# Patient Record
Sex: MALE | Race: WHITE | Employment: FULL TIME | URBAN - METROPOLITAN AREA
[De-identification: names, ages, dates, MRNs, and addresses within clinical notes are randomized per-mention and may not be internally consistent; named-entity substitution may affect disease eponyms.]

---

## 2017-07-17 ENCOUNTER — OFFICE VISIT (OUTPATIENT)
Dept: FAMILY MEDICINE | Facility: CLINIC | Age: 42
End: 2017-07-17
Payer: COMMERCIAL

## 2017-07-17 VITALS
BODY MASS INDEX: 26.81 KG/M2 | SYSTOLIC BLOOD PRESSURE: 130 MMHG | WEIGHT: 187.25 LBS | HEART RATE: 74 BPM | OXYGEN SATURATION: 99 % | HEIGHT: 70 IN | TEMPERATURE: 98 F | DIASTOLIC BLOOD PRESSURE: 84 MMHG

## 2017-07-17 DIAGNOSIS — R79.89 ELEVATED LFTS: Primary | ICD-10-CM

## 2017-07-17 DIAGNOSIS — K76.0 HEPATIC STEATOSIS: ICD-10-CM

## 2017-07-17 PROCEDURE — 99214 OFFICE O/P EST MOD 30 MIN: CPT | Performed by: FAMILY MEDICINE

## 2017-07-17 PROCEDURE — 36415 COLL VENOUS BLD VENIPUNCTURE: CPT | Performed by: FAMILY MEDICINE

## 2017-07-17 PROCEDURE — 80076 HEPATIC FUNCTION PANEL: CPT | Performed by: FAMILY MEDICINE

## 2017-07-17 NOTE — NURSING NOTE
"Chief Complaint   Patient presents with     Liver       Initial /84 (Cuff Size: Adult Regular)  Pulse 74  Temp 98  F (36.7  C) (Oral)  Ht 5' 9.5\" (1.765 m)  Wt 187 lb 4 oz (84.9 kg)  SpO2 99%  BMI 27.26 kg/m2 Estimated body mass index is 27.26 kg/(m^2) as calculated from the following:    Height as of this encounter: 5' 9.5\" (1.765 m).    Weight as of this encounter: 187 lb 4 oz (84.9 kg).  Medication Reconciliation: complete     Danyelle Crisostomo, CMA      "

## 2017-07-17 NOTE — PROGRESS NOTES
SUBJECTIVE:                                                    Jake Brothers is a 42 year old male who presents to clinic today for the following health issues:      Pt would like to discuss liver testing and would like labs for liver done and discuss seeing liver specialist       Problem list and histories reviewed & adjusted, as indicated.  Additional history: as documented    Labs reviewed in EPIC    Reviewed and updated as needed this visit by clinical staff  Tobacco  Allergies  Meds  Med Hx  Surg Hx  Fam Hx  Soc Hx      Reviewed and updated as needed this visit by Provider    Used to drink alcohol more. For last 1 yr - cut back. For last 5 months - quit totally.   Had elevated liver enzymes for a while.   Stool is lately more pale and some itching.      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 No     Caffeine Concern Not Asked     2 cups of coffee in am     Exercise Not Asked     Rides his bike everywhere 3 to 4 times a week     Social History Narrative    ** Merged History Encounter **         Balanced Diet - Yes    Osteoporosis Preventative measures-  Pt does not eat diary    Regular Exercise -  Yes Describe bike every where he goes    Dental Exam up - NO    Eye Exam - YES - Date: yearly    Self Testicular Exam -  No    Do you have any concerns about STD's -  No    Abuse: Current or Past (Physical, Sexual or Emotional)- No    Do you feel safe in your environment - Yes    Guns stored in the  "home - No    Sunscreen used - No    Seatbelts used - Yes    Lipids - NO    Glucose -  NO    Colon Cancer Screening - No    Hemoccults - NO    PSA - NO    Digital Rectal Exam - NO    Immunizations reviewed and up to date - Yes.Meagan Soliz MA    Pt had a tetanus shot in the past three years. But he is not sure.             No Known Allergies  Patient Active Problem List   Diagnosis     Obesity     CARDIOVASCULAR SCREENING; LDL GOAL LESS THAN 160     Eczema     Elevated LFTs     Cervicalgia     Hepatic steatosis     Reviewed medications, social history and  past medical and surgical history.    Review of system: for general, respiratory, CVS, GI and psychiatry negative except for noted above.     EXAM:  /84 (Cuff Size: Adult Regular)  Pulse 74  Temp 98  F (36.7  C) (Oral)  Ht 5' 9.5\" (1.765 m)  Wt 187 lb 4 oz (84.9 kg)  SpO2 99%  BMI 27.26 kg/m2  Constitutional: healthy, alert and no distress   Psychiatric: mentation appears normal and affect normal/bright  Abdomen: Abdomen soft, non-tender. BS normal. No masses, organomegaly  : Deferred      ASSESSMENT / PLAN:  (R79.89) Elevated LFTs  (primary encounter diagnosis)  Comment: chronic. Previous hepatitis workup negative. Liver US showed hepatic steatosis. Repeat LFTs. If abnormal - consider seeing GI. Stopped alcohol completely. Continue to avoid alcohol. Red flag symptoms and when to follow up symptoms discussed.   Plan: GASTROENTEROLOGY ADULT REF CONSULT ONLY,         **Hepatic panel FUTURE 2mo             (K76.0) Hepatic steatosis  Comment:  See above. On old US. Reviewed US and labs.   Plan: GASTROENTEROLOGY ADULT REF CONSULT ONLY,         **Hepatic panel FUTURE 2mo                  "

## 2017-07-17 NOTE — MR AVS SNAPSHOT
After Visit Summary   7/17/2017    Jake Brothers    MRN: [id number]           Patient Information     Date Of Birth          1975        Visit Information        Provider Department      7/17/2017 10:40 AM Caden Mitchell MD [facility]        Today's Diagnoses     Elevated LFTs    -  1    Hepatic steatosis           Follow-ups after your visit        Additional Services     GASTROENTEROLOGY ADULT REF CONSULT ONLY       Preferred Location: [facility], [facility] [phone]   OR  [place] [facility] [phone]      Please be aware that coverage of these services is subject to the terms and limitations of your health insurance plan.  Call member services at your health plan with any benefit or coverage questions.  Any procedures must be performed at a [place] facility OR coordinated by your clinic's referral office.    Please bring the following with you to your appointment:    (1) Any X-Rays, CTs or MRIs which have been performed.  Contact the facility where they were done to arrange for  prior to your scheduled appointment.    (2) List of current medications   (3) This referral request   (4) Any documents/labs given to you for this referral                  Who to contact     If you have questions or need follow up information about today's clinic visit or your schedule please contact [facility] directly at [phone].  Normal or non-critical lab and imaging results will be communicated to you by MyChart, letter or phone within 4 business days after the clinic has received the results. If you do not hear from us within 7 days, please contact the clinic through MyChart or phone. If you have a critical or abnormal lab result, we will notify you by phone as soon as possible.  Submit refill requests through [employer] or call your pharmacy and they will forward the refill request to us. Please allow 3 business days for your refill to be completed.          Additional  "Information About Your Visit        MyChart Information     [employer] gives you secure access to your electronic health record. If you see a primary care provider, you can also send messages to your care team and make appointments. If you have questions, please call your primary care clinic.  If you do not have a primary care provider, please call [phone] and they will assist you.        Care EveryWhere ID     This is your Care EveryWhere ID. This could be used by other organizations to access your [place] medical records  [id number]        Your Vitals Were     Pulse Temperature Height Pulse Oximetry BMI (Body Mass Index)       74 98  F (36.7  C) (Oral) 5' 9.5\" (1.765 m) 99% 27.26 kg/m2        Blood Pressure from Last 3 Encounters:   07/17/17 130/84   08/16/16 117/70   06/28/16 126/70    Weight from Last 3 Encounters:   07/17/17 187 lb 4 oz (84.9 kg)   08/16/16 192 lb (87.1 kg)   06/28/16 196 lb 8 oz (89.1 kg)              We Performed the Following     **Hepatic panel FUTURE 2mo     GASTROENTEROLOGY ADULT REF CONSULT ONLY        Primary Care Provider Office Phone # Fax #    Caden Ilya Mitchell [phone]       [place] [address]        Equal Access to Services     MARZENA BROCK : Hadii aad ku hadasho Soparish, waaxda luqadaha, qaybta kaalmada jimmie, christen rodriguez. So [facility] [phone].    ATENCIÓN: Si habla español, tiene a bender disposición servicios gratuitos de asistencia lingüística. Llame al [phone].    We comply with applicable federal civil rights laws and [place] laws. We do not discriminate on the basis of race, color, national origin, age, disability sex, sexual orientation or gender identity.            Thank you!     Thank you for choosing [facility]  for your care. Our goal is always to provide you with excellent care. Hearing back from our patients is one way we can continue to " improve our services. Please take a few minutes to complete the written survey that you may receive in the mail after your visit with us. Thank you!             Your Updated Medication List - Protect others around you: Learn how to safely use, store and throw away your medicines at www.disposemymeds.org.          This list is accurate as of: 7/17/17 10:56 AM.  Always use your most recent med list.                   Brand Name Dispense Instructions for use Diagnosis    albuterol 108 (90 BASE) MCG/ACT Inhaler    PROAIR HFA/PROVENTIL HFA/VENTOLIN HFA    1 Inhaler    Inhale 2 puffs into the lungs every 6 hours as needed for shortness of breath / dyspnea or wheezing    Cough

## 2017-07-18 LAB
ALBUMIN SERPL-MCNC: 3.5 G/DL (ref 3.4–5)
ALP SERPL-CCNC: 244 U/L (ref 40–150)
ALT SERPL W P-5'-P-CCNC: 147 U/L (ref 0–70)
AST SERPL W P-5'-P-CCNC: 125 U/L (ref 0–45)
BILIRUB DIRECT SERPL-MCNC: <0.1 MG/DL (ref 0–0.2)
BILIRUB SERPL-MCNC: 0.4 MG/DL (ref 0.2–1.3)
PROT SERPL-MCNC: 7.1 G/DL (ref 6.8–8.8)

## 2017-07-24 DIAGNOSIS — K76.0 HEPATIC STEATOSIS: ICD-10-CM

## 2017-07-24 DIAGNOSIS — R79.89 ELEVATED LFTS: ICD-10-CM

## 2017-07-24 LAB
ALBUMIN SERPL-MCNC: 3.5 G/DL (ref 3.4–5)
ALP SERPL-CCNC: 243 U/L (ref 40–150)
ALT SERPL W P-5'-P-CCNC: 122 U/L (ref 0–70)
ANION GAP SERPL CALCULATED.3IONS-SCNC: 4 MMOL/L (ref 3–14)
AST SERPL W P-5'-P-CCNC: 73 U/L (ref 0–45)
BASOPHILS # BLD AUTO: 0 10E9/L (ref 0–0.2)
BASOPHILS NFR BLD AUTO: 0.6 %
BILIRUB SERPL-MCNC: 0.3 MG/DL (ref 0.2–1.3)
BUN SERPL-MCNC: 9 MG/DL (ref 7–30)
CALCIUM SERPL-MCNC: 8.8 MG/DL (ref 8.5–10.1)
CHLORIDE SERPL-SCNC: 106 MMOL/L (ref 94–109)
CO2 SERPL-SCNC: 27 MMOL/L (ref 20–32)
CREAT SERPL-MCNC: 0.75 MG/DL (ref 0.66–1.25)
DEPRECATED CALCIDIOL+CALCIFEROL SERPL-MC: 27 UG/L (ref 20–75)
DIFFERENTIAL METHOD BLD: ABNORMAL
EOSINOPHIL # BLD AUTO: 0.1 10E9/L (ref 0–0.7)
EOSINOPHIL NFR BLD AUTO: 1 %
ERYTHROCYTE [DISTWIDTH] IN BLOOD BY AUTOMATED COUNT: 22.1 % (ref 10–15)
FERRITIN SERPL-MCNC: 4 NG/ML (ref 26–388)
GFR SERPL CREATININE-BSD FRML MDRD: ABNORMAL ML/MIN/1.7M2
GLUCOSE SERPL-MCNC: 85 MG/DL (ref 70–99)
HCT VFR BLD AUTO: 35.1 % (ref 40–53)
HGB BLD-MCNC: 10.8 G/DL (ref 13.3–17.7)
IRON SATN MFR SERPL: 5 % (ref 15–46)
IRON SERPL-MCNC: 22 UG/DL (ref 35–180)
LYMPHOCYTES # BLD AUTO: 0.9 10E9/L (ref 0.8–5.3)
LYMPHOCYTES NFR BLD AUTO: 18.3 %
MCH RBC QN AUTO: 20.4 PG (ref 26.5–33)
MCHC RBC AUTO-ENTMCNC: 30.8 G/DL (ref 31.5–36.5)
MCV RBC AUTO: 66 FL (ref 78–100)
MONOCYTES # BLD AUTO: 0.5 10E9/L (ref 0–1.3)
MONOCYTES NFR BLD AUTO: 8.9 %
NEUTROPHILS # BLD AUTO: 3.6 10E9/L (ref 1.6–8.3)
NEUTROPHILS NFR BLD AUTO: 71.2 %
PLATELET # BLD AUTO: 324 10E9/L (ref 150–450)
POTASSIUM SERPL-SCNC: 5.5 MMOL/L (ref 3.4–5.3)
PROT SERPL-MCNC: 7.4 G/DL (ref 6.8–8.8)
PTH-INTACT SERPL-MCNC: 106 PG/ML (ref 12–72)
RBC # BLD AUTO: 5.3 10E12/L (ref 4.4–5.9)
SODIUM SERPL-SCNC: 137 MMOL/L (ref 133–144)
TIBC SERPL-MCNC: 424 UG/DL (ref 240–430)
TRANSFERRIN SERPL-MCNC: 346 MG/DL (ref 210–360)
WBC # BLD AUTO: 5 10E9/L (ref 4–11)

## 2017-07-24 PROCEDURE — 82306 VITAMIN D 25 HYDROXY: CPT | Performed by: PHYSICIAN ASSISTANT

## 2017-07-24 PROCEDURE — 83550 IRON BINDING TEST: CPT | Performed by: PHYSICIAN ASSISTANT

## 2017-07-24 PROCEDURE — 85025 COMPLETE CBC W/AUTO DIFF WBC: CPT | Performed by: PHYSICIAN ASSISTANT

## 2017-07-24 PROCEDURE — 83516 IMMUNOASSAY NONANTIBODY: CPT | Mod: 59 | Performed by: PHYSICIAN ASSISTANT

## 2017-07-24 PROCEDURE — 83516 IMMUNOASSAY NONANTIBODY: CPT | Performed by: PHYSICIAN ASSISTANT

## 2017-07-24 PROCEDURE — 84466 ASSAY OF TRANSFERRIN: CPT | Performed by: PHYSICIAN ASSISTANT

## 2017-07-24 PROCEDURE — 83540 ASSAY OF IRON: CPT | Performed by: PHYSICIAN ASSISTANT

## 2017-07-24 PROCEDURE — 80053 COMPREHEN METABOLIC PANEL: CPT | Performed by: PHYSICIAN ASSISTANT

## 2017-07-24 PROCEDURE — 82728 ASSAY OF FERRITIN: CPT | Performed by: PHYSICIAN ASSISTANT

## 2017-07-24 PROCEDURE — 36415 COLL VENOUS BLD VENIPUNCTURE: CPT | Performed by: PHYSICIAN ASSISTANT

## 2017-07-24 PROCEDURE — 83970 ASSAY OF PARATHORMONE: CPT | Performed by: PHYSICIAN ASSISTANT

## 2017-07-25 LAB
TTG IGA SER-ACNC: ABNORMAL U/ML
TTG IGG SER-ACNC: 248 U/ML

## 2017-08-01 ENCOUNTER — OFFICE VISIT (OUTPATIENT)
Dept: FAMILY MEDICINE | Facility: CLINIC | Age: 42
End: 2017-08-01
Payer: COMMERCIAL

## 2017-08-01 VITALS
DIASTOLIC BLOOD PRESSURE: 78 MMHG | BODY MASS INDEX: 26.84 KG/M2 | OXYGEN SATURATION: 99 % | SYSTOLIC BLOOD PRESSURE: 128 MMHG | WEIGHT: 187.5 LBS | HEIGHT: 70 IN | TEMPERATURE: 99 F | HEART RATE: 78 BPM

## 2017-08-01 DIAGNOSIS — R79.89 ELEVATED LFTS: ICD-10-CM

## 2017-08-01 DIAGNOSIS — E87.5 HYPERKALEMIA: ICD-10-CM

## 2017-08-01 DIAGNOSIS — K76.0 HEPATIC STEATOSIS: ICD-10-CM

## 2017-08-01 DIAGNOSIS — R89.4 ABNORMAL CELIAC ANTIBODY PANEL: Primary | ICD-10-CM

## 2017-08-01 DIAGNOSIS — D50.9 IRON DEFICIENCY ANEMIA, UNSPECIFIED IRON DEFICIENCY ANEMIA TYPE: ICD-10-CM

## 2017-08-01 PROCEDURE — 80053 COMPREHEN METABOLIC PANEL: CPT | Performed by: FAMILY MEDICINE

## 2017-08-01 PROCEDURE — 36415 COLL VENOUS BLD VENIPUNCTURE: CPT | Performed by: FAMILY MEDICINE

## 2017-08-01 PROCEDURE — 99214 OFFICE O/P EST MOD 30 MIN: CPT | Performed by: FAMILY MEDICINE

## 2017-08-01 NOTE — NURSING NOTE
"Chief Complaint   Patient presents with     Results     lab        Initial /78 (Cuff Size: Adult Regular)  Pulse 78  Temp 99  F (37.2  C) (Oral)  Ht 5' 9.5\" (1.765 m)  Wt 187 lb 8 oz (85 kg)  SpO2 99%  BMI 27.29 kg/m2 Estimated body mass index is 27.29 kg/(m^2) as calculated from the following:    Height as of this encounter: 5' 9.5\" (1.765 m).    Weight as of this encounter: 187 lb 8 oz (85 kg).  Medication Reconciliation: complete     Danyelle Crisostomo, SELENA      "

## 2017-08-01 NOTE — MR AVS SNAPSHOT
After Visit Summary   8/1/2017    Jake Brothers    MRN: [id number]           Patient Information     Date Of Birth          1975        Visit Information        Provider Department      8/1/2017 11:00 AM Caden Mitchell MD [facility]        Today's Diagnoses     Abnormal celiac antibody panel    -  1    Iron deficiency anemia, unspecified iron deficiency anemia type        Elevated LFTs        Hepatic steatosis        Hyperkalemia           Follow-ups after your visit        Your next 10 appointments already scheduled     Oct 02, 2017  2:00 PM CDT   Lab with  LAB   [facility] ([facility])    [address]  1st Floor  [place] [address] [phone]            Oct 02, 2017  3:00 PM CDT   (Arrive by 2:45 PM)   New General Liver with Michael Becerra MD   [facility] ([facility])    [address]  3rd Floor  [place] [address] [phone]              Who to contact     If you have questions or need follow up information about today's clinic visit or your schedule please contact [facility] directly at [phone].  Normal or non-critical lab and imaging results will be communicated to you by MyChart, letter or phone within 4 business days after the clinic has received the results. If you do not hear from us within 7 days, please contact the clinic through MyChart or phone. If you have a critical or abnormal lab result, we will notify you by phone as soon as possible.  Submit refill requests through [employer] or call your pharmacy and they will forward the refill request to us. Please allow 3 business days for your refill to be completed.          Additional Information About Your Visit        MyChart Information     [employer] gives you secure access to your electronic health record. If you see a primary care provider, you can also send messages to your care team and make  "appointments. If you have questions, please call your primary care clinic.  If you do not have a primary care provider, please call [phone] and they will assist you.        Care EveryWhere ID     This is your Care EveryWhere ID. This could be used by other organizations to access your [place] medical records  [id number]        Your Vitals Were     Pulse Temperature Height Pulse Oximetry BMI (Body Mass Index)       78 99  F (37.2  C) (Oral) 5' 9.5\" (1.765 m) 99% 27.29 kg/m2        Blood Pressure from Last 3 Encounters:   08/01/17 128/78   07/17/17 130/84   08/16/16 117/70    Weight from Last 3 Encounters:   08/01/17 187 lb 8 oz (85 kg)   07/17/17 187 lb 4 oz (84.9 kg)   08/16/16 192 lb (87.1 kg)              We Performed the Following     Comprehensive metabolic panel        Primary Care Provider Office Phone # Fax #    Caden Ilya Mitchell [phone]       [place] [address]        Equal Access to Services     [facility] : Hadii anjelica ku hadasho Soprateekali, waaxda luqadaha, qaybta kaalmada adeceyada, christen collins . So [facility] [phone].    ATENCIÓN: Si habla español, tiene a bender disposición servicios gratuitos de asistencia lingüística. [facility] [phone].    We comply with applicable federal civil rights laws and [place] laws. We do not discriminate on the basis of race, color, national origin, age, disability sex, sexual orientation or gender identity.            Thank you!     Thank you for choosing [facility]  for your care. Our goal is always to provide you with excellent care. Hearing back from our patients is one way we can continue to improve our services. Please take a few minutes to complete the written survey that you may receive in the mail after your visit with us. Thank you!             Your Updated Medication List - Protect others around you: Learn how to safely use, store and throw " away your medicines at www.disposemymeds.org.          This list is accurate as of: 8/1/17 11:59 PM.  Always use your most recent med list.                   Brand Name Dispense Instructions for use Diagnosis    albuterol 108 (90 BASE) MCG/ACT Inhaler    PROAIR HFA/PROVENTIL HFA/VENTOLIN HFA    1 Inhaler    Inhale 2 puffs into the lungs every 6 hours as needed for shortness of breath / dyspnea or wheezing    Cough

## 2017-08-01 NOTE — PROGRESS NOTES
SUBJECTIVE:                                                    Jake Brothers is a 42 year old male who presents to clinic today for the following health issues:     Patient would like to go over labs.     Concerned about abnormal labs.     Seeing gastroenterologist - this month. Aug 20th.     Last week - stooped  - stool improved, skin improved -  Would like to stay off gluten if possible.     No supplements.     Problem list and histories reviewed & adjusted, as indicated.  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 No     Caffeine Concern Not Asked     2 cups of coffee in am     Exercise Not Asked     Rides his bike everywhere 3 to 4 times a week     Social History Narrative    ** Merged History Encounter **         Balanced Diet - Yes    Osteoporosis Preventative measures-  Pt does not eat diary    Regular Exercise -  Yes Describe bike every where he goes    Dental Exam up - NO    Eye Exam - YES - Date: yearly    Self Testicular Exam -  No    Do you have any concerns about STD's -  No    Abuse: Current or Past (Physical, Sexual or Emotional)- No    Do you feel safe in your environment - Yes    Guns stored in the home - No    Sunscreen used - No    Seatbelts used - Yes    Lipids - NO    Glucose -  NO    Colon Cancer  "Screening - No    Hemoccults - NO    PSA - NO    Digital Rectal Exam - NO    Immunizations reviewed and up to date - Yes.Meagan Soliz MA    Pt had a tetanus shot in the past three years. But he is not sure.             No Known Allergies  Patient Active Problem List   Diagnosis     Obesity     CARDIOVASCULAR SCREENING; LDL GOAL LESS THAN 160     Eczema     Elevated LFTs     Cervicalgia     Hepatic steatosis     Reviewed medications, social history and  past medical and surgical history.    Review of system: for general, respiratory, CVS, GI and psychiatry negative except for noted above.     EXAM:  /78 (Cuff Size: Adult Regular)  Pulse 78  Temp 99  F (37.2  C) (Oral)  Ht 5' 9.5\" (1.765 m)  Wt 187 lb 8 oz (85 kg)  SpO2 99%  BMI 27.29 kg/m2  Constitutional: healthy, alert and no distress   Psychiatric: mentation appears normal and affect normal/bright  Cardiovascular: RRR. No murmurs,  Respiratory: negative, Lungs clear. No crackles or wheezing. No tachypnea.    skin - multiple hypopigmented skin lesions on various parts of body.     ASSESSMENT / PLAN:   (R89.4) Abnormal celiac antibody panel  (primary encounter diagnosis)  Comment: abnormal panel. His iron deficiency anemia, elevated LFTs, hepatic symptoms all are suggestive of celiac disease. Discussed biopsy is generally needed for confirmation. Seeing GI in 3 weeks. He already stopped gluten products and I am not sure if it is affects his biopsy result. We agreed to hold off on gluten for now as he is seeing significant improvement in his skin itching (?dermatitis herpatiform)   Plan:      (D50.9) Iron deficiency anemia, unspecified iron deficiency anemia type  Comment: start with OTC iron supplement twice per day for now. May improve with stopping gluten if it is related to celiac disease.   Plan:      (R79.89) Elevated LFTs  Comment:  Recheck.  Plan: Comprehensive metabolic panel             (K76.0) Hepatic steatosis  Comment:     Plan: recheck " labs today.     (E87.5) Hyperkalemia  Comment:  Asymptomatic.   Plan: Comprehensive metabolic panel

## 2017-08-02 LAB
ALBUMIN SERPL-MCNC: 3.8 G/DL (ref 3.4–5)
ALP SERPL-CCNC: 239 U/L (ref 40–150)
ALT SERPL W P-5'-P-CCNC: 164 U/L (ref 0–70)
ANION GAP SERPL CALCULATED.3IONS-SCNC: 6 MMOL/L (ref 3–14)
AST SERPL W P-5'-P-CCNC: 98 U/L (ref 0–45)
BILIRUB SERPL-MCNC: 0.3 MG/DL (ref 0.2–1.3)
BUN SERPL-MCNC: 10 MG/DL (ref 7–30)
CALCIUM SERPL-MCNC: 8.9 MG/DL (ref 8.5–10.1)
CHLORIDE SERPL-SCNC: 105 MMOL/L (ref 94–109)
CO2 SERPL-SCNC: 29 MMOL/L (ref 20–32)
CREAT SERPL-MCNC: 0.79 MG/DL (ref 0.66–1.25)
GFR SERPL CREATININE-BSD FRML MDRD: ABNORMAL ML/MIN/1.7M2
GLUCOSE SERPL-MCNC: 113 MG/DL (ref 70–99)
POTASSIUM SERPL-SCNC: 4.8 MMOL/L (ref 3.4–5.3)
PROT SERPL-MCNC: 7.5 G/DL (ref 6.8–8.8)
SODIUM SERPL-SCNC: 140 MMOL/L (ref 133–144)

## 2017-08-24 DIAGNOSIS — R79.89 ELEVATED LFTS: Primary | ICD-10-CM

## 2017-09-21 ENCOUNTER — TELEPHONE (OUTPATIENT)
Dept: GASTROENTEROLOGY | Facility: CLINIC | Age: 42
End: 2017-09-21

## 2017-09-21 NOTE — TELEPHONE ENCOUNTER
Patient contacted and reminded of upcoming appointment.  Patient confirmed they will be attending.  Patient instructed to bring updated medications list to appointment.  Instructed pt to arrive an hour and a half to two hours prior to appt time for labs.  Mynor Pizarro, CMA

## 2017-10-02 ENCOUNTER — OFFICE VISIT (OUTPATIENT)
Dept: GASTROENTEROLOGY | Facility: CLINIC | Age: 42
End: 2017-10-02
Attending: INTERNAL MEDICINE

## 2017-10-02 VITALS
HEART RATE: 65 BPM | HEIGHT: 70 IN | SYSTOLIC BLOOD PRESSURE: 146 MMHG | WEIGHT: 199.5 LBS | TEMPERATURE: 98.4 F | DIASTOLIC BLOOD PRESSURE: 75 MMHG | BODY MASS INDEX: 28.56 KG/M2

## 2017-10-02 DIAGNOSIS — R79.89 ELEVATED LFTS: ICD-10-CM

## 2017-10-02 DIAGNOSIS — K76.0 HEPATIC STEATOSIS: ICD-10-CM

## 2017-10-02 PROBLEM — L80 VITILIGO: Status: ACTIVE | Noted: 2017-10-02

## 2017-10-02 PROBLEM — K90.0 CELIAC DISEASE: Status: ACTIVE | Noted: 2017-10-02

## 2017-10-02 PROBLEM — E61.1 IRON DEFICIENCY: Status: ACTIVE | Noted: 2017-10-02

## 2017-10-02 LAB
ALBUMIN SERPL-MCNC: 3.8 G/DL (ref 3.4–5)
ALP SERPL-CCNC: 152 U/L (ref 40–150)
ALT SERPL W P-5'-P-CCNC: 65 U/L (ref 0–70)
ANION GAP SERPL CALCULATED.3IONS-SCNC: 6 MMOL/L (ref 3–14)
AST SERPL W P-5'-P-CCNC: 34 U/L (ref 0–45)
BILIRUB DIRECT SERPL-MCNC: <0.1 MG/DL (ref 0–0.2)
BILIRUB SERPL-MCNC: 0.3 MG/DL (ref 0.2–1.3)
BUN SERPL-MCNC: 7 MG/DL (ref 7–30)
CALCIUM SERPL-MCNC: 9 MG/DL (ref 8.5–10.1)
CHLORIDE SERPL-SCNC: 98 MMOL/L (ref 94–109)
CO2 SERPL-SCNC: 28 MMOL/L (ref 20–32)
CREAT SERPL-MCNC: 0.66 MG/DL (ref 0.66–1.25)
ERYTHROCYTE [DISTWIDTH] IN BLOOD BY AUTOMATED COUNT: 18.6 % (ref 10–15)
GFR SERPL CREATININE-BSD FRML MDRD: >90 ML/MIN/1.7M2
GLUCOSE SERPL-MCNC: 227 MG/DL (ref 70–99)
HCT VFR BLD AUTO: 39.6 % (ref 40–53)
HGB BLD-MCNC: 11.7 G/DL (ref 13.3–17.7)
INR PPP: 1.04 (ref 0.86–1.14)
MCH RBC QN AUTO: 21.2 PG (ref 26.5–33)
MCHC RBC AUTO-ENTMCNC: 29.5 G/DL (ref 31.5–36.5)
MCV RBC AUTO: 72 FL (ref 78–100)
PLATELET # BLD AUTO: 299 10E9/L (ref 150–450)
POTASSIUM SERPL-SCNC: 4.5 MMOL/L (ref 3.4–5.3)
PROT SERPL-MCNC: 7.6 G/DL (ref 6.8–8.8)
RBC # BLD AUTO: 5.52 10E12/L (ref 4.4–5.9)
SODIUM SERPL-SCNC: 132 MMOL/L (ref 133–144)
WBC # BLD AUTO: 5.7 10E9/L (ref 4–11)

## 2017-10-02 PROCEDURE — 85027 COMPLETE CBC AUTOMATED: CPT | Performed by: INTERNAL MEDICINE

## 2017-10-02 PROCEDURE — 36415 COLL VENOUS BLD VENIPUNCTURE: CPT | Performed by: INTERNAL MEDICINE

## 2017-10-02 PROCEDURE — 80076 HEPATIC FUNCTION PANEL: CPT | Performed by: INTERNAL MEDICINE

## 2017-10-02 PROCEDURE — 85610 PROTHROMBIN TIME: CPT | Performed by: INTERNAL MEDICINE

## 2017-10-02 PROCEDURE — 99212 OFFICE O/P EST SF 10 MIN: CPT | Mod: ZF

## 2017-10-02 PROCEDURE — 80048 BASIC METABOLIC PNL TOTAL CA: CPT | Performed by: INTERNAL MEDICINE

## 2017-10-02 ASSESSMENT — PAIN SCALES - GENERAL: PAINLEVEL: NO PAIN (0)

## 2017-10-02 NOTE — PROGRESS NOTES
I had the pleasure of seeing Jake Brothers for consultation in the Liver Clinic at the [facility] on 10/02/2017.  Mr. Brothers presents for consultation regarding abnormal liver tests, likely secondary to celiac disease.      In going back through the records, it appears as though he has had iron deficiency anemia dating back to 2016.  He also had some abnormal bowel habits and eventually had celiac testing that showed that he had very high TTG antibodies, both IgA and IgG.  He never had a small bowel biopsy, but he was told that he should go on a gluten-free diet.  He has not seen a dietitian, but after reading up in books and what not, he has put himself on a gluten-free diet that at least seems to have partially led to an improvement in things.      He denies any abdominal pain.  He does complain of some itching in the absence of skin rash.  He denies any fevers or chills, cough or shortness of breath.  He denies any nausea or vomiting, diarrhea or constipation.  He does note based on his bowel habits whether he is being compliant with a gluten-free diet or not.  He has taken all bread products out of his diet, and he does not use any processed flour.  He is aware that there can often be small amounts in certain foods as well.      He does not use any alcohol.  He was tested for viral hepatitis and was negative.  As I mentioned, he is iron deficient, so this is not hemochromatosis.      The only other thing of interest is that he does have some vitiligo.      Past Medical History:  He has not had any previous surgery.  His only other medical problems are the iron deficiency and vitiligo.      Social History:  He does not drink any alcohol and does not use any tobacco.  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      Family History:  Negative for liver disease, type 1 diabetes or celiac disease.  He believes his mother has some sort of  arthritis but is not sure what exactly it is related to.      Complete review of systems is negative other than that noted above.     Current Outpatient Prescriptions   Medication     Ferrous Sulfate (IRON SUPPLEMENT PO)     albuterol (PROAIR HFA, PROVENTIL HFA, VENTOLIN HFA) 108 (90 BASE) MCG/ACT inhaler     No current facility-administered medications for this visit.      B/P: 146/75, T: 98.4, P: 65, R: Data Unavailable    In general, he appears quite healthy.  HEENT exam shows no scleral icterus and no temporal muscle wasting.  His neck is without thyromegaly.  His chest is clear.  Cardiac exam reveals no S3, S4 or murmur.  His abdominal exam shows no increase in girth.  No masses or tenderness to palpation are present.  His liver is 9-10 cm in span, not palpable at the right costal margin.  No spleen tip is palpable, and extremity exam shows no edema.  Skin exam does show vitiligo on his arms, but no stigmata of chronic liver disease.  Neurologic exam shows no asterixis.     Recent Results (from the past 168 hour(s))   Hepatic panel    Collection Time: 10/02/17  2:03 PM   Result Value Ref Range    Bilirubin Direct <0.1 0.0 - 0.2 mg/dL    Bilirubin Total 0.3 0.2 - 1.3 mg/dL    Albumin 3.8 3.4 - 5.0 g/dL    Protein Total 7.6 6.8 - 8.8 g/dL    Alkaline Phosphatase 152 (H) 40 - 150 U/L    ALT 65 0 - 70 U/L    AST 34 0 - 45 U/L   CBC with platelets    Collection Time: 10/02/17  2:03 PM   Result Value Ref Range    WBC 5.7 4.0 - 11.0 10e9/L    RBC Count 5.52 4.4 - 5.9 10e12/L    Hemoglobin 11.7 (L) 13.3 - 17.7 g/dL    Hematocrit 39.6 (L) 40.0 - 53.0 %    MCV 72 (L) 78 - 100 fl    MCH 21.2 (L) 26.5 - 33.0 pg    MCHC 29.5 (L) 31.5 - 36.5 g/dL    RDW 18.6 (H) 10.0 - 15.0 %    Platelet Count 299 150 - 450 10e9/L   INR    Collection Time: 10/02/17  2:03 PM   Result Value Ref Range    INR 1.04 0.86 - 1.14   Basic metabolic panel    Collection Time: 10/02/17  2:03 PM   Result Value Ref Range    Sodium 132 (L) 133 - 144 mmol/L     Potassium 4.5 3.4 - 5.3 mmol/L    Chloride 98 94 - 109 mmol/L    Carbon Dioxide 28 20 - 32 mmol/L    Anion Gap 6 3 - 14 mmol/L    Glucose 227 (H) 70 - 99 mg/dL    Urea Nitrogen 7 7 - 30 mg/dL    Creatinine 0.66 0.66 - 1.25 mg/dL    GFR Estimate >90 >60 mL/min/1.7m2    GFR Estimate If Black >90 >60 mL/min/1.7m2    Calcium 9.0 8.5 - 10.1 mg/dL      My impression is that Mr. Brothers has abnormal liver tests which seem most likely due to celiac disease in that his liver tests have markedly improved since going on a gluten-free diet.  The other main diagnosis in the differential would be autoimmune hepatitis given that he has vitiligo.  There also is an association between autoimmune hepatitis and celiac disease.  He has not been tested for antibodies and I will go ahead and do that with his next blood draw.      In terms of his celiac disease, I will have him see a dietitian today to talk about all aspects of the gluten-free diet.  He also will return in 3 months to see me to make sure that his liver tests are improving and to get the auto antibodies tested.  I also will schedule him for a small bowel biopsy at that time because that is the best way to determine whether he is in remission.      Thank you very much for allowing me to participate in the care of this patient.  If you have any questions regarding my recommendations, please do not hesitate to contact me.       Michael Becerra MD      Professor of Medicine  [employer]      Executive Medical Director, Solid Organ Transplant Program  [facility]

## 2017-10-02 NOTE — LETTER
10/2/2017       RE: Jake Brothers  [address] [place] [id number]     Dear Colleague,    Thank you for referring your patient, Jake Brothers, to the [facility] HEPATOLOGY at [facility] Please see a copy of my visit note below.    I had the pleasure of seeing Jake Brothers for consultation in the Liver Clinic at the [facility] on 10/02/2017.  Mr. Brothers presents for consultation regarding abnormal liver tests, likely secondary to celiac disease.      In going back through the records, it appears as though he has had iron deficiency anemia dating back to 2016.  He also had some abnormal bowel habits and eventually had celiac testing that showed that he had very high TTG antibodies, both IgA and IgG.  He never had a small bowel biopsy, but he was told that he should go on a gluten-free diet.  He has not seen a dietitian, but after reading up in books and what not, he has put himself on a gluten-free diet that at least seems to have partially led to an improvement in things.      He denies any abdominal pain.  He does complain of some itching in the absence of skin rash.  He denies any fevers or chills, cough or shortness of breath.  He denies any nausea or vomiting, diarrhea or constipation.  He does note based on his bowel habits whether he is being compliant with a gluten-free diet or not.  He has taken all bread products out of his diet, and he does not use any processed flour.  He is aware that there can often be small amounts in certain foods as well.      He does not use any alcohol.  He was tested for viral hepatitis and was negative.  As I mentioned, he is iron deficient, so this is not hemochromatosis.      The only other thing of interest is that he does have some vitiligo.      Past Medical History:  He has not had any previous surgery.  His only other medical problems are the iron deficiency and vitiligo.      Social  History:  He does not drink any alcohol and does not use any tobacco.  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      Family History:  Negative for liver disease, type 1 diabetes or celiac disease.  He believes his mother has some sort of arthritis but is not sure what exactly it is related to.      Complete review of systems is negative other than that noted above.     Current Outpatient Prescriptions   Medication     Ferrous Sulfate (IRON SUPPLEMENT PO)     albuterol (PROAIR HFA, PROVENTIL HFA, VENTOLIN HFA) 108 (90 BASE) MCG/ACT inhaler     No current facility-administered medications for this visit.      B/P: 146/75, T: 98.4, P: 65, R: Data Unavailable    In general, he appears quite healthy.  HEENT exam shows no scleral icterus and no temporal muscle wasting.  His neck is without thyromegaly.  His chest is clear.  Cardiac exam reveals no S3, S4 or murmur.  His abdominal exam shows no increase in girth.  No masses or tenderness to palpation are present.  His liver is 9-10 cm in span, not palpable at the right costal margin.  No spleen tip is palpable, and extremity exam shows no edema.  Skin exam does show vitiligo on his arms, but no stigmata of chronic liver disease.  Neurologic exam shows no asterixis.     Recent Results (from the past 168 hour(s))   Hepatic panel    Collection Time: 10/02/17  2:03 PM   Result Value Ref Range    Bilirubin Direct <0.1 0.0 - 0.2 mg/dL    Bilirubin Total 0.3 0.2 - 1.3 mg/dL    Albumin 3.8 3.4 - 5.0 g/dL    Protein Total 7.6 6.8 - 8.8 g/dL    Alkaline Phosphatase 152 (H) 40 - 150 U/L    ALT 65 0 - 70 U/L    AST 34 0 - 45 U/L   CBC with platelets    Collection Time: 10/02/17  2:03 PM   Result Value Ref Range    WBC 5.7 4.0 - 11.0 10e9/L    RBC Count 5.52 4.4 - 5.9 10e12/L    Hemoglobin 11.7 (L) 13.3 - 17.7 g/dL    Hematocrit 39.6 (L) 40.0 - 53.0 %    MCV 72 (L) 78 - 100 fl    MCH 21.2 (L) 26.5 - 33.0 pg    MCHC 29.5 (L) 31.5 -  36.5 g/dL    RDW 18.6 (H) 10.0 - 15.0 %    Platelet Count 299 150 - 450 10e9/L   INR    Collection Time: 10/02/17  2:03 PM   Result Value Ref Range    INR 1.04 0.86 - 1.14   Basic metabolic panel    Collection Time: 10/02/17  2:03 PM   Result Value Ref Range    Sodium 132 (L) 133 - 144 mmol/L    Potassium 4.5 3.4 - 5.3 mmol/L    Chloride 98 94 - 109 mmol/L    Carbon Dioxide 28 20 - 32 mmol/L    Anion Gap 6 3 - 14 mmol/L    Glucose 227 (H) 70 - 99 mg/dL    Urea Nitrogen 7 7 - 30 mg/dL    Creatinine 0.66 0.66 - 1.25 mg/dL    GFR Estimate >90 >60 mL/min/1.7m2    GFR Estimate If Black >90 >60 mL/min/1.7m2    Calcium 9.0 8.5 - 10.1 mg/dL      My impression is that Mr. Brothers has abnormal liver tests which seem most likely due to celiac disease in that his liver tests have markedly improved since going on a gluten-free diet.  The other main diagnosis in the differential would be autoimmune hepatitis given that he has vitiligo.  There also is an association between autoimmune hepatitis and celiac disease.  He has not been tested for antibodies and I will go ahead and do that with his next blood draw.      In terms of his celiac disease, I will have him see a dietitian today to talk about all aspects of the gluten-free diet.  He also will return in 3 months to see me to make sure that his liver tests are improving and to get the auto antibodies tested.  I also will schedule him for a small bowel biopsy at that time because that is the best way to determine whether he is in remission.      Thank you very much for allowing me to participate in the care of this patient.  If you have any questions regarding my recommendations, please do not hesitate to contact me.       Michael Becerra MD      Professor of Medicine  [employer]      Executive Medical Director, Solid Organ Transplant Program  [facility]

## 2017-10-02 NOTE — MR AVS SNAPSHOT
After Visit Summary   10/2/2017    Jake Brothers    MRN: [id number]           Patient Information     Date Of Birth          1975        Visit Information        Provider Department      10/2/2017 3:00 PM Michael Becerra MD [facility] Hepatology        Today's Diagnoses     Elevated LFTs        Hepatic steatosis           Follow-ups after your visit        Follow-up notes from your care team     Return in about 3 months (around 1/2/2018).      Your next 10 appointments already scheduled     Jan 09, 2018  7:45 AM CST   Lab with  LAB   [facility] ([facility])    [address] [place] [address]  1st [facility] [address] [phone]            Jan 09, 2018  8:45 AM CST   (Arrive by 8:30 AM)   Return General Liver with Michael Becerra MD   [facility] Hepatology ([facility])    [address] [place] [address]  3rd [facility] [address] [phone]              Who to contact     If you have questions or need follow up information about today's clinic visit or your schedule please contact [facility] HEPATOLOGY directly at [phone].  Normal or non-critical lab and imaging results will be communicated to you by MyChart, letter or phone within 4 business days after the clinic has received the results. If you do not hear from us within 7 days, please contact the clinic through [employer] or phone. If you have a critical or abnormal lab result, we will notify you by phone as soon as possible.  Submit refill requests through [employer] or call your pharmacy and they will forward the refill request to us. Please allow 3 business days for your refill to be completed.          Additional Information About Your Visit        MyChart Information     [employer] gives you secure access to your electronic health record. If you see a primary care provider, you can also send messages to your care team and make appointments. If you have questions, please call your primary care  "clinic.  If you do not have a primary care provider, please call [phone] and they will assist you.        Care EveryWhere ID     This is your Care EveryWhere ID. This could be used by other organizations to access your [facility] medical records  [id number]        Your Vitals Were     Pulse Temperature Height BMI (Body Mass Index)          65 98.4  F (36.9  C) (Oral) 1.778 m (5' 10\") 28.63 kg/m2         Blood Pressure from Last 3 Encounters:   10/02/17 146/75   08/01/17 128/78   07/17/17 130/84    Weight from Last 3 Encounters:   10/02/17 90.5 kg (199 lb 8 oz)   08/01/17 85 kg (187 lb 8 oz)   07/17/17 84.9 kg (187 lb 4 oz)              Today, you had the following     No orders found for display       Primary Care Provider Office Phone # Fax #    Caden Ilya Mitchell [phone] [address] [place], [place] [id number]        Equal Access to Services     [facility]: Hadii anjelica nolan Soparish, waaxda luqadaha, qaybta kaalmavlad samuel, christen collins . So [facility] [phone].    ATENCIÓN: Si habla español, tiene a bender disposición servicios gratuitos de asistencia lingüística. Llame al [phone].    We comply with applicable federal civil rights laws and [place] laws. We do not discriminate on the basis of race, color, national origin, age, disability, sex, sexual orientation, or gender identity.            Thank you!     Thank you for choosing [facility] HEPATOLOGY  for your care. Our goal is always to provide you with excellent care. Hearing back from our patients is one way we can continue to improve our services. Please take a few minutes to complete the written survey that you may receive in the mail after your visit with us. Thank you!             Your Updated Medication List - Protect others around you: Learn how to safely use, store and throw away your medicines at www.disposemymeds.org.          This list is accurate as of: 10/2/17  3:16 PM.  " Always use your most recent med list.                   Brand Name Dispense Instructions for use Diagnosis    albuterol 108 (90 BASE) MCG/ACT Inhaler    PROAIR HFA/PROVENTIL HFA/VENTOLIN HFA    1 Inhaler    Inhale 2 puffs into the lungs every 6 hours as needed for shortness of breath / dyspnea or wheezing    Cough       IRON SUPPLEMENT PO      Take 2 tablets by mouth 2 times daily (with meals)

## 2017-10-02 NOTE — NURSING NOTE
"Chief Complaint   Patient presents with     New Patient     Elevated liver enzymes and hepatic steatosis       Initial /75  Pulse 65  Temp 98.4  F (36.9  C) (Oral)  Ht 1.778 m (5' 10\")  Wt 90.5 kg (199 lb 8 oz)  BMI 28.63 kg/m2 Estimated body mass index is 28.63 kg/(m^2) as calculated from the following:    Height as of this encounter: 1.778 m (5' 10\").    Weight as of this encounter: 90.5 kg (199 lb 8 oz).  Medication Reconciliation: complete  "

## 2017-11-21 DIAGNOSIS — K90.0 CELIAC DISEASE: Primary | ICD-10-CM

## 2018-01-04 ENCOUNTER — TELEPHONE (OUTPATIENT)
Dept: GASTROENTEROLOGY | Facility: CLINIC | Age: 43
End: 2018-01-04

## 2018-03-05 ENCOUNTER — OFFICE VISIT (OUTPATIENT)
Dept: FAMILY MEDICINE | Facility: CLINIC | Age: 43
End: 2018-03-05
Payer: COMMERCIAL

## 2018-03-05 VITALS
WEIGHT: 174 LBS | DIASTOLIC BLOOD PRESSURE: 85 MMHG | OXYGEN SATURATION: 98 % | RESPIRATION RATE: 14 BRPM | HEIGHT: 70 IN | BODY MASS INDEX: 24.91 KG/M2 | SYSTOLIC BLOOD PRESSURE: 121 MMHG | TEMPERATURE: 97.7 F | HEART RATE: 74 BPM

## 2018-03-05 DIAGNOSIS — R73.09 ELEVATED GLUCOSE: ICD-10-CM

## 2018-03-05 DIAGNOSIS — R35.0 URINARY FREQUENCY: ICD-10-CM

## 2018-03-05 LAB
ALBUMIN SERPL-MCNC: 3.9 G/DL (ref 3.4–5)
ALBUMIN UR-MCNC: NEGATIVE MG/DL
ALP SERPL-CCNC: 110 U/L (ref 40–150)
ALT SERPL W P-5'-P-CCNC: 37 U/L (ref 0–70)
ANION GAP SERPL CALCULATED.3IONS-SCNC: 9 MMOL/L (ref 3–14)
APPEARANCE UR: CLEAR
AST SERPL W P-5'-P-CCNC: 22 U/L (ref 0–45)
BILIRUB SERPL-MCNC: 0.3 MG/DL (ref 0.2–1.3)
BILIRUB UR QL STRIP: NEGATIVE
BUN SERPL-MCNC: 13 MG/DL (ref 7–30)
CALCIUM SERPL-MCNC: 9.4 MG/DL (ref 8.5–10.1)
CHLORIDE SERPL-SCNC: 95 MMOL/L (ref 94–109)
CHOLEST SERPL-MCNC: 202 MG/DL
CO2 SERPL-SCNC: 26 MMOL/L (ref 20–32)
COLOR UR AUTO: YELLOW
CREAT SERPL-MCNC: 0.62 MG/DL (ref 0.66–1.25)
CREAT UR-MCNC: 32 MG/DL
GFR SERPL CREATININE-BSD FRML MDRD: >90 ML/MIN/1.7M2
GLUCOSE SERPL-MCNC: 447 MG/DL (ref 70–99)
GLUCOSE UR STRIP-MCNC: 500 MG/DL
HBA1C MFR BLD: >15 % (ref 4.3–6)
HDLC SERPL-MCNC: 25 MG/DL
HGB UR QL STRIP: NEGATIVE
INSULIN SERPL-ACNC: 1.8 MU/L (ref 3–25)
KETONES UR STRIP-MCNC: 40 MG/DL
LDLC SERPL CALC-MCNC: 108 MG/DL
LEUKOCYTE ESTERASE UR QL STRIP: NEGATIVE
MICROALBUMIN UR-MCNC: 6 MG/L
MICROALBUMIN/CREAT UR: 19.72 MG/G CR (ref 0–17)
NITRATE UR QL: NEGATIVE
NONHDLC SERPL-MCNC: 177 MG/DL
PH UR STRIP: 5 PH (ref 5–7)
POTASSIUM SERPL-SCNC: 4.2 MMOL/L (ref 3.4–5.3)
PROT SERPL-MCNC: 7.3 G/DL (ref 6.8–8.8)
SODIUM SERPL-SCNC: 130 MMOL/L (ref 133–144)
SOURCE: ABNORMAL
SP GR UR STRIP: 1.01 (ref 1–1.03)
TRIGL SERPL-MCNC: 345 MG/DL
TSH SERPL DL<=0.005 MIU/L-ACNC: 1.19 MU/L (ref 0.4–4)
UROBILINOGEN UR STRIP-ACNC: 0.2 EU/DL (ref 0.2–1)

## 2018-03-05 PROCEDURE — 80061 LIPID PANEL: CPT | Performed by: FAMILY MEDICINE

## 2018-03-05 PROCEDURE — 86341 ISLET CELL ANTIBODY: CPT | Mod: 90 | Performed by: FAMILY MEDICINE

## 2018-03-05 PROCEDURE — 81003 URINALYSIS AUTO W/O SCOPE: CPT | Performed by: FAMILY MEDICINE

## 2018-03-05 PROCEDURE — 84443 ASSAY THYROID STIM HORMONE: CPT | Performed by: FAMILY MEDICINE

## 2018-03-05 PROCEDURE — 83036 HEMOGLOBIN GLYCOSYLATED A1C: CPT | Performed by: FAMILY MEDICINE

## 2018-03-05 PROCEDURE — 83525 ASSAY OF INSULIN: CPT | Performed by: FAMILY MEDICINE

## 2018-03-05 PROCEDURE — 83516 IMMUNOASSAY NONANTIBODY: CPT | Mod: 90 | Performed by: FAMILY MEDICINE

## 2018-03-05 PROCEDURE — 80053 COMPREHEN METABOLIC PANEL: CPT | Performed by: FAMILY MEDICINE

## 2018-03-05 PROCEDURE — 99000 SPECIMEN HANDLING OFFICE-LAB: CPT | Performed by: FAMILY MEDICINE

## 2018-03-05 PROCEDURE — 84681 ASSAY OF C-PEPTIDE: CPT | Performed by: FAMILY MEDICINE

## 2018-03-05 PROCEDURE — 82043 UR ALBUMIN QUANTITATIVE: CPT | Performed by: FAMILY MEDICINE

## 2018-03-05 PROCEDURE — 36415 COLL VENOUS BLD VENIPUNCTURE: CPT | Performed by: FAMILY MEDICINE

## 2018-03-05 PROCEDURE — 99214 OFFICE O/P EST MOD 30 MIN: CPT | Performed by: FAMILY MEDICINE

## 2018-03-05 RX ORDER — INSULIN GLARGINE 100 [IU]/ML
15 INJECTION, SOLUTION SUBCUTANEOUS DAILY
Qty: 15 ML | Refills: 0 | Status: SHIPPED | OUTPATIENT
Start: 2018-03-05 | End: 2018-03-05

## 2018-03-05 NOTE — MR AVS SNAPSHOT
After Visit Summary   3/5/2018    Jake Brothers    MRN: [id number]           Patient Information     Date Of Birth          1975        Visit Information        Provider Department      3/5/2018 8:00 AM Caden Mitchell MD [facility]        Today's Diagnoses     Uncontrolled type 2 diabetes mellitus with insulin therapy (H)    -  1    Urinary frequency        Elevated glucose          Care Instructions    Goal blood sugar is less than 130 in the morning and less than 170 2 hrs after eating.    Check at least 2 times per day specifically in the morning.       If your blood sugar is higher than the goal after 1 week - OK to go up by 3 units.       Follow up in a month.           Follow-ups after your visit        Your next 10 appointments already scheduled     Apr 24, 2018  9:00 AM CDT   Lab with  LAB   [facility])    [address] [place] [address]  1st Floor  [facility] [id number] [phone]            Apr 24, 2018 10:00 AM CDT   (Arrive by 9:45 AM)   Return General Liver with Michael Becerra MD   [facility] Hepat[facility] ([facility])    [address] [place] [address]  [address]  [facility] [address] [phone]              Who to contact     If you have questions or need follow up information about today's clinic visit or your schedule please contact [facility] directly at [phone].  Normal or non-critical lab and imaging results will be communicated to you by MyChart, letter or phone within 4 business days after the clinic has received the results. If you do not hear from us within 7 days, please contact the clinic through MyChart or phone. If you have a critical or abnormal lab result, we will notify you by phone as soon as possible.  Submit refill requests through "[employer] or call your pharmacy and they will forward the refill request to us. Please allow 3 business days for your refill to be  "completed.          Additional Information About Your Visit        [employer] Information     [employer] gives you secure access to your electronic health record. If you see a primary care provider, you can also send messages to your care team and make appointments. If you have questions, please call your primary care clinic.  If you do not have a primary care provider, please call [phone] and they will assist you.        Care EveryWhere ID     This is your Care EveryWhere ID. This could be used by other organizations to access your [facility] medical records  [id number]        Your Vitals Were     Pulse Temperature Respirations Height Pulse Oximetry BMI (Body Mass Index)    74 97.7  F (36.5  C) (Oral) 14 5' 10\" (1.778 m) 98% 24.97 kg/m2       Blood Pressure from Last 3 Encounters:   03/05/18 121/85   10/02/17 146/75   08/01/17 128/78    Weight from Last 3 Encounters:   03/05/18 174 lb (78.9 kg)   10/02/17 199 lb 8 oz (90.5 kg)   08/01/17 187 lb 8 oz (85 kg)              We Performed the Following     Albumin Random Urine Quantitative with Creat Ratio     Anti Glutamic Acid Decarboxylas([facility])     C-peptide     Comprehensive metabolic panel     Hemoglobin A1c     IA-2 Antibody     Insulin level     Lipid panel reflex to direct LDL Fasting     TSH with free T4 reflex     UA reflex to Microscopic and Culture     Zinc Transporter 8 Antibody          Today's Medication Changes          These changes are accurate as of 3/5/18  8:56 AM.  If you have any questions, ask your nurse or doctor.               Start taking these medicines.        Dose/Directions    BASAGLAR 100 UNIT/ML injection   Used for:  Uncontrolled type 2 diabetes mellitus with insulin therapy (H)   Started by:  Caden Mitchell MD        Dose:  15 Units   Inject 15 Units Subcutaneous daily   Quantity:  15 mL   Refills:  0       blood glucose lancets standard   Commonly known as:  no brand specified   Used for:  Uncontrolled type 2 diabetes " mellitus with insulin therapy (H)   Started by:  Caden Mitchell MD        Use to test blood sugar 2 times daily or as directed.   Quantity:  200 each   Refills:  3       blood glucose monitoring meter device kit   Commonly known as:  no brand specified   Used for:  Uncontrolled type 2 diabetes mellitus with insulin therapy (H)   Started by:  Caden Mitchell MD        Use to test blood sugar 2 times daily or as directed.   Quantity:  1 kit   Refills:  0       blood glucose monitoring test strip   Commonly known as:  no brand specified   Used for:  Uncontrolled type 2 diabetes mellitus with insulin therapy (H)   Started by:  Caden Mitchell MD        Use to test blood sugars 2 times daily or as directed   Quantity:  200 strip   Refills:  3            Where to get your medicines      These medications were sent to [facility] [place] [id number]  [facility] [address] [facility] [address]     Phone:  [phone]     BASAGLAR 100 UNIT/ML injection    blood glucose lancets standard    blood glucose monitoring meter device kit    blood glucose monitoring test strip                Primary Care Provider Office Phone # Fax #    Caden Mitchell [phone]       [address] [place] [address]        Equal Access to Services     [facility] : Liss stoneo Soprateekali, waaxda luqadaha, qaybta kaalmada adeegyada, christen rodriguez. So [facility] [phone].    ATENCIÓN: Si habla español, tiene a bender disposición servicios gratuitos de asistencia lingüística. Llame al [phone].    We comply with applicable federal civil rights laws and [place] laws. We do not discriminate on the basis of race, color, national origin, age, disability, sex, sexual orientation, or gender identity.            Thank you!     Thank you for choosing [facility]  for your care. Our goal is always to provide you with  excellent care. Hearing back from our patients is one way we can continue to improve our services. Please take a few minutes to complete the written survey that you may receive in the mail after your visit with us. Thank you!             Your Updated Medication List - Protect others around you: Learn how to safely use, store and throw away your medicines at www.disposemymeds.org.          This list is accurate as of 3/5/18  8:56 AM.  Always use your most recent med list.                   Brand Name Dispense Instructions for use Diagnosis    albuterol 108 (90 BASE) MCG/ACT Inhaler    PROAIR HFA/PROVENTIL HFA/VENTOLIN HFA    1 Inhaler    Inhale 2 puffs into the lungs every 6 hours as needed for shortness of breath / dyspnea or wheezing    Cough       BASAGLAR 100 UNIT/ML injection     15 mL    Inject 15 Units Subcutaneous daily    Uncontrolled type 2 diabetes mellitus with insulin therapy (H)       blood glucose lancets standard    no brand specified    200 each    Use to test blood sugar 2 times daily or as directed.    Uncontrolled type 2 diabetes mellitus with insulin therapy (H)       blood glucose monitoring meter device kit    no brand specified    1 kit    Use to test blood sugar 2 times daily or as directed.    Uncontrolled type 2 diabetes mellitus with insulin therapy (H)       blood glucose monitoring test strip    no brand specified    200 strip    Use to test blood sugars 2 times daily or as directed    Uncontrolled type 2 diabetes mellitus with insulin therapy (H)       IRON SUPPLEMENT PO      Take 2 tablets by mouth 2 times daily (with meals)

## 2018-03-05 NOTE — LETTER
March 5, 2018      Jake Brothers  [address] [place] [id number]        To Whom It May Concern:    Jake Brothers was seen in our clinic. He may return to work with the following: no work restrictions on or about 3/12/18.      Sincerely,        Caden Mitchell MD

## 2018-03-05 NOTE — PROGRESS NOTES
SUBJECTIVE:   Jake Brothers is a 43 year old male who presents to clinic today for the following health issues:      Genitourinary symptoms      Duration: 4 weeks     Description:  frequency    Intensity:  mild    Accompanying signs and symptoms (fever/discharge/nausea/vomiting/back or abdominal pain):  Dry mouth , headaches, blurred vision and constantly thristy.     History (frequent UTI's/kidney stones/prostate problems): None  Sexually active: no     Precipitating or alleviating factors: None    Therapies tried and outcome: none      Uncle with type I diabetes. Mother with RA.    Working with gastroenterologist for celiac disease.  Had a cold band of the December.    Problem list and histories reviewed & adjusted, as indicated.  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 No     Caffeine Concern Not Asked     2 cups of coffee in am     Exercise Not Asked     Rides his bike everywhere 3 to 4 times a week     Social History Narrative    ** Merged History Encounter **         Balanced Diet - Yes    Osteoporosis Preventative measures-  Pt does not eat diary    Regular Exercise -  Yes Describe bike every where he goes    Dental Exam up - NO    Eye Exam - YES - Date: yearly    Self Testicular Exam -  No    Do you have any  "concerns about STD's -  No    Abuse: Current or Past (Physical, Sexual or Emotional)- No    Do you feel safe in your environment - Yes    Guns stored in the home - No    Sunscreen used - No    Seatbelts used - Yes    Lipids - NO    Glucose -  NO    Colon Cancer Screening - No    Hemoccults - NO    PSA - NO    Digital Rectal Exam - NO    Immunizations reviewed and up to date - Yes.Meagan ELLIOT Soliz    Pt had a tetanus shot in the past three years. But he is not sure.             No Known Allergies  Patient Active Problem List   Diagnosis     Obesity     CARDIOVASCULAR SCREENING; LDL GOAL LESS THAN 160     Eczema     Elevated LFTs     Cervicalgia     Hepatic steatosis     Celiac disease     Vitiligo     Iron deficiency     Reviewed medications, social history and  past medical and surgical history.    Review of system: for general, respiratory, CVS, GI and psychiatry negative except for noted above.     EXAM:  /85  Pulse 74  Temp 97.7  F (36.5  C) (Oral)  Resp 14  Ht 5' 10\" (1.778 m)  Wt 174 lb (78.9 kg)  SpO2 98%  BMI 24.97 kg/m2  Constitutional: healthy, alert and no distress   Psychiatric: mentation appears normal and affect normal/bright  Cardiovascular: RRR. No murmurs,  Respiratory: negative, Lungs clear. No crackles or wheezing. No tachypnea.       ASSESSMENT / PLAN:  (E11.65,  Z79.4) Uncontrolled type 2 diabetes mellitus with insulin therapy (H)  (primary encounter diagnosis)  Comment: Unfortunately patient is a classic symptom of diabetes.  His A1c is over 15 which is significantly uncontrolled.  He has had some elevated readings in the past but his A1c is over 15 today.  He has some other autoimmune disorder and type 1 diabetes cannot be ruled out completely.  He has had over 200 blood sugar end of last year when he was seen by gastroenterologist but no further follow-up since then.   -Today we are going to start him on insulin and I will put tentative diagnosis of type 2 diabetes while we are " ruling it out type I.  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  He is also from average how to check his blood sugars.  Today we discussed about blood sugar goals.  If he has type 1 diabetes we are going to refer him to endocrine.  Due to time limited today we did not discuss about aspirin, statin, ACE inhibitors eye exam,, feet exam etc  Plan: UA reflex to Microscopic and Culture, blood         glucose monitoring (NO BRAND SPECIFIED) meter         device kit, blood glucose (NO BRAND SPECIFIED)         lancets standard, blood glucose monitoring (NO         BRAND SPECIFIED) test strip, Insulin level,         C-peptide, IA-2 Antibody, Zinc Transporter 8         Antibody, DIABETES EDUCATOR REFERRAL, insulin         pen needle 31G X 6 MM, Glutamic acid         decarboxylase antibody, insulin glargine         (LANTUS SOLOSTAR) 100 UNIT/ML injection,         DISCONTINUED: BASAGLAR 100 UNIT/ML injection,         CANCELED: Anti Glutamic Acid         Decarboxylas([facility])         v    (R35.0) Urinary frequency  Comment:    Plan: Hemoglobin A1c, Lipid panel reflex to direct         LDL Fasting, Albumin Random Urine Quantitative         with Creat Ratio, Comprehensive metabolic         panel, TSH with free T4 reflex, UA reflex to         Microscopic and Culture, CANCELED: UA reflex to        Microscopic and Culture             (R73.09) Elevated glucose  Comment:    Plan: Hemoglobin A1c, Lipid panel reflex to direct         LDL Fasting, Albumin Random Urine Quantitative         with Creat Ratio, Comprehensive metabolic         panel, TSH with free T4 reflex, CANCELED: UA         reflex to Microscopic and Culture                  Patient Instructions   Goal blood sugar is less than 130 in the morning and less than 170 2 hrs after eating.    Check at least 2 times per day specifically in the morning.       If your blood sugar is  higher than the goal after 1 week - OK to go up by 3 units.       Follow up in a month.

## 2018-03-05 NOTE — PATIENT INSTRUCTIONS
Goal blood sugar is less than 130 in the morning and less than 170 2 hrs after eating.    Check at least 2 times per day specifically in the morning.       If your blood sugar is higher than the goal after 1 week - OK to go up by 3 units.       Follow up in a month.

## 2018-03-06 ENCOUNTER — ALLIED HEALTH/NURSE VISIT (OUTPATIENT)
Dept: EDUCATION SERVICES | Facility: CLINIC | Age: 43
End: 2018-03-06
Attending: FAMILY MEDICINE
Payer: COMMERCIAL

## 2018-03-06 DIAGNOSIS — E11.9 DIABETES MELLITUS (H): Primary | ICD-10-CM

## 2018-03-06 LAB — C PEPTIDE SERPL-MCNC: 0.6 NG/ML (ref 0.9–6.9)

## 2018-03-06 PROCEDURE — G0108 DIAB MANAGE TRN  PER INDIV: HCPCS | Performed by: NUTRITIONIST

## 2018-03-06 NOTE — MR AVS SNAPSHOT
After Visit Summary   3/6/2018    Jake Brothers    MRN: [id number]           Patient Information     Date Of Birth          1975        Visit Information        Provider Department      3/6/2018 9:30 AM [facility]        Today's Diagnoses     Diabetes mellitus (H)    -  1       Follow-ups after your visit        Your next 10 appointments already scheduled     Mar 13, 2018 10:30 AM CDT   Office Visit with Azul Michael RD   [facility] [place],  Diabetes Education ([facility])    [address] [place] [address]  [address]  [facility] [id number] [phone]           Bring a current list of meds and any records pertaining to this visit. For Physicals, please bring immunization records and any forms needing to be filled out. Please arrive 10 minutes early to complete paperwork.            Apr 02, 2018  8:00 AM CDT   SHORT with Caden Mitchell MD   [facility] ([facility])    [address] [place] [id number] [phone]            Apr 24, 2018  9:00 AM CDT   Lab with  LAB   [facility] ([facility])    [address]  1st Floor  [facility] [address] [phone]            Apr 24, 2018 10:00 AM CDT   (Arrive by 9:45 AM)   Return General Liver with Michael Becerra MD   [facility] Hepatology ([facility])    [address]  [address]  [facility] [address] [phone]              Who to contact     If you have questions or need follow up information about today's clinic visit or your schedule please contact [facility]  DIABET[facility] EDUCATION directly at [phone].  Normal or non-critical lab and imaging results will be communicated to you by MyChart, letter or phone within 4 business days after the clinic has received the results. If you do not hear from us within 7 days, please contact  the clinic through Snocapt or phone. If you have a critical or abnormal lab result, we will notify you by phone as soon as possible.  Submit refill requests through LiveLeaf or call your pharmacy and they will forward the refill request to us. Please allow 3 business days for your refill to be completed.          Additional Information About Your Visit        [facility] Information     LiveLeaf gives you secure access to your electronic health record. If you see a primary care provider, you can also send messages to your care team and make appointments. If you have questions, please call your primary care clinic.  If you do not have a primary care provider, please call [phone] and they will assist you.        Care EveryWhere ID     This is your Care EveryWhere ID. This could be used by other organizations to access your [facility] medical records  [id number]         Blood Pressure from Last 3 Encounters:   03/05/18 121/85   10/02/17 146/75   08/01/17 128/78    Weight from Last 3 Encounters:   03/05/18 78.9 kg (174 lb)   10/02/17 90.5 kg (199 lb 8 oz)   08/01/17 85 kg (187 lb 8 oz)              We Performed the Following     DIABETES EDUCATION - Individual  []        Primary Care Provider Office Phone # Fax #    Caden Ilya Mitchell [phone] [address] [place] [id number]        Equal Access to Services     MARZENA BROCK : Hadii anjelica bauman hadasho Soparish, waaxda luqadaha, qaybta kaalmada jimmie, christen rodriguez. So [facility] [phone].    ATENCIÓN: Si habla español, tiene a bender disposición servicios gratuitos de asistencia lingüística. Sangita al [phone].    We comply with applicable federal civil rights laws and [place] laws. We do not discriminate on the basis of race, color, national origin, age, disability, sex, sexual orientation, or gender identity.            Thank you!     Thank you for choosing [facility]  DIABETES EDUCATION  for  your care. Our goal is always to provide you with excellent care. Hearing back from our patients is one way we can continue to improve our services. Please take a few minutes to complete the written survey that you may receive in the mail after your visit with us. Thank you!             Your Updated Medication List - Protect others around you: Learn how to safely use, store and throw away your medicines at www.disposemymeds.org.          This list is accurate as of 3/6/18 11:59 PM.  Always use your most recent med list.                   Brand Name Dispense Instructions for use Diagnosis    albuterol 108 (90 BASE) MCG/ACT Inhaler    PROAIR HFA/PROVENTIL HFA/VENTOLIN HFA    1 Inhaler    Inhale 2 puffs into the lungs every 6 hours as needed for shortness of breath / dyspnea or wheezing    Cough       blood glucose lancets standard    no brand specified    200 each    Use to test blood sugar 2 times daily or as directed.    Uncontrolled type 2 diabetes mellitus with insulin therapy (H)       blood glucose monitoring meter device kit    no brand specified    1 kit    Use to test blood sugar 2 times daily or as directed.    Uncontrolled type 2 diabetes mellitus with insulin therapy (H)       blood glucose monitoring test strip    no brand specified    200 strip    Use to test blood sugars 2 times daily or as directed    Uncontrolled type 2 diabetes mellitus with insulin therapy (H)       insulin glargine 100 UNIT/ML injection    LANTUS SOLOSTAR    15 mL    Inject 15 Units Subcutaneous At Bedtime    Uncontrolled type 2 diabetes mellitus with insulin therapy (H)       insulin pen needle 31G X 6 MM     100 each    Use 1 daily or as directed.    Uncontrolled type 2 diabetes mellitus with insulin therapy (H)       IRON SUPPLEMENT PO      Take 2 tablets by mouth 2 times daily (with meals)

## 2018-03-07 LAB — GAD65 AB SER IA-ACNC: >250 IU/ML (ref 0–5)

## 2018-03-08 LAB
ISLET CELL512 AB SER-ACNC: <0.8 U/ML (ref 0–0.8)
ZNT8 AB SERPL IA-ACNC: 10 U/ML (ref 0–15)

## 2018-03-08 NOTE — PROGRESS NOTES
Diabetes Self Management Training: Initial Assessment Visit for Newly Diagnosed Patients (Complete AADE Goals Flowsheet)    Jake Brothers presents today for education related to type 2 versus type 1 diabetes. Awaiting lab values.     He is accompanied by spouse    Patient's diabetes management related comments/concerns: Patient was diagnosed with diabetes yesterday. Awaiting lab values to confirm type 1 versus type 2 diabetes. Of note, patient was diagnosed with Celiac disease in May of 2017 and has made changes to his diet. He went to clinic yesterday with symptoms of frequent urination and thirst and was diagnosed with diabetes. He was also prescribed Lantus.     Patient's emotional response to diabetes: expresses readiness to learn, shock and concern for health and well-being    Patient would like this visit to be focused around the following diabetes-related behaviors and goals: Monitoring and monitoring and insulin injection technique.     ASSESSMENT:  Patient Problem List and Family Medical History reviewed for relevant medical history, current medical status, and diabetes risk factors.    Current Diabetes Management per Patient:  Taking diabetes medications?   yes:     Diabetes Medication(s)     Insulin Sig    insulin glargine (LANTUS SOLOSTAR) 100 UNIT/ML injection Inject 15 Units Subcutaneous At Bedtime      , Problems taking diabetes medications regularly? Patient did not start Lantus last night; reports he had some some concerns and wanted to give it a day but will start today. Wanted to ask if he could perhaps control glucose with diet. Was also concerned that 15 units may be too high a dose to start.  I reinforced starting insulin as patient's glucose was 457 in diabetes edu. His insulin needs may be as high as 39 units based on his weight, hearing this helped put his mind at ease about the starting dose. We also discussed that if type 2 diabetes (versus type 1) is his diagnosis that it may be  "possible to switch to oral medication, other injectables or diet control in the future, but at this time (A1C >15) insulin is recommended to have the best potential to lower glucose in a timely manner.Patient agreed to start insulin when he gets home. This will be a convenient time for him to take the insulin going forward.     Patient glucose self monitoring as follows: BG meter taught today. Patient brought meter to appointment.   BG meter: One Touch Ultra 2 meter  BG results: not available     BG values are: unable to assess  Patient's most recent   Lab Results   Component Value Date    A1C >15.0 03/05/2018    is not meeting goal of <7.0    Nutrition:  Patient follows a gluten free diet. He has some understanding of carbohydrate but would like additional information today.     Physical Activity:    Not discussed today.     Vitals:  There were no vitals taken for this visit.  Estimated body mass index is 24.97 kg/(m^2) as calculated from the following:    Height as of 3/5/18: 1.778 m (5' 10\").    Weight as of 3/5/18: 78.9 kg (174 lb).   Last 3 BP:   BP Readings from Last 3 Encounters:   03/05/18 121/85   10/02/17 146/75   08/01/17 128/78       History   Smoking Status     Former Smoker   Smokeless Tobacco     Never Used       Labs:  Lab Results   Component Value Date    A1C >15.0 03/05/2018     Lab Results   Component Value Date     03/05/2018     Lab Results   Component Value Date     03/05/2018     HDL Cholesterol   Date Value Ref Range Status   03/05/2018 25 (L) >39 mg/dL Final   ]  GFR Estimate   Date Value Ref Range Status   03/05/2018 >90 >60 mL/min/1.7m2 Final     Comment:     Non  GFR Calc     GFR Estimate If Black   Date Value Ref Range Status   03/05/2018 >90 >60 mL/min/1.7m2 Final     Comment:      GFR Calc     Lab Results   Component Value Date    CR 0.62 03/05/2018     No results found for: MICROALBUMIN    Socio/Economic Considerations:    Support system: " spouse/significant other    Health Beliefs and Attitudes:   Patient Activation Measure Survey Score:  SALINA Score (Last Two) 1/8/2013   SALINA Raw Score 43   Activation Score 68.5   SALINA Level 4       Stage of Change: ACTION (Actively working towards change)      Diabetes knowledge and skills assessment:     Patient needs further education on the following diabetes management concepts: Healthy Eating, Monitoring and Taking Medication    Barriers to Learning Assessment: No Barriers identified    Based on learning assessment above, most appropriate setting for further diabetes education would be: Individual setting.    INTERVENTION:   Education provided today on:  AADE Self-Care Behaviors:  Healthy Eating: carbohydrate counting basics and label reading  Monitoring: purpose, proper technique, log and interpret results, individual blood glucose targets, frequency of monitoring and proper sharps disposal  Taking Medication: action of prescribed medication, drawing up, administering and storing injectable diabetes medications, proper site selection and rotation for injections, side effects of prescribed medications and when to take medications  Patient was instructed on One Touch Ultra 2 meter and was able to provide an accurate return demonstration. Patient's blood glucose reading today was 457 mg/dL.    Opportunities for ongoing education and support in diabetes-self management were discussed.    Pt verbalized understanding of concepts discussed and recommendations provided today.       Education Materials Provided:  Advance Understanding Diabetes Booklet, Advance Taking Charge of Your Diabetes Book, Safe Disposal Options for Needles & Syringes, Carbohydrate Counting, Lantus Insulin information, Hypoglycemia Signs and Symptoms and One Touch Ultra 2 meter kit    PLAN:  See Patient Instructions for co-developed, patient-stated behavior change goals.  Meal Plan Recommendation: eat 3 meals a day, have small snacks between  meals, if needed and Aim for <60g carb servings at meals and <30g carb servings at snacks. Start looking at labels, use resources, use calorie yovany. Start getting familiar with how much carbohydrate is in the foods you eat.   Check blood sugars before meals and at bedtime. Four times per day.  Patient will need referral to Endocrinology if diagnosed with type 1 diabetes.  Patient will need to be scheduled with RN CDE in the future for education for new diagnosis of type 1 or type 2 diabetes  Patient agrees to start insulin as prescribed  AVS printed and provided to patient today.    FOLLOW-UP:  Chart routed to referring provider.  Phone follow up on Friday to review glucose readings and insulin adjustment.  In person follow up in one week. 3/13.  Additional appointments to be scheduled once diagnosis is confirmed.     Time Spent: 60 minutes  Encounter Type: Individual    Any diabetes medication dose changes were made via the CDE Protocol and Collaborative Practice Agreement with the patient's referring provider. A copy of this encounter was shared with the provider.

## 2018-03-09 ENCOUNTER — TELEPHONE (OUTPATIENT)
Dept: EDUCATION SERVICES | Facility: CLINIC | Age: 43
End: 2018-03-09

## 2018-03-09 ENCOUNTER — TELEPHONE (OUTPATIENT)
Dept: FAMILY MEDICINE | Facility: CLINIC | Age: 43
End: 2018-03-09

## 2018-03-09 NOTE — TELEPHONE ENCOUNTER
Call patient with results and he has most likely has type 1 diabetes.  He needs to see an endocrinologist for further evaluation and treatment plan.  I will also notify diabetic educator whom he recently so who mentioned she is going to facilitate he is endocrinology appointment.  He is going to see her next week.  Patient understood with the plan and he is going to increase his insulin use as I send him a message earlier.  He will also need a short acting insulin but that can wait until he see a endocrinologist.

## 2018-03-09 NOTE — TELEPHONE ENCOUNTER
"Spoke with Jake via phone. He is now scheduled with RN MALACHI on 3/22 and I sent a message to the Endo nurse triage team to help get patient scheduled with an Endocrinologist for newly diagnosed type 1 diabetes.  Patient is taking 21 units of Lantus. His fasting glucose has been in the low 300s and during the day in the 380s mostly. He is checking as much as eight times per day.  He is eating around 45 grams of carbohydrate with meals and having carb free snacks.  Yesterday his glucose was 280 and he says he felt a little dizzy. That hasn't happened since. Otherwise says he has been feeling \"ok\" and is having less frequent urination. Recommended to increase insulin per protocol in two days to 24 units if glucose remains above goal. Patient will call with any questions or concerns.   Follow up 3/13. Azul Michael, DANO, LD, CDE  3/9/2018   5:57 PM    "

## 2018-03-09 NOTE — PROGRESS NOTES
As I  talked to you over phone, you most likely have type 1 diabetes and I have done an endocrinologist referral.  If you still have a trouble getting in to see endocrine  after you talk to diabetic educator next week please let me know.    Caden Mitchell MD  [facility]

## 2018-03-13 ENCOUNTER — OFFICE VISIT (OUTPATIENT)
Dept: EDUCATION SERVICES | Facility: CLINIC | Age: 43
End: 2018-03-13
Attending: FAMILY MEDICINE
Payer: COMMERCIAL

## 2018-03-13 PROCEDURE — G0108 DIAB MANAGE TRN  PER INDIV: HCPCS | Performed by: NUTRITIONIST

## 2018-03-13 NOTE — PROGRESS NOTES
"  Diabetes Self Management Training: Follow-up Visit    Jake Brothers presents today for education Type 1 diabetes.    He is accompanied by his spouse    Patient's diabetes management related comments/concerns: Patient is starting to feel better. Waking less at night. Has blurry vision which makes working hard so he has taken some time off. He has been taking Lantus daily, tracking his diet, is eager to meet with an Endocrinologist.     ASSESSMENT:  Patient Problem List reviewed for relevant medical history and current medical status.    Current Diabetes Management per Patient:  Taking diabetes medications? Lantus 24 units around 11am    Patient glucose self monitoring as follows: multiple times per day.   BG results: significant improvement in the past week. His glucose is currently running in the low 200-mid 300s.      BG values are: Not in goal  Patient's most recent   Lab Results   Component Value Date    A1C >15.0 03/05/2018    is not meeting goal of <7.0    Nutrition:  Patient is keeping a written log of his diet.  Feels extremely hungry and is needing to snack a lot. He is trying to only have low or no carb snacks.  He has started reading labels and looking at carbs and is keeping carb intake at meals under 60 grams.  He has questions today about carbohydrate now that he has been diagnosed with type 1 diabetes.   He follows a gluten free diet. Diet is generally well balanced.     Vitals:  There were no vitals taken for this visit.  Estimated body mass index is 24.97 kg/(m^2) as calculated from the following:    Height as of 3/5/18: 1.778 m (5' 10\").    Weight as of 3/5/18: 78.9 kg (174 lb).   Last 3 BP:   BP Readings from Last 3 Encounters:   03/05/18 121/85   10/02/17 146/75   08/01/17 128/78       History   Smoking Status     Former Smoker   Smokeless Tobacco     Never Used       Labs:  Lab Results   Component Value Date    A1C >15.0 03/05/2018     Lab Results   Component Value Date     03/05/2018 "     Lab Results   Component Value Date     03/05/2018     HDL Cholesterol   Date Value Ref Range Status   03/05/2018 25 (L) >39 mg/dL Final   ]  GFR Estimate   Date Value Ref Range Status   03/05/2018 >90 >60 mL/min/1.7m2 Final     Comment:     Non  GFR Calc     GFR Estimate If Black   Date Value Ref Range Status   03/05/2018 >90 >60 mL/min/1.7m2 Final     Comment:      GFR Calc     Lab Results   Component Value Date    CR 0.62 03/05/2018     No results found for: MICROALBUMIN    Health Beliefs and Attitudes:   Patient Activation Measure Survey Score:  SALINA Score (Last Two) 1/8/2013   SALINA Raw Score 43   Activation Score 68.5   SALINA Level 4       Stage of Change: ACTION (Actively working towards change)    Diabetes knowledge and skills assessment:     Patient is knowledgeable in diabetes management concepts related to: Monitoring    Patient needs further education on the following diabetes management concepts: Medication management and Carbohydrate Counting    Barriers to Learning Assessment: No Barriers identified    Based on learning assessment above, most appropriate setting for further diabetes education would be: Individual setting.    INTERVENTION:    Education provided today on:  AADE Self-Care Behaviors:  Healthy Eating: details of carbohydrate counting - using resources (label reading, calorie yovany, written guide) to determine the carbohydrate content of foods, awareness of serving size, benefits of measuring or weighing food, what is an insulin to carbohydrate ratio and how to use it, what is correction scale and how to use it to calculate total meal dose. Patient will continue to keep food records for now. Patient demonstrated understanding.  Taking Medication: Reviewed Basal/Bolus insulin regimen, timing of injections, onset, peak and duration of insulins.     Opportunities for ongoing education and support in diabetes-self management were discussed.    Pt verbalized  understanding of concepts discussed and recommendations provided today.       PLAN:  See Patient Instructions for co-developed, patient-stated behavior change goals.  Continue to test glucose before meals.  Continue to write down what you eat. Start counting carbohydrate in everything you eat.  AVS printed and provided to patient today.    FOLLOW-UP:  Chart routed to referring provider.  Appointment with Kristin Stockton RN CDE scheduled for 3/22. Follow up with me to be determined after that.   Message sent to Endocrinology team to facilitate Endocrinology appointment.     Time Spent: 60 minutes  Encounter Type: Individual

## 2018-03-13 NOTE — MR AVS SNAPSHOT
After Visit Summary   3/13/2018    Jake Brothers    MRN: [id number]           Patient Information     Date Of Birth          1975        Visit Information        Provider Department      3/13/2018 10:30 AM Azul Michael [facility]        Today's Diagnoses     Uncontrolled type 1 diabetes mellitus without complication (H)    -  1    Uncontrolled type 2 diabetes mellitus with insulin therapy (H)           Follow-ups after your visit        Your next 10 appointments already scheduled     Mar 22, 2018  3:30 PM CDT   (Arrive by 3:15 PM)   Office Visit with Kristin Stockton RN   [facility] Diabetes ([facility])    [address] [place] [address]  3rd Floor  [facility] [address] [phone]           Bring a current list of meds and any records pertaining to this visit. For Physicals, please bring immunization records and any forms needing to be filled out. Please arrive 10 minutes early to complete paperwork.            Apr 02, 2018  8:00 AM CDT   SHORT with Caden Mitchell MD   [facility] ([facility])    [address] [place] [address] [phone]            Apr 24, 2018  9:00 AM CDT   Lab with  LAB   [facility] Lab ([facility])    [address] [place] [address] [phone]            Apr 24, 2018 10:00 AM CDT   (Arrive by 9:45 AM)   Return General Liver with Michael Becerra MD   [facility] Hepatology ([facility])    [address] [place] [address] [phone]              Who to contact     If you have questions or need follow up information about today's clinic visit or your schedule please contact [facility]  [facility] EDUC[facility] directly at [phone].  Normal or non-critical lab and imaging results will be communicated to you by MyChart, letter or phone within 4 business days  after the clinic has received the results. If you do not hear from us within 7 days, please contact the clinic through [employer] or phone. If you have a critical or abnormal lab result, we will notify you by phone as soon as possible.  Submit refill requests through [employer] or call your pharmacy and they will forward the refill request to us. Please allow 3 business days for your refill to be completed.          Additional Information About Your Visit        [employer] Information     [employer] gives you secure access to your electronic health record. If you see a primary care provider, you can also send messages to your care team and make appointments. If you have questions, please call your primary care clinic.  If you do not have a primary care provider, please call [phone] and they will assist you.        Care EveryWhere ID     This is your Care EveryWhere ID. This could be used by other organizations to access your [facility] medical records  [id number]         Blood Pressure from Last 3 Encounters:   03/05/18 121/85   10/02/17 146/75   08/01/17 128/78    Weight from Last 3 Encounters:   03/05/18 78.9 kg (174 lb)   10/02/17 90.5 kg (199 lb 8 oz)   08/01/17 85 kg (187 lb 8 oz)              We Performed the Following     DIABETES EDUCATION - Individual  []          Today's Medication Changes          These changes are accurate as of 3/13/18  3:29 PM.  If you have any questions, ask your nurse or doctor.               These medicines have changed or have updated prescriptions.        Dose/Directions    insulin glargine 100 UNIT/ML injection   Commonly known as:  LANTUS SOLOSTAR   This may have changed:  how much to take   Used for:  Uncontrolled type 2 diabetes mellitus with insulin therapy (H)        Dose:  24 Units   Inject 24 Units Subcutaneous At Bedtime   Quantity:  15 mL   Refills:  0            Where to get your medicines      These medications were sent to [facility] [place], [place] -  [address]  [address], [facility] [address]     Phone:  [phone]     insulin glargine 100 UNIT/ML injection                Primary Care Provider Office Phone # Fax #    Caden Ilya Mitchell [phone]       [address] [facility] [address]        Equal Access to Services     JIMENEZ BROCK : Hadii aad ku hadasho Soomaali, waaxda luqadaha, qaybta kaalmada adeegyada, waxay idiin hayaan adeeg kharash la'aan ah. So [facility] [phone].    ATENCIÓN: Si habla español, tiene a bender disposición servicios gratuitos de asistencia lingüística. Sangita al [phone].    We comply with applicable federal civil rights laws and [place] laws. We do not discriminate on the basis of race, color, national origin, age, disability, sex, sexual orientation, or gender identity.            Thank you!     Thank you for choosing [facility]  DIABETES EDUCATION  for your care. Our goal is always to provide you with excellent care. Hearing back from our patients is one way we can continue to improve our services. Please take a few minutes to complete the written survey that you may receive in the mail after your visit with us. Thank you!             Your Updated Medication List - Protect others around you: Learn how to safely use, store and throw away your medicines at www.disposemymeds.org.          This list is accurate as of 3/13/18  3:29 PM.  Always use your most recent med list.                   Brand Name Dispense Instructions for use Diagnosis    albuterol 108 (90 BASE) MCG/ACT Inhaler    PROAIR HFA/PROVENTIL HFA/VENTOLIN HFA    1 Inhaler    Inhale 2 puffs into the lungs every 6 hours as needed for shortness of breath / dyspnea or wheezing    Cough       blood glucose lancets standard    no brand specified    200 each    Use to test blood sugar 2 times daily or as directed.    Uncontrolled type 2 diabetes mellitus with insulin therapy (H)       blood glucose monitoring meter device kit    no  brand specified    1 kit    Use to test blood sugar 2 times daily or as directed.    Uncontrolled type 2 diabetes mellitus with insulin therapy (H)       blood glucose monitoring test strip    no brand specified    200 strip    Use to test blood sugars 2 times daily or as directed    Uncontrolled type 2 diabetes mellitus with insulin therapy (H)       insulin glargine 100 UNIT/ML injection    LANTUS SOLOSTAR    15 mL    Inject 24 Units Subcutaneous At Bedtime    Uncontrolled type 2 diabetes mellitus with insulin therapy (H)       insulin pen needle 31G X 6 MM     100 each    Use 1 daily or as directed.    Uncontrolled type 2 diabetes mellitus with insulin therapy (H)       IRON SUPPLEMENT PO      Take 2 tablets by mouth 2 times daily (with meals)

## 2018-03-14 ENCOUNTER — MYC MEDICAL ADVICE (OUTPATIENT)
Dept: FAMILY MEDICINE | Facility: CLINIC | Age: 43
End: 2018-03-14

## 2018-03-14 ENCOUNTER — OFFICE VISIT (OUTPATIENT)
Dept: FAMILY MEDICINE | Facility: CLINIC | Age: 43
End: 2018-03-14
Payer: COMMERCIAL

## 2018-03-14 VITALS
SYSTOLIC BLOOD PRESSURE: 119 MMHG | OXYGEN SATURATION: 98 % | WEIGHT: 183.5 LBS | TEMPERATURE: 97.8 F | HEART RATE: 76 BPM | HEIGHT: 70 IN | BODY MASS INDEX: 26.27 KG/M2 | DIASTOLIC BLOOD PRESSURE: 85 MMHG | RESPIRATION RATE: 16 BRPM

## 2018-03-14 LAB — HBA1C MFR BLD: >15 % (ref 4.3–6)

## 2018-03-14 PROCEDURE — 83036 HEMOGLOBIN GLYCOSYLATED A1C: CPT | Performed by: FAMILY MEDICINE

## 2018-03-14 PROCEDURE — 36415 COLL VENOUS BLD VENIPUNCTURE: CPT | Performed by: FAMILY MEDICINE

## 2018-03-14 PROCEDURE — 99214 OFFICE O/P EST MOD 30 MIN: CPT | Performed by: FAMILY MEDICINE

## 2018-03-14 RX ORDER — ATORVASTATIN CALCIUM 20 MG/1
20 TABLET, FILM COATED ORAL DAILY
Qty: 90 TABLET | Refills: 3 | Status: SHIPPED | OUTPATIENT
Start: 2018-03-14 | End: 2019-09-09

## 2018-03-14 NOTE — LETTER
March 14, 2018      Jake Brothers  [address] [place] [address]      To Whom It May Concern:    Jake Brothers was seen in our clinic. He may return to work with the following: no work restrictions on or about 3/29/18. Please excuse him from work 3/13/18 to 3/28/18.      Sincerely,        Caden Mitchell MD

## 2018-03-14 NOTE — LETTER
[facility]  [address] [place] [id number] [phone]    2018    Re: Jake Brothers  [address] [address] [phone] (home) [phone] (work)    : 1975      To Whom It May Concern:      Jake Brothers is under my care for health issues. Please excuse him from work until seen by a specialty care clinic.          Sincerely,          Dr. Caden Mitchell/len

## 2018-03-14 NOTE — TELEPHONE ENCOUNTER
Please ask him to see me in the clinic sooner than April 4th and I can start him on short acting insulin to keep his blood sugar better controlled. He should call endocrine to get in instead of waiting further to get their response.

## 2018-03-14 NOTE — MR AVS SNAPSHOT
After Visit Summary   3/14/2018    Jake Brothers    MRN: [id number]           Patient Information     Date Of Birth          1975        Visit Information        Provider Department      3/14/2018 11:40 AM Caden Mitchell MD [facility]        Today's Diagnoses     Uncontrolled type 1 diabetes mellitus without complication (H)    -  1       Follow-ups after your visit        Your next 10 appointments already scheduled     Mar 22, 2018  3:30 PM CDT   (Arrive by 3:15 PM)   Office Visit with Kristin Stockton RN   [facility] Diabetes ([facility])    [address] [place] [address]  [address] [facility] [id number] [phone]           Bring a current list of meds and any records pertaining to this visit. For Physicals, please bring immunization records and any forms needing to be filled out. Please arrive 10 minutes early to complete paperwork.            Mar 28, 2018 12:00 PM CDT   (Arrive by 11:40 AM)   NEW DIABETES with Renata Mcfadden MD   [facility] Endocrinology ([facility])    [address] [place] [address]  [address] [facility] [phone] [phone]            Apr 02, 2018  8:00 AM CDT   SHORT with Caden Mitchell MD   [facility] ([facility])    [address] [place] [address] [phone]            Apr 24, 2018  9:00 AM CDT   Lab with  LAB   [facility] Lab ([facility])    [address] [place] [id number] [phone]            Apr 24, 2018 10:00 AM CDT   (Arrive by 9:45 AM)   Return General Liver with Michael Becerra MD   [facility] Hepatology ([facility])    [address] [place] [phone] [phone]              Who to contact     If you have questions or need follow up information about today's clinic visit or your schedule please contact [facility]  "[facility] directly at [phone].  Normal or non-critical lab and imaging results will be communicated to you by MyChart, letter or phone within 4 business days after the clinic has received the results. If you do not hear from us within 7 days, please contact the clinic through [employer] or phone. If you have a critical or abnormal lab result, we will notify you by phone as soon as possible.  Submit refill requests through [employer] or call your pharmacy and they will forward the refill request to us. Please allow 3 business days for your refill to be completed.          Additional Information About Your Visit        [employer] Information     [employer] gives you secure access to your electronic health record. If you see a primary care provider, you can also send messages to your care team and make appointments. If you have questions, please call your primary care clinic.  If you do not have a primary care provider, please call [phone] and they will assist you.        Care EveryWhere ID     This is your Care EveryWhere ID. This could be used by other organizations to access your [facility] medical records  [id number]        Your Vitals Were     Pulse Temperature Respirations Height Pulse Oximetry BMI (Body Mass Index)    76 97.8  F (36.6  C) (Oral) 16 5' 10\" (1.778 m) 98% 26.33 kg/m2       Blood Pressure from Last 3 Encounters:   03/14/18 119/85   03/05/18 121/85   10/02/17 146/75    Weight from Last 3 Encounters:   03/14/18 183 lb 8 oz (83.2 kg)   03/05/18 174 lb (78.9 kg)   10/02/17 199 lb 8 oz (90.5 kg)              We Performed the Following     Hemoglobin A1c          Today's Medication Changes          These changes are accurate as of 3/14/18 12:31 PM.  If you have any questions, ask your nurse or doctor.               Start taking these medicines.        Dose/Directions    atorvastatin 20 MG tablet   Commonly known as:  LIPITOR   Used for:  Uncontrolled type 1 diabetes mellitus without complication (H) "   Started by:  Cadne Mitchell MD        Dose:  20 mg   Take 1 tablet (20 mg) by mouth daily   Quantity:  90 tablet   Refills:  3       insulin aspart 100 UNIT/ML injection   Commonly known as:  NovoLOG FLEXPEN   Used for:  Uncontrolled type 1 diabetes mellitus without complication (H)   Started by:  Caden Mitchell MD        5 units before breakfast, 5 units before lunch, 5 units before dinner   Quantity:  15 mL   Refills:  1            Where to get your medicines      These medications were sent to [facility] [place] [address]  [address] [facility] [address]     Phone:  [phone]     atorvastatin 20 MG tablet    insulin aspart 100 UNIT/ML injection                Primary Care Provider Office Phone # Fax #    Caden Mitchell [phone]       [address]  [facility] [address]        Equal Access to Services     [facility]: Hadii anjelica bauman hadasho Soparish, waaxda luqadaha, qaybta kaalmada adeegyada, waxay laurain haygilberto collins . So [facility] [phone].    ATENCIÓN: Si habla español, tiene a bender disposición servicios gratuitos de asistencia lingüística. [facility] [phone].    We comply with applicable federal civil rights laws and [place] laws. We do not discriminate on the basis of race, color, national origin, age, disability, sex, sexual orientation, or gender identity.            Thank you!     Thank you for choosing [facility]  for your care. Our goal is always to provide you with excellent care. Hearing back from our patients is one way we can continue to improve our services. Please take a few minutes to complete the written survey that you may receive in the mail after your visit with us. Thank you!             Your Updated Medication List - Protect others around you: Learn how to safely use, store and throw away your medicines at www.disposemymeds.org.          This list is accurate  as of 3/14/18 12:31 PM.  Always use your most recent med list.                   Brand Name Dispense Instructions for use Diagnosis    albuterol 108 (90 BASE) MCG/ACT Inhaler    PROAIR HFA/PROVENTIL HFA/VENTOLIN HFA    1 Inhaler    Inhale 2 puffs into the lungs every 6 hours as needed for shortness of breath / dyspnea or wheezing    Cough       aspirin 81 MG tablet     30 tablet    Take 1 tablet (81 mg) by mouth daily        atorvastatin 20 MG tablet    LIPITOR    90 tablet    Take 1 tablet (20 mg) by mouth daily    Uncontrolled type 1 diabetes mellitus without complication (H)       blood glucose lancets standard    no brand specified    200 each    Use to test blood sugar 2 times daily or as directed.    Uncontrolled type 2 diabetes mellitus with insulin therapy (H)       blood glucose monitoring meter device kit    no brand specified    1 kit    Use to test blood sugar 2 times daily or as directed.    Uncontrolled type 2 diabetes mellitus with insulin therapy (H)       blood glucose monitoring test strip    no brand specified    200 strip    Use to test blood sugars 2 times daily or as directed    Uncontrolled type 2 diabetes mellitus with insulin therapy (H)       insulin aspart 100 UNIT/ML injection    NovoLOG FLEXPEN    15 mL    5 units before breakfast, 5 units before lunch, 5 units before dinner    Uncontrolled type 1 diabetes mellitus without complication (H)       insulin glargine 100 UNIT/ML injection    LANTUS SOLOSTAR    15 mL    Inject 24 Units Subcutaneous At Bedtime    Uncontrolled type 2 diabetes mellitus with insulin therapy (H)       insulin pen needle 31G X 6 MM     100 each    Use 1 daily or as directed.    Uncontrolled type 2 diabetes mellitus with insulin therapy (H)       IRON SUPPLEMENT PO      Take 2 tablets by mouth 2 times daily (with meals)

## 2018-03-14 NOTE — TELEPHONE ENCOUNTER
Started a new letter.  How do you want this phrased?    I asked pt to call the endocrine to get scheduled.  SHERYL Vaughn

## 2018-03-14 NOTE — PROGRESS NOTES
SUBJECTIVE:   Jake Brothers is a 43 year old male who presents to clinic today for the following health issues:      Diabetes Follow-up    Patient is checking blood sugars: 6-7 times daily.   Readings are in 300's  Blood sugar testing frequency justification: Uncontrolled diabetes  Results are as follows:       am -       noon -        supper -        bedtime -          postprandial -     Diabetic concerns: blood sugar frequently over 200     Symptoms of hypoglycemia (low blood sugar): shaky, dizzy, weak, lethargy, blurred vision, confusion, irritable      Paresthesias (numbness or burning in feet) or sores: No     Date of last diabetic eye exam: have not done     BP Readings from Last 2 Encounters:   03/05/18 121/85   10/02/17 146/75     Hemoglobin A1C (%)   Date Value   03/05/2018 >15.0 (H)     LDL Cholesterol Calculated (mg/dL)   Date Value   03/05/2018 108 (H)   08/16/2016 52       Amount of exercise or physical activity: 6-7 days/week for an average of 30-45 minutes    Problems taking medications regularly: No    Medication side effects: none    Diet: regular (no restrictions) trying to cut out carbs    Using 24 units of lantus - day 2.   Blood sugars are in 280s range. Goes to 350 with eating.    It used to be in upper 400 range.     Problem list and histories reviewed & adjusted, as indicated.  Additional history: as documented    Labs reviewed in EPIC    Reviewed and updated as needed this visit by clinical staff  Tobacco  Allergies  Meds  Med Hx  Surg Hx  Fam Hx  Soc Hx      Reviewed and updated as needed this visit by Provider      Social History     Social History     Marital status:      Spouse name: N/A     Number of children: 0     Years of education: N/A     Occupational History     Adult Psychiatry [employer]     CNA     Social History Main Topics     Smoking status: Former Smoker     Smokeless tobacco: Never Used     Alcohol use No      Comment: quit 03/2017  "    Drug use: No     Sexual activity: Yes     Partners: Female     Birth control/ protection: Condom     Other Topics Concern     Parent/Sibling W/ Cabg, Mi Or Angioplasty Before 65f 55m? No     Caffeine Concern Not Asked     2 cups of coffee in am     Exercise Not Asked     Rides his bike everywhere 3 to 4 times a week     Social History Narrative    ** Merged History Encounter **         Balanced Diet - Yes    Osteoporosis Preventative measures-  Pt does not eat diary    Regular Exercise -  Yes Describe bike every where he goes    Dental Exam up - NO    Eye Exam - YES - Date: yearly    Self Testicular Exam -  No    Do you have any concerns about STD's -  No    Abuse: Current or Past (Physical, Sexual or Emotional)- No    Do you feel safe in your environment - Yes    Guns stored in the home - No    Sunscreen used - No    Seatbelts used - Yes    Lipids - NO    Glucose -  NO    Colon Cancer Screening - No    Hemoccults - NO    PSA - NO    Digital Rectal Exam - NO    Immunizations reviewed and up to date - Yes.Meagan Soliz MA    Pt had a tetanus shot in the past three years. But he is not sure.             No Known Allergies  Patient Active Problem List   Diagnosis     Obesity     CARDIOVASCULAR SCREENING; LDL GOAL LESS THAN 160     Eczema     Elevated LFTs     Cervicalgia     Hepatic steatosis     Celiac disease     Vitiligo     Iron deficiency     Uncontrolled type 1 diabetes mellitus without complication (H)     Reviewed medications, social history and  past medical and surgical history.    Review of system: for general, respiratory, CVS, GI and psychiatry negative except for noted above.     EXAM:  /85 (Cuff Size: Adult Regular)  Pulse 76  Temp 97.8  F (36.6  C) (Oral)  Resp 16  Ht 5' 10\" (1.778 m)  Wt 183 lb 8 oz (83.2 kg)  SpO2 98%  BMI 26.33 kg/m2  Constitutional: healthy, alert and no distress   Psychiatric: mentation appears normal and affect normal/bright  Cardiovascular: RRR. No " murmurs,  Respiratory: negative, Lungs clear. No crackles or wheezing. No tachypnea.        ASSESSMENT / PLAN:  (E10.65) Uncontrolled type 1 diabetes mellitus without complication (H)  (primary encounter diagnosis)  Comment: Patient has had minimal improvement in his blood sugars.  His blood sugars dropped down from 400 to 300 range.  He is increasing his Lantus and he is currently at 24 units.  His started at 15 units a week ago.  He is tolerating it very well without any concern of hypoglycemia.  He has had an issue getting into see an endocrinologist and I recommended him to come and see me so that we can start him on a short acting insulin.  We will start him on NovoLog.  Fortunately now he is scheduled to see both the diabetic educator nurse and also an endocrinologist and I recommended him to start with the fixed dose of NovoLog rather than a sliding scale as he is seeing them next week.  He will certainly need adjustment of his both Lantus and NovoLog dosing but it can wait until he sees endocrinologist.     He works as a psych associate and with his current some blurry vision and new onset of diabetes type 1 it is reasonable to hold off on work for another few weeks. I have provided him the note for his work.  -We also talked about statin and aspirin.  We will start him on statin.  Plan: Hemoglobin A1c, atorvastatin (LIPITOR) 20 MG         tablet, insulin aspart (NOVOLOG FLEXPEN) 100         UNIT/ML injection,     I spent > 25 minutes and more than 1/2 of the time was in counselling and coordination of care regarding above mentioned issues.

## 2018-03-16 ENCOUNTER — TELEPHONE (OUTPATIENT)
Dept: EDUCATION SERVICES | Facility: CLINIC | Age: 43
End: 2018-03-16

## 2018-03-19 ENCOUNTER — TRANSFERRED RECORDS (OUTPATIENT)
Dept: HEALTH INFORMATION MANAGEMENT | Facility: CLINIC | Age: 43
End: 2018-03-19

## 2018-03-19 ENCOUNTER — MYC MEDICAL ADVICE (OUTPATIENT)
Dept: FAMILY MEDICINE | Facility: CLINIC | Age: 43
End: 2018-03-19

## 2018-03-19 NOTE — TELEPHONE ENCOUNTER
Team Coordinators/MA-Please locate optometry report and make a copy to Dr. Talley.  Please inform patient when completed.    Thank you!  RITA ValienteN, RN

## 2018-03-20 ENCOUNTER — OFFICE VISIT (OUTPATIENT)
Dept: FAMILY MEDICINE | Facility: CLINIC | Age: 43
End: 2018-03-20
Payer: COMMERCIAL

## 2018-03-20 VITALS
RESPIRATION RATE: 16 BRPM | DIASTOLIC BLOOD PRESSURE: 78 MMHG | HEART RATE: 64 BPM | SYSTOLIC BLOOD PRESSURE: 110 MMHG | BODY MASS INDEX: 27.69 KG/M2 | WEIGHT: 193 LBS | OXYGEN SATURATION: 99 % | TEMPERATURE: 97.6 F

## 2018-03-20 PROCEDURE — 99212 OFFICE O/P EST SF 10 MIN: CPT | Performed by: FAMILY MEDICINE

## 2018-03-20 NOTE — MR AVS SNAPSHOT
After Visit Summary   3/20/2018    Jake Brothers    MRN: [id number]           Patient Information     Date Of Birth          1975        Visit Information        Provider Department      3/20/2018 10:00 AM Abdoulaye Talley MD [facility]        Today's Diagnoses     Uncontrolled type 1 diabetes mellitus without complication (H)    -  1       Follow-ups after your visit        Your next 10 appointments already scheduled     Mar 22, 2018  3:30 PM CDT   (Arrive by 3:15 PM)   Office Visit with Kristin Stockton RN   [facility] Diabetes ([facility])    [address] [place] [address]  3rd [facility] [id number] [phone]           Bring a current list of meds and any records pertaining to this visit. For Physicals, please bring immunization records and any forms needing to be filled out. Please arrive 10 minutes early to complete paperwork.            Mar 28, 2018 12:00 PM CDT   (Arrive by 11:40 AM)   NEW DIABETES with Renata Mcfadden MD   [facility] Endocrinology ([facility])    [address] [place] [address]  3rd [facility] [id number] [phone]            Apr 02, 2018  8:00 AM CDT   SHORT with Caden Mitchell MD   [facility] ([facility])    [address] [place] [id number] [phone]            Apr 24, 2018  9:00 AM CDT   Lab with  LAB   [facility] Lab ([facility])    [address] [place] [id number] [phone]            Apr 24, 2018 10:00 AM CDT   (Arrive by 9:45 AM)   Return General Liver with Michael Becerra MD   [facility] Hepatology ([facility])    [address] [place] [id number] [phone]              Who to contact     If you have questions or need follow up information about today's clinic visit or your schedule please contact [facility]  directly at [phone].  Normal or non-critical lab and imaging results will be communicated to you by MyChart, letter or phone within 4 business days after the clinic has received the results. If you do not hear from us within 7 days, please contact the clinic through [employer] or phone. If you have a critical or abnormal lab result, we will notify you by phone as soon as possible.  Submit refill requests through [employer] or call your pharmacy and they will forward the refill request to us. Please allow 3 business days for your refill to be completed.          Additional Information About Your Visit        [employer] Information     [employer] gives you secure access to your electronic health record. If you see a primary care provider, you can also send messages to your care team and make appointments. If you have questions, please call your primary care clinic.  If you do not have a primary care provider, please call [phone] and they will assist you.        Care EveryWhere ID     This is your Care EveryWhere ID. This could be used by other organizations to access your [facility] medical records  [id number]        Your Vitals Were     Pulse Temperature Respirations Pulse Oximetry BMI (Body Mass Index)       64 97.6  F (36.4  C) (Oral) 16 99% 27.69 kg/m2        Blood Pressure from Last 3 Encounters:   03/20/18 110/78   03/14/18 119/85   03/05/18 121/85    Weight from Last 3 Encounters:   03/20/18 193 lb (87.5 kg)   03/14/18 183 lb 8 oz (83.2 kg)   03/05/18 174 lb (78.9 kg)              Today, you had the following     No orders found for display       Primary Care Provider Office Phone # Fax #    Caden Ilya Mitchell [phone] [address] [place] [address]        Equal Access to Services     MARZENA BROCK : christian Hatfield, christen lopez. So [facility] [phone].    ATENCIÓN: shaq Pink  a bender disposición servicios gratuitos de asistencia lingüística. Sangita luong [phone].    We comply with applicable federal civil rights laws and [place] laws. We do not discriminate on the basis of race, color, national origin, age, disability, sex, sexual orientation, or gender identity.            Thank you!     Thank you for choosing [facility]  for your care. Our goal is always to provide you with excellent care. Hearing back from our patients is one way we can continue to improve our services. Please take a few minutes to complete the written survey that you may receive in the mail after your visit with us. Thank you!             Your Updated Medication List - Protect others around you: Learn how to safely use, store and throw away your medicines at www.disposemymeds.org.          This list is accurate as of 3/20/18  1:13 PM.  Always use your most recent med list.                   Brand Name Dispense Instructions for use Diagnosis    albuterol 108 (90 BASE) MCG/ACT Inhaler    PROAIR HFA/PROVENTIL HFA/VENTOLIN HFA    1 Inhaler    Inhale 2 puffs into the lungs every 6 hours as needed for shortness of breath / dyspnea or wheezing    Cough       aspirin 81 MG tablet     30 tablet    Take 1 tablet (81 mg) by mouth daily        atorvastatin 20 MG tablet    LIPITOR    90 tablet    Take 1 tablet (20 mg) by mouth daily    Uncontrolled type 1 diabetes mellitus without complication (H)       blood glucose lancets standard    no brand specified    200 each    Use to test blood sugar 2 times daily or as directed.    Uncontrolled type 2 diabetes mellitus with insulin therapy (H)       blood glucose monitoring meter device kit    no brand specified    1 kit    Use to test blood sugar 2 times daily or as directed.    Uncontrolled type 2 diabetes mellitus with insulin therapy (H)       blood glucose monitoring test strip    ONETOUCH VERIO IQ    150 strip    Use to test blood sugars 5 times daily or as  directed.    Uncontrolled type 1 diabetes mellitus without complication (H)       insulin aspart 100 UNIT/ML injection    NovoLOG FLEXPEN    15 mL    5 units before breakfast, 5 units before lunch, 5 units before dinner    Uncontrolled type 1 diabetes mellitus without complication (H)       insulin glargine 100 UNIT/ML injection    LANTUS SOLOSTAR    15 mL    Inject 24 Units Subcutaneous At Bedtime    Uncontrolled type 2 diabetes mellitus with insulin therapy (H)       insulin pen needle 31G X 6 MM     100 each    Use 1 daily or as directed.    Uncontrolled type 2 diabetes mellitus with insulin therapy (H)       IRON SUPPLEMENT PO      Take 2 tablets by mouth 2 times daily (with meals)

## 2018-03-20 NOTE — PROGRESS NOTES
SUBJECTIVE:   Jake Brothers is a 43 year old male who presents to clinic today for the following health issues:    Patient is requesting FMLA forms be filled out for employer.    Recent diagnosis of diabetes mellitus I - he still has blurry vision, polyuria, polydipsia and unsteady when he first gets up. No recent falls.     He has an Endocrinology appt on 03/28/2018.    Pt is a nurse at .    Problem list and histories reviewed & adjusted, as indicated.  Additional history: as documented    Labs reviewed in EPIC    Reviewed and updated as needed this visit by clinical staff       Reviewed and updated as needed this visit by Provider         ROS:  Constitutional, HEENT, cardiovascular, pulmonary, gi and gu systems are negative, except as otherwise noted.    OBJECTIVE:     /78  Pulse 64  Temp 97.6  F (36.4  C) (Oral)  Resp 16  Wt 193 lb (87.5 kg)  SpO2 99%  BMI 27.69 kg/m2  Body mass index is 27.69 kg/(m^2).  GENERAL: healthy, alert and no distress  EYES: Eyes grossly normal to inspection  HENT: nose and mouth without ulcers or lesions    Diagnostic Test Results:  none     ASSESSMENT/PLAN:     1. Uncontrolled type 1 diabetes mellitus without complication (H)  - intermittent leave forms filled for pt and scanned into chart     Abdoulaye Talley MD  [facility]

## 2018-03-21 ENCOUNTER — TELEPHONE (OUTPATIENT)
Dept: EDUCATION SERVICES | Facility: CLINIC | Age: 43
End: 2018-03-21

## 2018-03-21 NOTE — TELEPHONE ENCOUNTER
Tried to locate report and cannot find it. Must have went to [employer] and not been scanned yet. Will check a few days.     Danyelle Crisostomo, CMA

## 2018-03-22 ENCOUNTER — OFFICE VISIT (OUTPATIENT)
Dept: EDUCATION SERVICES | Facility: CLINIC | Age: 43
End: 2018-03-22
Payer: COMMERCIAL

## 2018-03-22 NOTE — PATIENT INSTRUCTIONS
1.  Test you blood sugar 4-10 times a day.  You are doing a good job with that.  Your blood sugar goals are:  Before meals:  mg/dl  2 hours after a meal: < 180    2.  Cut your Lantus to 23 units daily.    3.  Increase your Novolog to ICR 1 unit per 10 grams plus the sliding scale    150-200= 1 unit  201-250= 2 units  251-300= 3 units  301-350 = 4 units  351-400 = 5 units  401-450 = 6 units  451-500 = 7 units    4.  Return to see Dr. Mcfadden next week    Kristin Stockton [place] [address]  Phone: [phone]  [email or web address]

## 2018-03-22 NOTE — PROGRESS NOTES
DIABETES SELF MANAGEMENT EDUCATION: Initial Assessment Visit for Newly Diagnosed Patients (Complete AADE Goals Flowsheet)    Jake Brothers presents today for education related to Type 1 diabetes.    She is accompanied by self  Referring Provider: Caden Mitchell MD    DIABETES RELATED CONCERNS/COMMENTS:   I feel like I know too much, works in [email or web address] and is worried about infection  Patient's emotional response to diabetes: expresses readiness to learn and concern for health and well-being    Past Diabetes Education: Yes  Support system: wife, Dipti  Living Situation: lives with spouse/significant other and children  Occupation: RN med surg      ASSESSMENT:    He is currently using 27 units of Lantus and 12 units of Novolog per day on average.  The Novolog is delivered with a 1 to 10 gram ICR.  No correction has been added.  To add correction we would need to back off on his basal insulin.      He will go to 23 units of Lantus around 11 pm     He will add a correction factor of 1 to 50 mg/dl to be used with ICR before meals.      He will not cover snacks with insulin right now.  Will have him do that if he is high.    Mornin-248 after: 125-264  Lunch:  after:  Dinner: 121-267 after:  hs: 179-316    Patient Problem List and Medication List reviewed for relevant medical history, current medical status, and diabetes risk factors.    Jake said he has been close to 300 pounds in the past and several years ago he started biking and walking daily and eating better and had a large weight loss that he has been able to maintain.  He wants to make sure he wants to maintain a healthy body weight.    Current Diabetes Management per Patient:  Diabetes medications: YES, Injectable Medications: Lantus Insulin 27 units at 11pm and NovoLog Insulin 1 unit per 10 grams, Problems taking diabetes medications regularly? No , Side effects? Yes, a few lows   At risk for hypoglycemia? Yes , Symptoms: Shaky, Sweating  and Dizzy/Lightheaded and Frequency: a couple of times  BG meter: One Touch Verio meter  Patient glucose self monitoring as follows: 7-12 times daily.   BG results: see download  BG values are: Not in goal  Patient's most recent A1C >15.0%   3/14/18 is not meeting goal of <7.0         Nutrition:  Patient eats 3 meals per day, snacks frequently throughout the day, counts carbohydrates in grams and see nutritional assessment by Azul Michael, RD-CDE    Physical Activity:     walks 30 minutes with dog, rides bike to work both ways    Diabetes Complications:  Acute Complications: Symptoms of hyperglycemia? increased thirst, frequent urination, headache, blurred vision and feeling drowsy/tired    Diabetes knowledge and skills assessment:     Patient is knowledgeable in diabetes management concepts related to: Healthy Eating  Barriers to Learning Assessment: No Barriers identified    Based on learning assessment above, most appropriate setting for further diabetes education would be: Individual setting.    Education provided today on:  AADE Self-Care Behaviors:  Topics that were covered in today's visit:    Basic pathophysiology of T1DM, relationship between carbohydrate intake, release of glucose from the liver, exercise and weight management on glucose control.    Target blood glucose for pre-meal and 2 hour post-prandial glucose    Action, timing and potential side-effects of diabetes medications: Novolog, Lantus    Concepts of basal/bolus insulin, ICR and correction    Basic meal planning using the plate method, emphasizing portion control, healthy food choices and eliminating or minimizing sugared beverages.    Need for consistent physical activity, 30-45 minutes duration, preferably 4-6 days per week.       {Education Materials Provided:  JDRF Type 1 Toolkit    PLAN:  Patient needs further education on the following diabetes management concepts: Healthy Eating, Being Active, Monitoring, Taking Medication, Problem  Solving, Reducing Risks and Healthy Coping    See Patient Instructions for co-developed, patient-stated behavior change goals.  AVS printed and provided to patient today.    FOLLOW-UP:  Follow-up appointment scheduled on   Chart routed to referring provider.    Time Spent: 60 minutes  Encounter Type: Individual    Any diabetes medication dose changes were made via the CDE Protocol and Collaborative Practice Agreement with  Physicians.

## 2018-03-22 NOTE — MR AVS SNAPSHOT
After Visit Summary   3/22/2018    Jake Brothers    MRN: [id number]           Patient Information     Date Of Birth          1975        Visit Information        Provider Department      3/22/2018 3:30 PM Kristin Stockton RN [facility] Diabetes        Care Instructions    1.  Test you blood sugar 4-10 times a day.  You are doing a good job with that.  Your blood sugar goals are:  Before meals:  mg/dl  2 hours after a meal: < 180    2.  Cut your Lantus to 23 units daily.    3.  Increase your Novolog to ICR 1 unit per 10 grams plus the sliding scale    150-200= 1 unit  201-250= 2 units  251-300= 3 units  301-350 = 4 units  351-400 = 5 units  401-450 = 6 units  451-500 = 7 units    4.  Return to see Dr. Mcfadden next week    Kristin Stock[place] [address]  Phone: [phone]  [email or web address]              Follow-ups after your visit        Your next 10 appointments already scheduled     Mar 28, 2018 12:00 PM CDT   (Arrive by 11:40 AM)   NEW DIABETES with Renata Mcfadden MD   [facility] Endocrinology ([facility])    [address] [place] [id number] [phone]            Apr 02, 2018  8:00 AM CDT   SHORT with Caden Mitchell MD   [facility]    [address] [place] [id number] [phone]            Apr 24, 2018  9:00 AM CDT   Lab with  LAB   [facility] Lab ([facility])    [address] [place] [id number] [phone]            Apr 24, 2018 10:00 AM CDT   (Arrive by 9:45 AM)   Return General Liver with Michael Becerra MD   [facility] Hepatology ([facility])    [address] [place] [id number] [phone]              Who to contact     Please call your clinic at [phone] to:    Ask questions about your  health    Make or cancel appointments    Discuss your medicines    Learn about your test results    Speak to your doctor            Additional Information About Your Visit        [email or web address] Information     [employer] gives you secure access to your electronic health record. If you see a primary care provider, you can also send messages to your care team and make appointments. If you have questions, please call your primary care clinic.  If you do not have a primary care provider, please call [phone] and they will assist you.      [employer] is an electronic gateway that provides easy, online access to your medical records. With [employer], you can request a clinic appointment, read your test results, renew a prescription or communicate with your care team.     To access your existing account, please contact your [facility] or call [phone] for assistance.        Care EveryWhere ID     This is your Care EveryWhere ID. This could be used by other organizations to access your [place] medical records  [id number]         Blood Pressure from Last 3 Encounters:   03/20/18 110/78   03/14/18 119/85   03/05/18 121/85    Weight from Last 3 Encounters:   03/20/18 87.5 kg (193 lb)   03/14/18 83.2 kg (183 lb 8 oz)   03/05/18 78.9 kg (174 lb)              Today, you had the following     No orders found for display       Primary Care Provider Office Phone # Fax #    Caden Ilya Mitchell [phone]       [facility] [address] [place] [address]        Equal Access to Services     MARZENA BROCK : Hadii anjelica Qureshi, wakendra cooley, qaybta kaalmada jimmie, christen rodriguez. So [facility] [phone].    ATENCIÓN: Si habla español, tiene a bender disposición servicios gratuitos de asistencia lingüística. Llame al [phone].    We comply with applicable federal civil rights laws and [place] laws. We do not discriminate on the basis of race, color,  national origin, age, disability, sex, sexual orientation, or gender identity.            Thank you!     Thank you for choosing [facility] DIABETES  for your care. Our goal is always to provide you with excellent care. Hearing back from our patients is one way we can continue to improve our services. Please take a few minutes to complete the written survey that you may receive in the mail after your visit with us. Thank you!             Your Updated Medication List - Protect others around you: Learn how to safely use, store and throw away your medicines at www.disposemymeds.org.          This list is accurate as of 3/22/18  4:22 PM.  Always use your most recent med list.                   Brand Name Dispense Instructions for use Diagnosis    albuterol 108 (90 BASE) MCG/ACT Inhaler    PROAIR HFA/PROVENTIL HFA/VENTOLIN HFA    1 Inhaler    Inhale 2 puffs into the lungs every 6 hours as needed for shortness of breath / dyspnea or wheezing    Cough       aspirin 81 MG tablet     30 tablet    Take 1 tablet (81 mg) by mouth daily        atorvastatin 20 MG tablet    LIPITOR    90 tablet    Take 1 tablet (20 mg) by mouth daily    Uncontrolled type 1 diabetes mellitus without complication (H)       blood glucose lancets standard    no brand specified    200 each    Use to test blood sugar 6 times daily or as directed.    Uncontrolled type 2 diabetes mellitus with insulin therapy (H)       blood glucose monitoring meter device kit    no brand specified    1 kit    Use to test blood sugar 2 times daily or as directed.    Uncontrolled type 2 diabetes mellitus with insulin therapy (H)       blood glucose monitoring test strip    ONETOUCH VERIO IQ    150 strip    Use to test blood sugars 5 times daily or as directed.    Uncontrolled type 1 diabetes mellitus without complication (H)       insulin aspart 100 UNIT/ML injection    NovoLOG FLEXPEN    15 mL    5 units before breakfast, 5 units before lunch, 5 units before dinner     Uncontrolled type 1 diabetes mellitus without complication (H)       insulin glargine 100 UNIT/ML injection    LANTUS SOLOSTAR    15 mL    Inject 24 Units Subcutaneous At Bedtime    Uncontrolled type 2 diabetes mellitus with insulin therapy (H)       insulin pen needle 31G X 6 MM     100 each    Use 1 daily or as directed.    Uncontrolled type 2 diabetes mellitus with insulin therapy (H)       IRON SUPPLEMENT PO      Take 2 tablets by mouth 2 times daily (with meals)

## 2018-03-26 NOTE — TELEPHONE ENCOUNTER
Called to follow up with pt, unable to find report in archived. Notify pt that he can call his lens craft to send this report to us again if it's something urgent. Pt state is not urgent but took down our fax # again.     Okay with closing ELINA Kaiser MA

## 2018-03-27 ENCOUNTER — TELEPHONE (OUTPATIENT)
Dept: FAMILY MEDICINE | Facility: CLINIC | Age: 43
End: 2018-03-27

## 2018-03-27 ENCOUNTER — MYC MEDICAL ADVICE (OUTPATIENT)
Dept: FAMILY MEDICINE | Facility: CLINIC | Age: 43
End: 2018-03-27

## 2018-03-27 NOTE — TELEPHONE ENCOUNTER
[place] Leave of Absence states section B of the [facility] paperwork they received for the patient selects 'supporting intermittent leave' however the patient has been out of work since 3/9/2018. They are requesting sections B and C be updated to support a start date of 3/9/2018 if provider supports that. Please assist. Thanks!

## 2018-03-27 NOTE — TELEPHONE ENCOUNTER
Patient saw Dr Talley 3/20/18 during Dr Mitchell's absence.  FMLA forms were filled out for employer.  Asking for sections B and C to be completed (continuous leave rather than intermittent leave).    Does patient need to be seen again?  Can forms be updated?  I am not seeing a scanned copy of the forms in the chart yet.  Christin Rodriguez RN

## 2018-03-28 ENCOUNTER — OFFICE VISIT (OUTPATIENT)
Dept: ENDOCRINOLOGY | Facility: CLINIC | Age: 43
End: 2018-03-28
Payer: COMMERCIAL

## 2018-03-28 VITALS — HEART RATE: 73 BPM | DIASTOLIC BLOOD PRESSURE: 71 MMHG | SYSTOLIC BLOOD PRESSURE: 146 MMHG

## 2018-03-28 NOTE — LETTER
3/28/2018       RE: Jake Brothers  [address] [place] [id number]     Dear Colleague,    Thank you for referring your patient, Jake Brothers, to the [facility] ENDOCRINOLOGY at [facility]. Please see a copy of my visit note below.    Endocrinology Clinic Visit      March 28, 2018        Chief Complaint: Consult (DIABETES TYPE 1 CONSULTATION )     Referred by: Caden Mitchell    Subjective:         HPI: Jake Brothers is a 43 year old year old male who is here for a clinic visit.   he has a history of celiac disease, vitiligo, h/o obesity now s/p significant weight loss.     History of Diabetes  Type 1, recently diagnosed. Has history of other autoimmune conditions including celiac disease and vitiligo. He presented to his PCP with symptoms of polyuria. He had wt loss and blurry vision as well.     DM timeline  3/5/18 seen by PCP office for polyuria, A1c >15, new diagnosis, started lantus 15u daily, plan to increase dose by 3u q3-4 days to reach fasting BG goal. Labs for type 1 done and SHANTEL +.  3/13/18 seen by CDE Azul Michael for basic education, up to lantus 24u  3/14/18 continued lantus, started novolog fixed dose 5u, prescribed atorvastatin  3/22/18 saw MALCAHI Stockton, decreased lantus 27--> 23, changed novolog to ICR 1:10g CHO, started correction scale 1:50>150    Current regimen:  lantus 23u qhs  novolog 1u:10g CHO, not taking with small snacks  novolog correction 1:50>150, but only really starts if BG is >160    Overall he is doing well, adjusting to all the new lifestyle and medical changes. He is not currently working since the diagnosis given the high intensity of clinic follow up, and thus has been less physically active. He is concerned about gaining weight that he worked so hard to lose in the past.     In terms of his blood sugars and insulin admin, he is mostly not needing correction during the day, but often needs it in the morning as  his fasting blood sugars tend to be higher.  He is having a few lows, typically post prandially and most often after breakfast. He reports feeling lows, with symptoms of shakiness, sweatiness and disorientation. He feels off when his BG is <110, and definitely feels all the symptoms if his BG is in the 70s or below.  He tends to walk his dog in the morning and so he cannot tell if his low blood sugars post breakfast are due to walking or due to too much novolog. BG through the rest of the day are in quite good control, with only rare hyper or hypoglycemia.        Current Treatment  Diabetes Medication(s)     Diabetic Other Sig    glucagon (GLUCAGON EMERGENCY) 1 MG kit Inject 1 mg into the muscle once for 1 dose    Insulin Sig    insulin glargine (LANTUS SOLOSTAR) 100 UNIT/ML injection Inject 25 Units Subcutaneous At Bedtime Go up by 2 units weekly until fasting blood sugars are .    insulin aspart (NOVOLOG FLEXPEN) 100 UNIT/ML injection 1 unit per 12 grams of carbohydrates + correction    insulin aspart (NOVOLOG FLEXPEN) 100 UNIT/ML injection 1 unit per 12 grams of carbohydrates + correction with meals  TID  approx  25 units daily Max    insulin aspart (NOVOLOG FLEXPEN) 100 UNIT/ML injection 5 units before breakfast, 5 units before lunch, 5 units before dinner    insulin glargine (LANTUS SOLOSTAR) 100 UNIT/ML injection Inject 24 Units Subcutaneous At Bedtime     Patient taking differently:  Inject 23 Units Subcutaneous At Bedtime             Meter Download Summary:   before breakfast: 181 +/-  45  before lunch:  137 +/- 46  before dinner: 197 +/- 81  bedtime:  176 +/- 53  2 months average: +/-   14 days average 163 +/- 61  2.2% BG values below 60    Diet:   Breakfast: oatmeal with syrup (no other proteins)  Other meals are overall low carb, but have fats and proteins mixed  Snacks occasionally in afternoon, 30g protein bars    Exercise   Dog walk 1/2 hour  Casual bike ride   Used to do more biking to and from  work, some mountain/trail biking    Weight trend  Wt Readings from Last 4 Encounters:   03/28/18 (P) 87.9 kg (193 lb 12.8 oz)   03/20/18 87.5 kg (193 lb)   03/14/18 83.2 kg (183 lb 8 oz)   03/05/18 78.9 kg (174 lb)       Complications    no chronic complications.     1. Retinopathy: unclear, likely not given recent diagnosis      2.  Nephropathy: no  Albumin Urine mg/g Cr   Date Value Ref Range Status   03/05/2018 19.72 (H) 0 - 17 mg/g Cr Final       3. Neuropathy   Last monofilament testing: denies    4. Hypoglycemia: yes, symptomatic at <110    5. Macrovascular: none    6. Lipids: usually well controlled, most recent panel with all components elevated, particularly TG in setting of severe hyperglycemia  He was prescribed atrova but not taking it as he wants to try lifestyle changes as well as get better control of BGs      A1c today is   Lab Results   Component Value Date    A1C >15.0 03/14/2018    A1C >15.0 03/05/2018          No Known Allergies    Current Outpatient Prescriptions   Medication Sig Dispense Refill     Multiple Vitamins-Minerals (MULTIVITAMIN ADULT PO)        insulin glargine (LANTUS SOLOSTAR) 100 UNIT/ML injection Inject 25 Units Subcutaneous At Bedtime Go up by 2 units weekly until fasting blood sugars are . 15 mL 11     insulin pen needle 31G X 6 MM Use up to 5 daily or as directed. 150 each 11     glucagon (GLUCAGON EMERGENCY) 1 MG kit Inject 1 mg into the muscle once for 1 dose 1 mg 1     [DISCONTINUED] insulin aspart (NOVOLOG FLEXPEN) 100 UNIT/ML injection 1 unit per 12 grams of carbohydrates + correction 15 mL 11     blood glucose (NO BRAND SPECIFIED) lancets standard Use to test blood sugar 6 times daily or as directed. 200 each 6     blood glucose monitoring (ONETOUCH VERIO IQ) test strip Use to test blood sugars 5 times daily or as directed. 150 strip 6     aspirin 81 MG tablet Take 1 tablet (81 mg) by mouth daily 30 tablet      blood glucose monitoring (NO BRAND SPECIFIED) meter  device kit Use to test blood sugar 2 times daily or as directed. 1 kit 0     Ferrous Sulfate (IRON SUPPLEMENT PO) Take 2 tablets by mouth daily        albuterol (PROAIR HFA, PROVENTIL HFA, VENTOLIN HFA) 108 (90 BASE) MCG/ACT inhaler Inhale 2 puffs into the lungs every 6 hours as needed for shortness of breath / dyspnea or wheezing 1 Inhaler 0     insulin aspart (NOVOLOG FLEXPEN) 100 UNIT/ML injection 1 unit per 12 grams of carbohydrates + correction with meals  TID  approx  25 units daily Max 15 mL 11     insulin pen needle (B-D U/F) 31G X 5 MM Use 5  time(s) per day. 150 each 11     atorvastatin (LIPITOR) 20 MG tablet Take 1 tablet (20 mg) by mouth daily (Patient not taking: Reported on 3/28/2018) 90 tablet 3     [DISCONTINUED] insulin aspart (NOVOLOG FLEXPEN) 100 UNIT/ML injection 5 units before breakfast, 5 units before lunch, 5 units before dinner 15 mL 1     [DISCONTINUED] insulin glargine (LANTUS SOLOSTAR) 100 UNIT/ML injection Inject 24 Units Subcutaneous At Bedtime (Patient taking differently: Inject 23 Units Subcutaneous At Bedtime ) 15 mL 0       Review of Systems     Constitutional: Negative for fever and unexpected weight change. Appetite is high, but normalizing slowly   Head: no headache   Eye: ongoing vision changes  ENT: No throat congestion.   Respiratory: no cough   Cardiovascular: no chest pain or tachycardia  Gastrointestinal: Negative for vomiting, abdominal pain and diarrhea.  Genitourinary: No bladder problems.  Musculoskeletal: Negative for myalgias. No weakness.  Neurological: Negative for seizures and headaches.  Psychiatric/Behavioral: Negative for behavioral problem and dysphoric mood.    Past Medical History:   Diagnosis Date     Bronchitis, chronic, simple (H)      Obesity        Past Surgical History:   Procedure Laterality Date     C ORAL SURGERY PROCEDURE  1994    [place] Teeth Extraction       Family History   Problem Relation Age of Onset     Hypertension Father      Alcohol/Drug  "Father      Depression Father      HEART DISEASE Father      Depression Mother      DIABETES Paternal Uncle      DIABETES Paternal Uncle      Allergies Sister      pcn     CANCER No family hx of      No family history of skin cancer       Social History     Social History     Marital status:      Spouse name: N/A     Number of children: 1     Years of education: N/A     Occupational History     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 No     Caffeine Concern Not Asked     2 cups of coffee in am     Exercise Not Asked     Rides his bike everywhere 3 to 4 times a week     Social History Narrative    ** Merged History Encounter **         Balanced Diet - Yes    Osteoporosis Preventative measures-  Pt does not eat diary    Regular Exercise -  Yes Describe bike every where he goes    Dental Exam up - NO    Eye Exam - YES - Date: yearly    Self Testicular Exam -  No    Do you have any concerns about STD's -  No    Abuse: Current or Past (Physical, Sexual or Emotional)- No    Do you feel safe in your environment - Yes    Guns stored in the home - No    Sunscreen used - No    Seatbelts used - Yes    Lipids - NO    Glucose -  NO    Colon Cancer Screening - No    Hemoccults - NO    PSA - NO    Digital Rectal Exam - NO    Immunizations reviewed and up to date - Yes.Meagan Soliz MA    Pt had a tetanus shot in the past three years. But he is not sure.               Objective:   /71  Pulse 73  Ht (P) 1.765 m (5' 9.5\")  Wt (P) 87.9 kg (193 lb 12.8 oz)  BMI (P) 28.21 kg/m2    General: well developed, well nourished, in NAD  Eyes: anicteric, normal extra-occular " movements, no lid lag, no stare  HEENT: MMM, oropharynx clear, no LAD, no thyrmegaly   CV: RRR, normal S1/S2, no MRG  Pulm: CTAB, no wheezes or crackles  Abd: soft, non tender, non distended, NABS, no HSM  Neuro: alert, CNII-XII intact, strength 5/5 in all 4 extremities, sensation intact to light touch  Extremities: no LE edema  Skin: No lesions noted, normal texture  Feet:2+ DP pulses, no wounds/lesions, monofilament intact bilaterally.       In House Labs:   Lab Results   Component Value Date    A1C >15.0 03/14/2018    A1C >15.0 03/05/2018       TSH   Date Value Ref Range Status   03/05/2018 1.19 0.40 - 4.00 mU/L Final       Creatinine   Date Value Ref Range Status   03/05/2018 0.62 (L) 0.66 - 1.25 mg/dL Final   ]    Recent Labs   Lab Test  03/05/18   0809  08/16/16   0938  09/25/14   0856   CHOL  202*  89  117   HDL  25*  24*  26*   LDL  108*  52  70   TRIG  345*  63  103   CHOLHDLRATIO   --    --   4.5       No results found for: [id number], [id number], [id number]      Assessment/Treatment Plan:      Jake Brothers is a 43 year old year old male with    1. Type 1 Diabetes:    A1c >15 3/5/18 at time of diagnosis. SHANTEL +. He is here to establish care in the endocrinology department.  There is no evidence of chronic diabetic complications.  He is having mostly fasting hyperglycemia, some post prandially hypoglycemia, and starting to trend up by the end of the day. He is also hoping to start increasing his physical activity as he does not want to gain weight that he worked hard to lose, though he will need to be careful as he does have some post prandial and post activity lows.     -increase lantus 23-->25u qhs. Increase by 2 units every week until goal fasting BG  on most days of the week  -decrease ICR to 1u:12g CHO.  -continue correction 1u:50mg/dl >150.   -recommend holding mealtime insulin prior to planned exercise. If mealtime BGs are high prior to next meal, then recommend giving 1/2 of carb based dose  with the meal prior to exercise. Will continue to work to find an appropriate regimen to allow for more physical activity  -pt has follow up with Kristin Stockton in ~1 week, pt requested to discuss CGM  -continue BG monitoring  -call/contact us if having frequent hypoglycemia.   -pt given Rx for glucagon kit, will need teaching by pharmacy or CDE at next visit  -foot exam due in 1 year (3/28/19)    2. Autoimmune predilection- patient with T1DM, celiac disease and vitiligo. He is at higher risk for developing other autoimmune conditions such as autoimmune thyroid disease or adrenal insufficiency. Patient given symptoms to watch for and to let us or PCP know so testing can be performed if needed.   -continue gluten free diet  -TSH normal 3/5/18. Should be screened annually or checked with new concerning symptoms.   -hypotension, nausea, vomiting, abdominal pain, diarrhea, LH, lethargy are signs of adrenal insufficiency. No lab screening necessary but providers should be aware he is at higher risk.    Return to clinic in 3 months.    Marguerite Bassett MD  Endocrinology Fellow, PGY4  [phone]    Patient was seen and discussed with attending Dr. Olguin.     Attending addendum:  Pt was seen & evaluated with endocrinology fellow & plan is as outlined in this note.  EDGARDO OLGUIN MD, MPH  Endocrinologist    Test and/or medications prescribed today:  No orders of the defined types were placed in this encounter.        Patient Instructions   -increase lantus to 25u QHS. Goal fasting blood sugars . If your fasting blood sugars are above that range for most days of the week, increase your dose by 2u each week until you are withinin goal most days per week.     -decrease your carb ratio to novolog 1u:12g Carbs.     -continue your current correction scale, novolog 1u:50mg/dl above 150.     -in terms of exercise, we will need to try out what regimen works best for you. If you want to step up your exercise, I would  recommend holding your mealtime insulin before you exercise, and then checking blood sugars 2 hours after your meal.  Correct for your blood sugar at your next meal if it is high.  If the Blood sugar before your next meal is consistently high, you may want to use a 1/2 dose of insulin (based on carb ratio) prior to work outs.     -please let us or Kristin Stockton if you are having frequent low blood sugars so we can try adjusting your insulin regimen.             Again, thank you for allowing me to participate in the care of your patient.      Sincerely,    Aljeandra Olguin MD

## 2018-03-28 NOTE — TELEPHONE ENCOUNTER
Called [facility] Leave of Absence. They will fax a copy of the [place] paperwork to the clinic. This copy that needs part B and C updated.  SHERYL Vaughn

## 2018-03-28 NOTE — PROGRESS NOTES
Endocrinology Clinic Visit      March 28, 2018        Chief Complaint: Consult (DIABETES TYPE 1 CONSULTATION )     Referred by: Caden Mitchell    Subjective:         HPI: Jake Brothers is a 43 year old year old male who is here for a clinic visit.   he has a history of celiac disease, vitiligo, h/o obesity now s/p significant weight loss.     History of Diabetes  Type 1, recently diagnosed. Has history of other autoimmune conditions including celiac disease and vitiligo. He presented to his PCP with symptoms of polyuria. He had wt loss and blurry vision as well.     DM timeline  3/5/18 seen by PCP office for polyuria, A1c >15, new diagnosis, started lantus 15u daily, plan to increase dose by 3u q3-4 days to reach fasting BG goal. Labs for type 1 done and SHANTEL +.  3/13/18 seen by JONAHE Azul Michael for basic education, up to lantus 24u  3/14/18 continued lantus, started novolog fixed dose 5u, prescribed atorvastatin  3/22/18 saw MALACHI Stockton, decreased lantus 27--> 23, changed novolog to ICR 1:10g CHO, started correction scale 1:50>150    Current regimen:  lantus 23u qhs  novolog 1u:10g CHO, not taking with small snacks  novolog correction 1:50>150, but only really starts if BG is >160    Overall he is doing well, adjusting to all the new lifestyle and medical changes. He is not currently working since the diagnosis given the high intensity of clinic follow up, and thus has been less physically active. He is concerned about gaining weight that he worked so hard to lose in the past.     In terms of his blood sugars and insulin admin, he is mostly not needing correction during the day, but often needs it in the morning as his fasting blood sugars tend to be higher.  He is having a few lows, typically post prandially and most often after breakfast. He reports feeling lows, with symptoms of shakiness, sweatiness and disorientation. He feels off when his BG is <110, and definitely feels all the symptoms if his  BG is in the 70s or below.  He tends to walk his dog in the morning and so he cannot tell if his low blood sugars post breakfast are due to walking or due to too much novolog. BG through the rest of the day are in quite good control, with only rare hyper or hypoglycemia.        Current Treatment  Diabetes Medication(s)     Diabetic Other Sig    glucagon (GLUCAGON EMERGENCY) 1 MG kit Inject 1 mg into the muscle once for 1 dose    Insulin Sig    insulin glargine (LANTUS SOLOSTAR) 100 UNIT/ML injection Inject 25 Units Subcutaneous At Bedtime Go up by 2 units weekly until fasting blood sugars are .    insulin aspart (NOVOLOG FLEXPEN) 100 UNIT/ML injection 1 unit per 12 grams of carbohydrates + correction    insulin aspart (NOVOLOG FLEXPEN) 100 UNIT/ML injection 1 unit per 12 grams of carbohydrates + correction with meals  TID  approx  25 units daily Max    insulin aspart (NOVOLOG FLEXPEN) 100 UNIT/ML injection 5 units before breakfast, 5 units before lunch, 5 units before dinner    insulin glargine (LANTUS SOLOSTAR) 100 UNIT/ML injection Inject 24 Units Subcutaneous At Bedtime     Patient taking differently:  Inject 23 Units Subcutaneous At Bedtime             Meter Download Summary:   before breakfast: 181 +/-  45  before lunch:  137 +/- 46  before dinner: 197 +/- 81  bedtime:  176 +/- 53  2 months average: +/-   14 days average 163 +/- 61  2.2% BG values below 60    Diet:   Breakfast: oatmeal with syrup (no other proteins)  Other meals are overall low carb, but have fats and proteins mixed  Snacks occasionally in afternoon, 30g protein bars    Exercise   Dog walk 1/2 hour  Casual bike ride   Used to do more biking to and from work, some mountain/trail biking    Weight trend  Wt Readings from Last 4 Encounters:   03/28/18 (P) 87.9 kg (193 lb 12.8 oz)   03/20/18 87.5 kg (193 lb)   03/14/18 83.2 kg (183 lb 8 oz)   03/05/18 78.9 kg (174 lb)       Complications    no chronic complications.     1. Retinopathy:  unclear, likely not given recent diagnosis      2.  Nephropathy: no  Albumin Urine mg/g Cr   Date Value Ref Range Status   03/05/2018 19.72 (H) 0 - 17 mg/g Cr Final       3. Neuropathy   Last monofilament testing: denies    4. Hypoglycemia: yes, symptomatic at <110    5. Macrovascular: none    6. Lipids: usually well controlled, most recent panel with all components elevated, particularly TG in setting of severe hyperglycemia  He was prescribed atrova but not taking it as he wants to try lifestyle changes as well as get better control of BGs      A1c today is   Lab Results   Component Value Date    A1C >15.0 03/14/2018    A1C >15.0 03/05/2018          No Known Allergies    Current Outpatient Prescriptions   Medication Sig Dispense Refill     Multiple Vitamins-Minerals (MULTIVITAMIN ADULT PO)        insulin glargine (LANTUS SOLOSTAR) 100 UNIT/ML injection Inject 25 Units Subcutaneous At Bedtime Go up by 2 units weekly until fasting blood sugars are . 15 mL 11     insulin pen needle 31G X 6 MM Use up to 5 daily or as directed. 150 each 11     glucagon (GLUCAGON EMERGENCY) 1 MG kit Inject 1 mg into the muscle once for 1 dose 1 mg 1     [DISCONTINUED] insulin aspart (NOVOLOG FLEXPEN) 100 UNIT/ML injection 1 unit per 12 grams of carbohydrates + correction 15 mL 11     blood glucose (NO BRAND SPECIFIED) lancets standard Use to test blood sugar 6 times daily or as directed. 200 each 6     blood glucose monitoring (ONETOUCH VERIO IQ) test strip Use to test blood sugars 5 times daily or as directed. 150 strip 6     aspirin 81 MG tablet Take 1 tablet (81 mg) by mouth daily 30 tablet      blood glucose monitoring (NO BRAND SPECIFIED) meter device kit Use to test blood sugar 2 times daily or as directed. 1 kit 0     Ferrous Sulfate (IRON SUPPLEMENT PO) Take 2 tablets by mouth daily        albuterol (PROAIR HFA, PROVENTIL HFA, VENTOLIN HFA) 108 (90 BASE) MCG/ACT inhaler Inhale 2 puffs into the lungs every 6 hours as needed  for shortness of breath / dyspnea or wheezing 1 Inhaler 0     insulin aspart (NOVOLOG FLEXPEN) 100 UNIT/ML injection 1 unit per 12 grams of carbohydrates + correction with meals  TID  approx  25 units daily Max 15 mL 11     insulin pen needle (B-D U/F) 31G X 5 MM Use 5  time(s) per day. 150 each 11     atorvastatin (LIPITOR) 20 MG tablet Take 1 tablet (20 mg) by mouth daily (Patient not taking: Reported on 3/28/2018) 90 tablet 3     [DISCONTINUED] insulin aspart (NOVOLOG FLEXPEN) 100 UNIT/ML injection 5 units before breakfast, 5 units before lunch, 5 units before dinner 15 mL 1     [DISCONTINUED] insulin glargine (LANTUS SOLOSTAR) 100 UNIT/ML injection Inject 24 Units Subcutaneous At Bedtime (Patient taking differently: Inject 23 Units Subcutaneous At Bedtime ) 15 mL 0       Review of Systems     Constitutional: Negative for fever and unexpected weight change. Appetite is high, but normalizing slowly   Head: no headache   Eye: ongoing vision changes  ENT: No throat congestion.   Respiratory: no cough   Cardiovascular: no chest pain or tachycardia  Gastrointestinal: Negative for vomiting, abdominal pain and diarrhea.  Genitourinary: No bladder problems.  Musculoskeletal: Negative for myalgias. No weakness.  Neurological: Negative for seizures and headaches.  Psychiatric/Behavioral: Negative for behavioral problem and dysphoric mood.    Past Medical History:   Diagnosis Date     Bronchitis, chronic, simple (H)      Obesity        Past Surgical History:   Procedure Laterality Date     C ORAL SURGERY PROCEDURE  1994    [place] Teeth Extraction       Family History   Problem Relation Age of Onset     Hypertension Father      Alcohol/Drug Father      Depression Father      HEART DISEASE Father      Depression Mother      DIABETES Paternal Uncle      DIABETES Paternal Uncle      Allergies Sister      pcn     CANCER No family hx of      No family history of skin cancer       Social History     Social History     Marital  "status:      Spouse name: N/A     Number of children: 1     Years of education: N/A     Occupational History     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 No     Caffeine Concern Not Asked     2 cups of coffee in am     Exercise Not Asked     Rides his bike everywhere 3 to 4 times a week     Social History Narrative    ** Merged History Encounter **         Balanced Diet - Yes    Osteoporosis Preventative measures-  Pt does not eat diary    Regular Exercise -  Yes Describe bike every where he goes    Dental Exam up - NO    Eye Exam - YES - Date: yearly    Self Testicular Exam -  No    Do you have any concerns about STD's -  No    Abuse: Current or Past (Physical, Sexual or Emotional)- No    Do you feel safe in your environment - Yes    Guns stored in the home - No    Sunscreen used - No    Seatbelts used - Yes    Lipids - NO    Glucose -  NO    Colon Cancer Screening - No    Hemoccults - NO    PSA - NO    Digital Rectal Exam - NO    Immunizations reviewed and up to date - Yes.Meagan Soliz MA    Pt had a tetanus shot in the past three years. But he is not sure.               Objective:   /71  Pulse 73  Ht (P) 1.765 m (5' 9.5\")  Wt (P) 87.9 kg (193 lb 12.8 oz)  BMI (P) 28.21 kg/m2    General: well developed, well nourished, in NAD  Eyes: anicteric, normal extra-occular movements, no lid lag, no stare  HEENT: MMM, oropharynx clear, no LAD, no thyrmegaly   CV: RRR, normal S1/S2, no MRG  Pulm: CTAB, no wheezes or crackles  Abd: soft, non tender, non distended, NABS, no HSM  Neuro: alert, CNII-XII intact, strength 5/5 in all 4 extremities, sensation " intact to light touch  Extremities: no LE edema  Skin: No lesions noted, normal texture  Feet:2+ DP pulses, no wounds/lesions, monofilament intact bilaterally.       In House Labs:   Lab Results   Component Value Date    A1C >15.0 03/14/2018    A1C >15.0 03/05/2018       TSH   Date Value Ref Range Status   03/05/2018 1.19 0.40 - 4.00 mU/L Final       Creatinine   Date Value Ref Range Status   03/05/2018 0.62 (L) 0.66 - 1.25 mg/dL Final   ]    Recent Labs   Lab Test  03/05/18   0809  08/16/16   0938  09/25/14   0856   CHOL  202*  89  117   HDL  25*  24*  26*   LDL  108*  52  70   TRIG  345*  63  103   CHOLHDLRATIO   --    --   4.5       No results found for: [id number], [id number], [id number]      Assessment/Treatment Plan:      Jake Brothers is a 43 year old year old male with    1. Type 1 Diabetes:    A1c >15 3/5/18 at time of diagnosis. SHANTEL +. He is here to establish care in the endocrinology department.  There is no evidence of chronic diabetic complications.  He is having mostly fasting hyperglycemia, some post prandially hypoglycemia, and starting to trend up by the end of the day. He is also hoping to start increasing his physical activity as he does not want to gain weight that he worked hard to lose, though he will need to be careful as he does have some post prandial and post activity lows.     -increase lantus 23-->25u qhs. Increase by 2 units every week until goal fasting BG  on most days of the week  -decrease ICR to 1u:12g CHO.  -continue correction 1u:50mg/dl >150.   -recommend holding mealtime insulin prior to planned exercise. If mealtime BGs are high prior to next meal, then recommend giving 1/2 of carb based dose with the meal prior to exercise. Will continue to work to find an appropriate regimen to allow for more physical activity  -pt has follow up with Kristin Stockton in ~1 week, pt requested to discuss CGM  -continue BG monitoring  -call/contact us if having frequent hypoglycemia.   -pt  given Rx for glucagon kit, will need teaching by pharmacy or CDE at next visit  -foot exam due in 1 year (3/28/19)    2. Autoimmune predilection- patient with T1DM, celiac disease and vitiligo. He is at higher risk for developing other autoimmune conditions such as autoimmune thyroid disease or adrenal insufficiency. Patient given symptoms to watch for and to let us or PCP know so testing can be performed if needed.   -continue gluten free diet  -TSH normal 3/5/18. Should be screened annually or checked with new concerning symptoms.   -hypotension, nausea, vomiting, abdominal pain, diarrhea, LH, lethargy are signs of adrenal insufficiency. No lab screening necessary but providers should be aware he is at higher risk.    Return to clinic in 3 months.    Marguerite Bassett MD  Endocrinology Fellow, PGY4  [phone]    Patient was seen and discussed with attending Dr. Olguin.     Attending addendum:  Pt was seen & evaluated with endocrinology fellow & plan is as outlined in this note.  EDGARDO OLGUIN MD, MPH  Endocrinologist    Test and/or medications prescribed today:  No orders of the defined types were placed in this encounter.        Patient Instructions   -increase lantus to 25u QHS. Goal fasting blood sugars . If your fasting blood sugars are above that range for most days of the week, increase your dose by 2u each week until you are withinin goal most days per week.     -decrease your carb ratio to novolog 1u:12g Carbs.     -continue your current correction scale, novolog 1u:50mg/dl above 150.     -in terms of exercise, we will need to try out what regimen works best for you. If you want to step up your exercise, I would recommend holding your mealtime insulin before you exercise, and then checking blood sugars 2 hours after your meal.  Correct for your blood sugar at your next meal if it is high.  If the Blood sugar before your next meal is consistently high, you may want to use a 1/2 dose of insulin  (based on carb ratio) prior to work outs.     -please let us or Kristin Stockton if you are having frequent low blood sugars so we can try adjusting your insulin regimen.

## 2018-03-28 NOTE — PATIENT INSTRUCTIONS
-increase lantus to 25u QHS. Goal fasting blood sugars . If your fasting blood sugars are above that range for most days of the week, increase your dose by 2u each week until you are withinin goal most days per week.     -decrease your carb ratio to novolog 1u:12g Carbs.     -continue your current correction scale, novolog 1u:50mg/dl above 150.     -in terms of exercise, we will need to try out what regimen works best for you. If you want to step up your exercise, I would recommend holding your mealtime insulin before you exercise, and then checking blood sugars 2 hours after your meal.  Correct for your blood sugar at your next meal if it is high.  If the Blood sugar before your next meal is consistently high, you may want to use a 1/2 dose of insulin (based on carb ratio) prior to work outs.     -please let us or Kristin Stockton if you are having frequent low blood sugars so we can try adjusting your insulin regimen.

## 2018-03-28 NOTE — NURSING NOTE
"Chief Complaint   Patient presents with     Consult     DIABETES TYPE 1 CONSULTATION        Initial /71  Pulse 73  Ht (P) 1.765 m (5' 9.5\")  Wt (P) 87.9 kg (193 lb 12.8 oz)  BMI (P) 28.21 kg/m2 Estimated body mass index is 28.21 kg/(m^2) (pended) as calculated from the following:    Height as of this encounter: (P) 1.765 m (5' 9.5\").    Weight as of this encounter: (P) 87.9 kg (193 lb 12.8 oz).  Medication Reconciliation: complete    "

## 2018-03-28 NOTE — MR AVS SNAPSHOT
After Visit Summary   3/28/2018    Jake Brothers    MRN: [id number]           Patient Information     Date Of Birth          1975        Visit Information        Provider Department      3/28/2018 11:00 AM Alejandra Olguin MD [facility] Endocrinology        Today's Diagnoses     Uncontrolled type 2 diabetes mellitus with insulin therapy (H)        Uncontrolled type 1 diabetes mellitus without complication (H)          Care Instructions    -increase lantus to 25u QHS. Goal fasting blood sugars . If your fasting blood sugars are above that range for most days of the week, increase your dose by 2u each week until you are withinin goal most days per week.     -decrease your carb ratio to novolog 1u:12g Carbs.     -continue your current correction scale, novolog 1u:50mg/dl above 150.     -in terms of exercise, we will need to try out what regimen works best for you. If you want to step up your exercise, I would recommend holding your mealtime insulin before you exercise, and then checking blood sugars 2 hours after your meal.  Correct for your blood sugar at your next meal if it is high.  If the Blood sugar before your next meal is consistently high, you may want to use a 1/2 dose of insulin (based on carb ratio) prior to work outs.     -please let us or Kristin Stockton if you are having frequent low blood sugars so we can try adjusting your insulin regimen.           Follow-ups after your visit        Follow-up notes from your care team     Return in about 3 months (around 6/28/2018) for DMI.      Your next 10 appointments already scheduled     Apr 02, 2018  8:00 AM CDT   SHORT with Caden Mitchell MD   [facility] ([facility])    [address] [place] [address] [phone]            Apr 05, 2018  1:00 PM CDT   (Arrive by 12:45 PM)   Office Visit with Kristin Stockton RN   [facility] Diabetes ([facility])    [address]  [address] [facility] [id number] [phone]           Bring a current list of meds and any records pertaining to this visit. For Physicals, please bring immunization records and any forms needing to be filled out. Please arrive 10 minutes early to complete paperwork.            Apr 24, 2018  9:00 AM CDT   Lab with  LAB   [facility] ([facility])    [address]  [address] [facility] [id number] [phone]            Apr 24, 2018 10:00 AM CDT   (Arrive by 9:45 AM)   Return General Liver with Michael Becerra MD   [facility] Hepatology ([facility])    [address]  [address]  [facility] [id number] [phone]            Jul 11, 2018  3:00 PM CDT   (Arrive by 2:45 PM)   RETURN DIABETES with Alejandra Olguin MD   [facility] Endocrinology ([facility])    [address]  [address] [facility] [id number] [phone]              Who to contact     Please call your clinic at [phone] to:    Ask questions about your health    Make or cancel appointments    Discuss your medicines    Learn about your test results    Speak to your doctor            Additional Information About Your Visit        [employer] Information     [employer] gives you secure access to your electronic health record. If you see a primary care provider, you can also send messages to your care team and make appointments. If you have questions, please call your primary care clinic.  If you do not have a primary care provider, please call [phone] and they will assist you.      [employer] is an electronic gateway that provides easy, online access to your medical records. With [employer], you can request a clinic appointment, read your test results, renew a prescription or communicate with your care team.     To access your existing account, please contact your [facility] or call [phone]  for assistance.        Care EveryWhere ID     This is your Care EveryWhere ID. This could be used by other organizations to access your [place] medical records  [id number]        Your Vitals Were     Pulse                   73            Blood Pressure from Last 3 Encounters:   03/28/18 146/71   03/20/18 110/78   03/14/18 119/85    Weight from Last 3 Encounters:   03/28/18 (P) 87.9 kg (193 lb 12.8 oz)   03/20/18 87.5 kg (193 lb)   03/14/18 83.2 kg (183 lb 8 oz)              Today, you had the following     No orders found for display         Today's Medication Changes          These changes are accurate as of 3/28/18 12:30 PM.  If you have any questions, ask your nurse or doctor.               Start taking these medicines.        Dose/Directions    glucagon 1 MG kit   Commonly known as:  GLUCAGON EMERGENCY   Used for:  Uncontrolled type 1 diabetes mellitus without complication (H)   Started by:  Alejandra Olguin MD        Dose:  1 mg   Inject 1 mg into the muscle once for 1 dose   Quantity:  1 mg   Refills:  1         These medicines have changed or have updated prescriptions.        Dose/Directions    insulin aspart 100 UNIT/ML injection   Commonly known as:  NovoLOG FLEXPEN   This may have changed:  additional instructions   Used for:  Uncontrolled type 1 diabetes mellitus without complication (H)   Changed by:  Alejandra Olguin MD        1 unit per 12 grams of carbohydrates + correction   Quantity:  15 mL   Refills:  11       insulin glargine 100 UNIT/ML injection   Commonly known as:  LANTUS SOLOSTAR   This may have changed:    - how much to take  - additional instructions   Used for:  Uncontrolled type 2 diabetes mellitus with insulin therapy (H)   Changed by:  Alejandra Olguin MD        Dose:  25 Units   Inject 25 Units Subcutaneous At Bedtime Go up by 2 units weekly until fasting blood sugars are .   Quantity:  15 mL   Refills:  11       insulin pen needle 31G X 6 MM   This may have  changed:  additional instructions   Used for:  Uncontrolled type 2 diabetes mellitus with insulin therapy (H)   Changed by:  Alejandra Olguin MD        Use up to 5 daily or as directed.   Quantity:  150 each   Refills:  11            Where to get your medicines      These medications were sent to [facility] [address]  [address], [facility] [address]     Phone:  [phone]     glucagon 1 MG kit    insulin aspart 100 UNIT/ML injection    insulin glargine 100 UNIT/ML injection    insulin pen needle 31G X 6 MM                Primary Care Provider Office Phone # Fax #    Caden Ilya Mitchell [phone]       [address]  [facility] [address]        Equal Access to Services     MARZENA BROCK : Hadii aad ku hadasho Soomaali, waaxda luqadaha, qaybta kaalmada adeegyada, christen rodriguez. So [facility] [phone].    ATENCIÓN: Si habla español, tiene a bender disposición servicios gratuitos de asistencia lingüística. Ll[facility] [phone].    We comply with applicable federal civil rights laws and [place] laws. We do not discriminate on the basis of race, color, national origin, age, disability, sex, sexual orientation, or gender identity.            Thank you!     Thank you for choosing [facility] ENDOCRINOLOGY  for your care. Our goal is always to provide you with excellent care. Hearing back from our patients is one way we can continue to improve our services. Please take a few minutes to complete the written survey that you may receive in the mail after your visit with us. Thank you!             Your Updated Medication List - Protect others around you: Learn how to safely use, store and throw away your medicines at www.disposemymeds.org.          This list is accurate as of 3/28/18 12:30 PM.  Always use your most recent med list.                   Brand Name Dispense Instructions for use Diagnosis    albuterol 108 (90 BASE)  MCG/ACT Inhaler    PROAIR HFA/PROVENTIL HFA/VENTOLIN HFA    1 Inhaler    Inhale 2 puffs into the lungs every 6 hours as needed for shortness of breath / dyspnea or wheezing    Cough       aspirin 81 MG tablet     30 tablet    Take 1 tablet (81 mg) by mouth daily        atorvastatin 20 MG tablet    LIPITOR    90 tablet    Take 1 tablet (20 mg) by mouth daily    Uncontrolled type 1 diabetes mellitus without complication (H)       blood glucose lancets standard    no brand specified    200 each    Use to test blood sugar 6 times daily or as directed.    Uncontrolled type 2 diabetes mellitus with insulin therapy (H)       blood glucose monitoring meter device kit    no brand specified    1 kit    Use to test blood sugar 2 times daily or as directed.    Uncontrolled type 2 diabetes mellitus with insulin therapy (H)       blood glucose monitoring test strip    ONETOUCH VERIO IQ    150 strip    Use to test blood sugars 5 times daily or as directed.    Uncontrolled type 1 diabetes mellitus without complication (H)       glucagon 1 MG kit    GLUCAGON EMERGENCY    1 mg    Inject 1 mg into the muscle once for 1 dose    Uncontrolled type 1 diabetes mellitus without complication (H)       insulin aspart 100 UNIT/ML injection    NovoLOG FLEXPEN    15 mL    1 unit per 12 grams of carbohydrates + correction    Uncontrolled type 1 diabetes mellitus without complication (H)       insulin glargine 100 UNIT/ML injection    LANTUS SOLOSTAR    15 mL    Inject 25 Units Subcutaneous At Bedtime Go up by 2 units weekly until fasting blood sugars are .    Uncontrolled type 2 diabetes mellitus with insulin therapy (H)       insulin pen needle 31G X 6 MM     150 each    Use up to 5 daily or as directed.    Uncontrolled type 2 diabetes mellitus with insulin therapy (H)       IRON SUPPLEMENT PO      Take 2 tablets by mouth daily        MULTIVITAMIN ADULT PO

## 2018-03-29 ENCOUNTER — DOCUMENTATION ONLY (OUTPATIENT)
Dept: FAMILY MEDICINE | Facility: CLINIC | Age: 43
End: 2018-03-29

## 2018-04-02 ENCOUNTER — OFFICE VISIT (OUTPATIENT)
Dept: FAMILY MEDICINE | Facility: CLINIC | Age: 43
End: 2018-04-02
Payer: COMMERCIAL

## 2018-04-02 VITALS
DIASTOLIC BLOOD PRESSURE: 74 MMHG | HEART RATE: 70 BPM | BODY MASS INDEX: 28.02 KG/M2 | WEIGHT: 195.75 LBS | HEIGHT: 70 IN | OXYGEN SATURATION: 99 % | TEMPERATURE: 97.7 F | RESPIRATION RATE: 12 BRPM | SYSTOLIC BLOOD PRESSURE: 108 MMHG

## 2018-04-02 DIAGNOSIS — E10.9 TYPE 1 DIABETES MELLITUS WITHOUT COMPLICATION (H): Primary | ICD-10-CM

## 2018-04-02 PROCEDURE — 99214 OFFICE O/P EST MOD 30 MIN: CPT | Performed by: FAMILY MEDICINE

## 2018-04-02 NOTE — PROGRESS NOTES
SUBJECTIVE:   Jake Brothers is a 43 year old male who presents to clinic today for the following health issues:      Genitourinary symptoms      Duration: Feb. But has been less since he got his blood sugars under control     Description:  frequency    Intensity:  mild    Accompanying signs and symptoms (fever/discharge/nausea/vomiting/back or abdominal pain):  None    History (frequent UTI's/kidney stones/prostate problems): diabetes   Sexually active: YES    Precipitating or alleviating factors: None    Therapies tried and outcome: none    Additional: pt would like to have workability forms filled out    Last week - vision improved.   Blood sugars in 120s.  Couple of blood sugars in 60s.   Insulin - 25 lantus  Carb counting and correction accordingly 1 units per 12 grams of carbs.   Seeing endocrine and diabetic educator.     Ready to go back to work next week.     Problem list and histories reviewed & adjusted, as indicated.  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 No     Caffeine Concern Not Asked     2 cups of coffee in am     Exercise Not Asked     Rides his bike everywhere 3 to 4 times a week     Social History Narrative    ** Merged History Encounter **         Balanced Diet - Yes    Osteoporosis Preventative  "measures-  Pt does not eat diary    Regular Exercise -  Yes Describe bike every where he goes    Dental Exam up - NO    Eye Exam - YES - Date: yearly    Self Testicular Exam -  No    Do you have any concerns about STD's -  No    Abuse: Current or Past (Physical, Sexual or Emotional)- No    Do you feel safe in your environment - Yes    Guns stored in the home - No    Sunscreen used - No    Seatbelts used - Yes    Lipids - NO    Glucose -  NO    Colon Cancer Screening - No    Hemoccults - NO    PSA - NO    Digital Rectal Exam - NO    Immunizations reviewed and up to date - Yes.Meagan ELLIOT Soliz    Pt had a tetanus shot in the past three years. But he is not sure.             No Known Allergies  Patient Active Problem List   Diagnosis     Obesity     CARDIOVASCULAR SCREENING; LDL GOAL LESS THAN 160     Eczema     Elevated LFTs     Cervicalgia     Hepatic steatosis     Celiac disease     Vitiligo     Iron deficiency     Type 1 diabetes mellitus without complication (H)     Reviewed medications, social history and  past medical and surgical history.    Review of system: for general, respiratory, CVS, GI and psychiatry negative except for noted above.     EXAM:  /74 (Cuff Size: Adult Large)  Pulse 70  Temp 97.7  F (36.5  C) (Oral)  Resp 12  Ht 5' 10\" (1.778 m)  Wt 195 lb 12 oz (88.8 kg)  SpO2 99%  BMI 28.09 kg/m2  Constitutional: healthy, alert and no distress   Psychiatric: mentation appears normal and affect normal/bright  Cardiovascular: RRR. No murmurs,  Respiratory: negative, Lungs clear. No crackles or wheezing. No tachypnea.        ASSESSMENT / PLAN:  (E10.9) Type 1 diabetes mellitus without complication (H)  (primary encounter diagnosis)  Comment: Self-reported improvement in his blood sugar.  He is also noticing significant improvement in his physical symptoms including dizziness and blurry vision.  He is feeling comfortable going back to his work.  I filled out his FMLA for continuously of absence " for the days that he missed and he is planning to go back to work next week.  He is getting FMLA from 3/5/18 to 4/9/18  Plan: He is seeing an endocrinologist and further treatment as per them.  Original copy of the form given back to patient.    I spent > 25 minutes and more than 1/2 of the time was in counselling and coordination of care regarding above mentioned issues.

## 2018-04-02 NOTE — MR AVS SNAPSHOT
After Visit Summary   4/2/2018    Jake Brothers    MRN: [id number]           Patient Information     Date Of Birth          1975        Visit Information        Provider Department      4/2/2018 8:00 AM Caden Mitchell MD [facility] [place]        Today's Diagnoses     Type 1 diabetes mellitus without complication (H)    -  1       Follow-ups after your visit        Your next 10 appointments already scheduled     Apr 05, 2018  1:00 PM CDT   (Arrive by 12:45 PM)   Office Visit with Kristin Stockton RN   [facility] Diabetes ([facility])    [address] [place] [address]  [address] [facility] [id number] [phone]           Bring a current list of meds and any records pertaining to this visit. For Physicals, please bring immunization records and any forms needing to be filled out. Please arrive 10 minutes early to complete paperwork.            Apr 24, 2018  9:00 AM CDT   Lab with  LAB   [facility] Lab ([facility])    [address] [place] [id number] [phone]            Apr 24, 2018 10:00 AM CDT   (Arrive by 9:45 AM)   Return General Liver with Michael Becerra MD   [facility] Hepatology ([facility])    [address] [place] [address]  [address]  [facility] [id number] [phone]            Jul 11, 2018  3:00 PM CDT   (Arrive by 2:45 PM)   RETURN DIABETES with Alejandra Olguin MD   [facility] Endocrinology ([facility])    [address] [place] [id number] [phone]              Who to contact     If you have questions or need follow up information about today's clinic visit or your schedule please contact [facility] directly at [phone].  Normal or non-critical lab and imaging results will be communicated to you by MyChart, letter or phone within 4 business days after the clinic has received the results. If you do  "not hear from us within 7 days, please contact the clinic through [employer] or phone. If you have a critical or abnormal lab result, we will notify you by phone as soon as possible.  Submit refill requests through [employer] or call your pharmacy and they will forward the refill request to us. Please allow 3 business days for your refill to be completed.          Additional Information About Your Visit        [employer] Information     [employer] gives you secure access to your electronic health record. If you see a primary care provider, you can also send messages to your care team and make appointments. If you have questions, please call your primary care clinic.  If you do not have a primary care provider, please call [phone] and they will assist you.        Care EveryWhere ID     This is your Care EveryWhere ID. This could be used by other organizations to access your [place] medical records  [id number]        Your Vitals Were     Pulse Temperature Respirations Height Pulse Oximetry BMI (Body Mass Index)    70 97.7  F (36.5  C) (Oral) 12 5' 10\" (1.778 m) 99% 28.09 kg/m2       Blood Pressure from Last 3 Encounters:   04/02/18 108/74   03/28/18 146/71   03/20/18 110/78    Weight from Last 3 Encounters:   04/02/18 195 lb 12 oz (88.8 kg)   03/28/18 (P) 193 lb 12.8 oz (87.9 kg)   03/20/18 193 lb (87.5 kg)              Today, you had the following     No orders found for display       Primary Care Provider Office Phone # Fax #    Caden Ilya Mitchell [phone] [address] [place] [address]        Equal Access to Services     JIMENEZ BROCK : Hadchristian Bearden, christen lopez. So [facility] [phone].    ATENCIÓN: Si habla español, tiene a bender disposición servicios gratuitos de asistencia lingüística. Sangita al [phone].    We comply with applicable federal civil rights laws and [place] laws. We do not " discriminate on the basis of race, color, national origin, age, disability, sex, sexual orientation, or gender identity.            Thank you!     Thank you for choosing [facility]  for your care. Our goal is always to provide you with excellent care. Hearing back from our patients is one way we can continue to improve our services. Please take a few minutes to complete the written survey that you may receive in the mail after your visit with us. Thank you!             Your Updated Medication List - Protect others around you: Learn how to safely use, store and throw away your medicines at www.disposemymeds.org.          This list is accurate as of 4/2/18  1:13 PM.  Always use your most recent med list.                   Brand Name Dispense Instructions for use Diagnosis    albuterol 108 (90 BASE) MCG/ACT Inhaler    PROAIR HFA/PROVENTIL HFA/VENTOLIN HFA    1 Inhaler    Inhale 2 puffs into the lungs every 6 hours as needed for shortness of breath / dyspnea or wheezing    Cough       aspirin 81 MG tablet     30 tablet    Take 1 tablet (81 mg) by mouth daily        atorvastatin 20 MG tablet    LIPITOR    90 tablet    Take 1 tablet (20 mg) by mouth daily    Uncontrolled type 1 diabetes mellitus without complication (H)       blood glucose lancets standard    no brand specified    200 each    Use to test blood sugar 6 times daily or as directed.    Uncontrolled type 2 diabetes mellitus with insulin therapy (H), Uncontrolled type 1 diabetes mellitus without complication (H)       blood glucose monitoring meter device kit    no brand specified    1 kit    Use to test blood sugar 2 times daily or as directed.    Uncontrolled type 2 diabetes mellitus with insulin therapy (H)       blood glucose monitoring test strip    ONETOUCH VERIO IQ    300 strip    Use to test blood sugars 10 times daily or as directed.    Uncontrolled type 1 diabetes mellitus without complication (H), Uncontrolled type 2 diabetes mellitus  with insulin therapy (H)       glucagon 1 MG kit    GLUCAGON EMERGENCY    1 mg    Inject 1 mg into the muscle once for 1 dose    Uncontrolled type 1 diabetes mellitus without complication (H)       insulin aspart 100 UNIT/ML injection    NovoLOG FLEXPEN    15 mL    1 unit per 12 grams of carbohydrates + correction with meals  TID  approx  25 units daily Max    Uncontrolled type 1 diabetes mellitus without complication (H)       insulin glargine 100 UNIT/ML injection    LANTUS SOLOSTAR    15 mL    Inject 25 Units Subcutaneous At Bedtime Go up by 2 units weekly until fasting blood sugars are .    Uncontrolled type 2 diabetes mellitus with insulin therapy (H)       * insulin pen needle 31G X 6 MM     150 each    Use up to 5 daily or as directed.    Uncontrolled type 2 diabetes mellitus with insulin therapy (H)       * insulin pen needle 31G X 5 MM    B-D U/F    150 each    Use 5  time(s) per day.    Uncontrolled type 1 diabetes mellitus without complication (H)       * insulin pen needle 32G X 4 MM     200 each    Use 6 pen needles daily or as directed.    Uncontrolled type 2 diabetes mellitus with insulin therapy (H), Uncontrolled type 1 diabetes mellitus without complication (H)       IRON SUPPLEMENT PO      Take 2 tablets by mouth daily        MULTIVITAMIN ADULT PO           * Notice:  This list has 3 medication(s) that are the same as other medications prescribed for you. Read the directions carefully, and ask your doctor or other care provider to review them with you.

## 2018-04-05 ENCOUNTER — OFFICE VISIT (OUTPATIENT)
Dept: EDUCATION SERVICES | Facility: CLINIC | Age: 43
End: 2018-04-05
Payer: COMMERCIAL

## 2018-04-05 ENCOUNTER — MYC MEDICAL ADVICE (OUTPATIENT)
Dept: EDUCATION SERVICES | Facility: CLINIC | Age: 43
End: 2018-04-05

## 2018-04-05 DIAGNOSIS — E10.65 TYPE 1 DIABETES MELLITUS WITH HYPERGLYCEMIA (H): Primary | ICD-10-CM

## 2018-04-05 RX ORDER — FLASH GLUCOSE SENSOR
1 KIT MISCELLANEOUS DAILY
Qty: 1 DEVICE | Refills: 1 | Status: SHIPPED | OUTPATIENT
Start: 2018-04-05 | End: 2019-01-08

## 2018-04-05 RX ORDER — FLASH GLUCOSE SENSOR
KIT MISCELLANEOUS
Qty: 3 EACH | Refills: 11 | Status: SHIPPED | OUTPATIENT
Start: 2018-04-05 | End: 2018-07-20

## 2018-04-05 NOTE — PATIENT INSTRUCTIONS
1.  Check with pharmacy about the Sukhi Flash.  Let me know if not covered.    2.  Cut your Lantus back to 23 or 24 units tonight.    3.  Get a medical ID    4.  Use exercise guidelines you were given for activity.    5.  Return 4-6 weeks.    Kristin Stockton RN,CDE  [address] [place] [address]  Phone: [phone]  [email or web address]

## 2018-04-05 NOTE — MR AVS SNAPSHOT
After Visit Summary   4/5/2018    Jake Brothers    MRN: [id number]           Patient Information     Date Of Birth          1975        Visit Information        Provider Department      4/5/2018 1:00 PM Kristin Stockton RN [facility] Diabetes        Today's Diagnoses     Type 1 diabetes mellitus with hyperglycemia (H)    -  1    Uncontrolled type 1 diabetes mellitus without complication (H)          Care Instructions    1.  Check with pharmacy about the Sukhi Flash.  Let me know if not covered.    2.  Cut your Lantus back to 23 or 24 units tonight.    3.  Get a medical ID    4.  Use exercise guidelines you were given for activity.    5.  Return 4-6 weeks.    Kristin Stockton RN,CDE  [facility]  [address] [place] [address]  Phone: [phone]  [email or web address]              Follow-ups after your visit        Your next 10 appointments already scheduled     Apr 24, 2018  9:00 AM CDT   Lab with  LAB   [facility] Lab ([facility])    [address] [place] [address] [phone]            Apr 24, 2018 10:00 AM CDT   (Arrive by 9:45 AM)   Return General Liver with Michael Becerra MD   [facility] Hepatology ([facility])    [address] [place] [address]  [address]  [facility] [address] [phone]            Jul 11, 2018  3:00 PM CDT   (Arrive by 2:45 PM)   RETURN DIABETES with Alejandra Olguin MD   [facility] Endocrinology ([facility])    [address] [place] [address]  3rd Floor  [facility] [address] [phone]              Who to contact     Please call your clinic at [phone] to:    Ask questions about your health    Make or cancel appointments    Discuss your medicines    Learn about your test results    Speak to your doctor            Additional Information About Your Visit        MyChart Information     MyChart gives you secure access to your electronic health record. If you  see a primary care provider, you can also send messages to your care team and make appointments. If you have questions, please call your primary care clinic.  If you do not have a primary care provider, please call [phone] and they will assist you.      [address] is an electronic gateway that provides easy, online access to your medical records. With [address], you can request a clinic appointment, read your test results, renew a prescription or communicate with your care team.     To access your existing account, please contact your [facility] or call [phone] for assistance.        Care EveryWhere ID     This is your Care EveryWhere ID. This could be used by other organizations to access your [facility] medical records  [id number]         Blood Pressure from Last 3 Encounters:   04/02/18 108/74   03/28/18 146/71   03/20/18 110/78    Weight from Last 3 Encounters:   04/02/18 88.8 kg (195 lb 12 oz)   03/28/18 (P) 87.9 kg (193 lb 12.8 oz)   03/20/18 87.5 kg (193 lb)              Today, you had the following     No orders found for display         Today's Medication Changes          These changes are accurate as of 4/5/18  2:04 PM.  If you have any questions, ask your nurse or doctor.               Start taking these medicines.        Dose/Directions    continuous blood glucose monitoring sensor   Used for:  Type 1 diabetes mellitus with hyperglycemia (H)        For use with Freestyle Sukhi Flash  for continuous monitioring of blood glucose levels. Replace sensor every 10 days.   Quantity:  3 each   Refills:  11       FREESTYLE SUKHI READER Alley   Used for:  Type 1 diabetes mellitus with hyperglycemia (H)        Dose:  1 each   1 each daily   Quantity:  1 Device   Refills:  1            Where to get your medicines      These medications were sent to [facility] [id number] [place] [address] AVE AT [place] [address] [facility],  [facility] [address]     Phone:  [phone]     continuous blood glucose monitoring sensor    FREESTYLE CARI READER Alley                Primary Care Provider Office Phone # Fax #    Caden Ilya Mitchell [phone] [address] [employer] [address]        Equal Access to Services     MARZENA BROCK : Hadii aad ku hadasho Soomaali, waaxda luqadaha, qaybta kaalmada adeegyada, waxay laurain hayangeln adece gaytanotto lanicol . So [facility] [phone].    ATENCIÓN: Si habla español, tiene a bender disposición servicios gratuitos de asistencia lingüística. Sangita al [phone].    We comply with applicable federal civil rights laws and [place] laws. We do not discriminate on the basis of race, color, national origin, age, disability, sex, sexual orientation, or gender identity.            Thank you!     Thank you for choosing [facility] DIABETES  for your care. Our goal is always to provide you with excellent care. Hearing back from our patients is one way we can continue to improve our services. Please take a few minutes to complete the written survey that you may receive in the mail after your visit with us. Thank you!             Your Updated Medication List - Protect others around you: Learn how to safely use, store and throw away your medicines at www.disposemymeds.org.          This list is accurate as of 4/5/18  2:04 PM.  Always use your most recent med list.                   Brand Name Dispense Instructions for use Diagnosis    albuterol 108 (90 BASE) MCG/ACT Inhaler    PROAIR HFA/PROVENTIL HFA/VENTOLIN HFA    1 Inhaler    Inhale 2 puffs into the lungs every 6 hours as needed for shortness of breath / dyspnea or wheezing    Cough       aspirin 81 MG tablet     30 tablet    Take 1 tablet (81 mg) by mouth daily        atorvastatin 20 MG tablet    LIPITOR    90 tablet    Take 1 tablet (20 mg) by mouth daily    Uncontrolled type 1 diabetes mellitus without complication (H)       blood glucose  lancets standard    no brand specified    200 each    Use to test blood sugar 6 times daily or as directed.    Uncontrolled type 2 diabetes mellitus with insulin therapy (H), Uncontrolled type 1 diabetes mellitus without complication (H)       blood glucose monitoring meter device kit    no brand specified    1 kit    Use to test blood sugar 2 times daily or as directed.    Uncontrolled type 2 diabetes mellitus with insulin therapy (H)       blood glucose monitoring test strip    ONETOUCH VERIO IQ    300 strip    Use to test blood sugars 10 times daily or as directed.    Uncontrolled type 1 diabetes mellitus without complication (H), Uncontrolled type 2 diabetes mellitus with insulin therapy (H)       continuous blood glucose monitoring sensor     3 each    For use with Freestyle Sukhi Flash  for continuous monitioring of blood glucose levels. Replace sensor every 10 days.    Type 1 diabetes mellitus with hyperglycemia (H)       FREESTYLE SUKHI READER Alley     1 Device    1 each daily    Type 1 diabetes mellitus with hyperglycemia (H)       glucagon 1 MG kit    GLUCAGON EMERGENCY    1 mg    Inject 1 mg into the muscle once for 1 dose    Uncontrolled type 1 diabetes mellitus without complication (H)       insulin aspart 100 UNIT/ML injection    NovoLOG FLEXPEN    15 mL    1 unit per 12 grams of carbohydrates + correction with meals  TID  approx  25 units daily Max    Uncontrolled type 1 diabetes mellitus without complication (H)       insulin glargine 100 UNIT/ML injection    LANTUS SOLOSTAR    15 mL    Inject 25 Units Subcutaneous At Bedtime Go up by 2 units weekly until fasting blood sugars are .    Uncontrolled type 2 diabetes mellitus with insulin therapy (H)       * insulin pen needle 31G X 6 MM     150 each    Use up to 5 daily or as directed.    Uncontrolled type 2 diabetes mellitus with insulin therapy (H)       * insulin pen needle 31G X 5 MM    B-D U/F    150 each    Use 5  time(s) per day.     Uncontrolled type 1 diabetes mellitus without complication (H)       * insulin pen needle 32G X 4 MM     200 each    Use 6 pen needles daily or as directed.    Uncontrolled type 2 diabetes mellitus with insulin therapy (H), Uncontrolled type 1 diabetes mellitus without complication (H)       IRON SUPPLEMENT PO      Take 2 tablets by mouth daily        MULTIVITAMIN ADULT PO           * Notice:  This list has 3 medication(s) that are the same as other medications prescribed for you. Read the directions carefully, and ask your doctor or other care provider to review them with you.

## 2018-04-05 NOTE — PROGRESS NOTES
DIABETES EDUCATION NOTE:    Jake Brothers presents today for education related to Type 1 diabetes.    Patient is being treated with:  diet, exercise, insulin and SMBG  He is accompanied by self    PATIENT CONCERNS RELATED TO DIABETES SELF MANAGEMENT:   When he's going low he cannot feel it and it takes him a long time to come back up    Current Diabetes Management per Patient:  Taking diabetes medications?   yes:     Diabetes Medication(s)     Diabetic Other Sig    glucagon (GLUCAGON EMERGENCY) 1 MG kit Inject 1 mg into the muscle once for 1 dose    Insulin Sig    insulin glargine (LANTUS SOLOSTAR) 100 UNIT/ML injection Inject 25 Units Subcutaneous At Bedtime Go up by 2 units weekly until fasting blood sugars are .    insulin aspart (NOVOLOG FLEXPEN) 100 UNIT/ML injection 1 unit per 12 grams of carbohydrates + correction with meals  TID  approx  25 units daily Max          Monitoring  Patient glucose self monitoring as follows: 10 times daily.   BG meter: One Touch Verio Flex meter  BG results: see blood glucose log attached to this encounter     BG values are: In goal  Patient's most recent   Lab Results   Component Value Date    A1C >15.0 03/14/2018    is not meeting goal of <7.0       Exercise: moderate regular exercise program which includes walking 30 minutes, rowing machine 20 minutes, 35-40 minute bike rides    Nutrition:   Patient currently eats 3 meals a day, feels confidence with carb counting    Hypoglycemia:   Patient is at risk of hypoglycemia? Yes, Fresh Bars           Stress Management: Some stress about going back to work, his job is very demanding and there is not any time for self-care.              EDUCATION and INSTRUCTION PROVIDED AT THIS VISIT:     Jake has adjusted his insulin doses a bit.  He is using 1 unit per 15 grams right now.  He was dropping too low with 12.  His mornings have been on the low side for the past couple of days.  Will pull back on the Lantus a unit or two.    We  covered all of the cgm options and he would like to move forward with a Sukhi at this time.  Will send a prescription to his pharmacy.  He has some anxiety about returning to work with diabetes so this should help.    We talked about the forms related to diabetes and his 's license and it needs to be updated yearly.  We discussed travel and diabetes although he has no immediate plans.  He was shown the [employer] bags.    He will work on getting a medical ID.    Patient-stated goal written and given to Jake Brothers.  Verbalized and demonstrated understanding of instructions.     PLAN:  Reduce Lantus to 23-24 units at bedtime  Expect a copy of your 's license form to be mailed to your home  Check at [facility] to see if the Sukhi is covered.    Let me know how you like the system  AVS printed and given to patient    FOLLOW-UP:    4 weeks    Time spent with patient at today's visit was 60 minutes.      Any diabetes medication dose changes were made via the CDE Protocol and Collaborative Practice Agreement with [facility] and  [facility].  A copy of this encounter was provided to patient's referring provider.

## 2018-04-24 ENCOUNTER — OFFICE VISIT (OUTPATIENT)
Dept: GASTROENTEROLOGY | Facility: CLINIC | Age: 43
End: 2018-04-24
Attending: INTERNAL MEDICINE
Payer: COMMERCIAL

## 2018-04-24 VITALS
SYSTOLIC BLOOD PRESSURE: 123 MMHG | HEART RATE: 62 BPM | DIASTOLIC BLOOD PRESSURE: 84 MMHG | OXYGEN SATURATION: 99 % | BODY MASS INDEX: 28.63 KG/M2 | HEIGHT: 70 IN | TEMPERATURE: 97.6 F | WEIGHT: 200 LBS

## 2018-04-24 DIAGNOSIS — K90.0 CELIAC DISEASE: ICD-10-CM

## 2018-04-24 DIAGNOSIS — K90.0 CELIAC DISEASE: Primary | ICD-10-CM

## 2018-04-24 LAB
ALBUMIN SERPL-MCNC: 3.8 G/DL (ref 3.4–5)
ALP SERPL-CCNC: 72 U/L (ref 40–150)
ALT SERPL W P-5'-P-CCNC: 37 U/L (ref 0–70)
ANION GAP SERPL CALCULATED.3IONS-SCNC: 4 MMOL/L (ref 3–14)
AST SERPL W P-5'-P-CCNC: 26 U/L (ref 0–45)
BILIRUB DIRECT SERPL-MCNC: <0.1 MG/DL (ref 0–0.2)
BILIRUB SERPL-MCNC: 0.3 MG/DL (ref 0.2–1.3)
BUN SERPL-MCNC: 13 MG/DL (ref 7–30)
CALCIUM SERPL-MCNC: 9.1 MG/DL (ref 8.5–10.1)
CHLORIDE SERPL-SCNC: 104 MMOL/L (ref 94–109)
CO2 SERPL-SCNC: 30 MMOL/L (ref 20–32)
CREAT SERPL-MCNC: 0.64 MG/DL (ref 0.66–1.25)
ERYTHROCYTE [DISTWIDTH] IN BLOOD BY AUTOMATED COUNT: 15.4 % (ref 10–15)
GFR SERPL CREATININE-BSD FRML MDRD: >90 ML/MIN/1.7M2
GLUCOSE SERPL-MCNC: 80 MG/DL (ref 70–99)
HCT VFR BLD AUTO: 46 % (ref 40–53)
HGB BLD-MCNC: 14.9 G/DL (ref 13.3–17.7)
INR PPP: 0.98 (ref 0.86–1.14)
MCH RBC QN AUTO: 27.9 PG (ref 26.5–33)
MCHC RBC AUTO-ENTMCNC: 32.4 G/DL (ref 31.5–36.5)
MCV RBC AUTO: 86 FL (ref 78–100)
PLATELET # BLD AUTO: 211 10E9/L (ref 150–450)
POTASSIUM SERPL-SCNC: 4.4 MMOL/L (ref 3.4–5.3)
PROT SERPL-MCNC: 7.1 G/DL (ref 6.8–8.8)
RBC # BLD AUTO: 5.34 10E12/L (ref 4.4–5.9)
SODIUM SERPL-SCNC: 138 MMOL/L (ref 133–144)
WBC # BLD AUTO: 4.3 10E9/L (ref 4–11)

## 2018-04-24 PROCEDURE — 80076 HEPATIC FUNCTION PANEL: CPT | Performed by: INTERNAL MEDICINE

## 2018-04-24 PROCEDURE — 85027 COMPLETE CBC AUTOMATED: CPT | Performed by: INTERNAL MEDICINE

## 2018-04-24 PROCEDURE — 85610 PROTHROMBIN TIME: CPT | Performed by: INTERNAL MEDICINE

## 2018-04-24 PROCEDURE — 86038 ANTINUCLEAR ANTIBODIES: CPT | Performed by: INTERNAL MEDICINE

## 2018-04-24 PROCEDURE — 36415 COLL VENOUS BLD VENIPUNCTURE: CPT | Performed by: INTERNAL MEDICINE

## 2018-04-24 PROCEDURE — 83516 IMMUNOASSAY NONANTIBODY: CPT | Performed by: INTERNAL MEDICINE

## 2018-04-24 PROCEDURE — G0463 HOSPITAL OUTPT CLINIC VISIT: HCPCS | Mod: ZF

## 2018-04-24 PROCEDURE — 80048 BASIC METABOLIC PNL TOTAL CA: CPT | Performed by: INTERNAL MEDICINE

## 2018-04-24 ASSESSMENT — PAIN SCALES - GENERAL: PAINLEVEL: NO PAIN (0)

## 2018-04-24 NOTE — MR AVS SNAPSHOT
After Visit Summary   4/24/2018    Jake Brothers    MRN: [id number]           Patient Information     Date Of Birth          1975        Visit Information        Provider Department      4/24/2018 10:00 AM Michael Becerra MD [facility] Hepatology        Today's Diagnoses     Celiac disease    -  1       Follow-ups after your visit        Follow-up notes from your care team     Return if symptoms worsen or fail to improve.      Your next 10 appointments already scheduled     Jul 11, 2018  3:00 PM CDT   (Arrive by 2:45 PM)   RETURN DIABETES with Alejandra Olguin MD   [facility] Endocrinology ([facility])    [address] [place] [address]  3rd [facility] [address] [phone]              Who to contact     If you have questions or need follow up information about today's clinic visit or your schedule please contact [facility] HEPATOLOGY directly at [phone].  Normal or non-critical lab and imaging results will be communicated to you by MyChart, letter or phone within 4 business days after the clinic has received the results. If you do not hear from us within 7 days, please contact the clinic through [facility] [employer] or phone. If you have a critical or abnormal lab result, we will notify you by phone as soon as possible.  Submit refill requests through [employer] or call your pharmacy and they will forward the refill request to us. Please allow 3 business days for your refill to be completed.          Additional Information About Your Visit        MyChart Information     [employer] gives you secure access to your electronic health record. If you see a primary care provider, you can also send messages to your care team and make appointments. If you have questions, please call your primary care clinic.  If you do not have a primary care provider, please call [phone] and they will assist you.        Care EveryWhere ID     This is your Care EveryWhere ID. This could be used by other  "organizations to access your [facility] medical records  [id number]        Your Vitals Were     Pulse Temperature Height Pulse Oximetry BMI (Body Mass Index)       62 97.6  F (36.4  C) (Oral) 1.778 m (5' 10\") 99% 28.7 kg/m2        Blood Pressure from Last 3 Encounters:   04/24/18 123/84   04/02/18 108/74   03/28/18 146/71    Weight from Last 3 Encounters:   04/24/18 90.7 kg (200 lb)   04/02/18 88.8 kg (195 lb 12 oz)   03/28/18 (P) 87.9 kg (193 lb 12.8 oz)              Today, you had the following     No orders found for display         Today's Medication Changes          These changes are accurate as of 4/24/18 11:59 PM.  If you have any questions, ask your nurse or doctor.               These medicines have changed or have updated prescriptions.        Dose/Directions    insulin glargine 100 UNIT/ML injection   Commonly known as:  LANTUS SOLOSTAR   This may have changed:    - how much to take  - additional instructions   Used for:  Uncontrolled type 2 diabetes mellitus with insulin therapy (H)        Dose:  25 Units   Inject 25 Units Subcutaneous At Bedtime Go up by 2 units weekly until fasting blood sugars are .   Quantity:  15 mL   Refills:  11                Primary Care Provider Office Phone # Fax #    Caden Ilya Mitchell [phone] [address] [place] [address]        Equal Access to Services     MARZENA BROCK AH: Hadii anjelica nolan Soparish, waaxda luqadaha, qaybta kaalmada jimmie, christen rodriguez. So [facility] [phone].    ATENCIÓN: Si habla español, tiene a bender disposición servicios gratuitos de asistencia lingüística. Llame al [phone].    We comply with applicable federal civil rights laws and [place] laws. We do not discriminate on the basis of race, color, national origin, age, disability, sex, sexual orientation, or gender identity.            Thank you!     Thank you for choosing [facility] HEPATOLOGY  for your care. Our goal is " always to provide you with excellent care. Hearing back from our patients is one way we can continue to improve our services. Please take a few minutes to complete the written survey that you may receive in the mail after your visit with us. Thank you!             Your Updated Medication List - Protect others around you: Learn how to safely use, store and throw away your medicines at www.disposemymeds.org.          This list is accurate as of 4/24/18 11:59 PM.  Always use your most recent med list.                   Brand Name Dispense Instructions for use Diagnosis    albuterol 108 (90 Base) MCG/ACT Inhaler    PROAIR HFA/PROVENTIL HFA/VENTOLIN HFA    1 Inhaler    Inhale 2 puffs into the lungs every 6 hours as needed for shortness of breath / dyspnea or wheezing    Cough       aspirin 81 MG tablet     30 tablet    Take 1 tablet (81 mg) by mouth daily        atorvastatin 20 MG tablet    LIPITOR    90 tablet    Take 1 tablet (20 mg) by mouth daily    Uncontrolled type 1 diabetes mellitus without complication (H)       blood glucose lancets standard    no brand specified    200 each    Use to test blood sugar 6 times daily or as directed.    Uncontrolled type 2 diabetes mellitus with insulin therapy (H), Uncontrolled type 1 diabetes mellitus without complication (H)       blood glucose monitoring meter device kit    no brand specified    1 kit    Use to test blood sugar 2 times daily or as directed.    Uncontrolled type 2 diabetes mellitus with insulin therapy (H)       blood glucose monitoring test strip    ONETOUCH VERIO IQ    300 strip    Use to test blood sugars 10 times daily or as directed.    Uncontrolled type 1 diabetes mellitus without complication (H), Uncontrolled type 2 diabetes mellitus with insulin therapy (H)       continuous blood glucose monitoring sensor     3 each    For use with Freestyle Sukhi Flash  for continuous monitioring of blood glucose levels. Replace sensor every 10 days.    Type 1  diabetes mellitus with hyperglycemia (H)       FREESTYLE CARI READER Alley     1 Device    1 each daily    Type 1 diabetes mellitus with hyperglycemia (H)       glucagon 1 MG kit    GLUCAGON EMERGENCY    1 mg    Inject 1 mg into the muscle once for 1 dose    Uncontrolled type 1 diabetes mellitus without complication (H)       insulin glargine 100 UNIT/ML injection    LANTUS SOLOSTAR    15 mL    Inject 25 Units Subcutaneous At Bedtime Go up by 2 units weekly until fasting blood sugars are .    Uncontrolled type 2 diabetes mellitus with insulin therapy (H)       * insulin pen needle 31G X 6 MM     150 each    Use up to 5 daily or as directed.    Uncontrolled type 2 diabetes mellitus with insulin therapy (H)       * insulin pen needle 31G X 5 MM    B-D U/F    150 each    Use 5  time(s) per day.    Uncontrolled type 1 diabetes mellitus without complication (H)       * insulin pen needle 32G X 4 MM     200 each    Use 6 pen needles daily or as directed.    Uncontrolled type 2 diabetes mellitus with insulin therapy (H), Uncontrolled type 1 diabetes mellitus without complication (H)       IRON SUPPLEMENT PO      Take 2 tablets by mouth daily        MULTIVITAMIN ADULT PO           NovoLOG FLEXPEN 100 UNIT/ML injection   Generic drug:  insulin aspart     15 mL    1 unit per 15 grams of carbohydrates + correction with meals  TID  approx  25 units daily Max    Uncontrolled type 1 diabetes mellitus without complication (H)       * Notice:  This list has 3 medication(s) that are the same as other medications prescribed for you. Read the directions carefully, and ask your doctor or other care provider to review them with you.

## 2018-04-24 NOTE — PROGRESS NOTES
HISTORY OF PRESENT ILLNESS:  I had the pleasure of seeing Jake Brothers for followup in Liver Clinic at the [facility] on 04/24/2018.  Mr. Brothers returns for followup of what seems to be quite clear now abnormal liver tests secondary to celiac disease.  When I saw him last he did have abnormal liver tests and had self-diagnosed celiac disease.  He was put on a gluten-free diet and subsequently his TTG test came back positive.  He has continued to be on a gluten-free diet.  I do think since I saw him at his last he was diagnosed with type 1 diabetes.  There is a strong association between type 1 diabetes and celiac disease that in many cases also includes autoimmune hepatitis.  However, his liver tests have remained normal.      He actually feels fairly well at this visit.  He denies any abdominal pain, itching or skin rash or fatigue.  He denies any increased abdominal girth or lower extremity edema.  He denies any fevers or chills, cough or shortness of breath.  He denies any nausea or vomiting, diarrhea or constipation.  His appetite has been good, and his weight has increased a bit.  He initially did lose quite a bit of weight when he was diagnosed with diabetes but now that he is on insulin his weight has been increasing.  He has not had any gastrointestinal bleeding or any overt signs of hepatic encephalopathy.     Current Outpatient Prescriptions   Medication     albuterol (PROAIR HFA, PROVENTIL HFA, VENTOLIN HFA) 108 (90 BASE) MCG/ACT inhaler     aspirin 81 MG tablet     blood glucose (NO BRAND SPECIFIED) lancets standard     blood glucose monitoring (NO BRAND SPECIFIED) meter device kit     blood glucose monitoring (ONETOUCH VERIO IQ) test strip     Continuous Blood Gluc  (FREESTYLE CARI READER) MIKAELA     continuous blood glucose monitoring (FREESTYLE CARI) sensor     Ferrous Sulfate (IRON SUPPLEMENT PO)     glucagon (GLUCAGON EMERGENCY) 1 MG kit     insulin aspart  (NOVOLOG FLEXPEN) 100 UNIT/ML injection     insulin glargine (LANTUS SOLOSTAR) 100 UNIT/ML injection     insulin pen needle (B-D U/F) 31G X 5 MM     insulin pen needle 31G X 6 MM     insulin pen needle 32G X 4 MM     Multiple Vitamins-Minerals (MULTIVITAMIN ADULT PO)     atorvastatin (LIPITOR) 20 MG tablet     No current facility-administered medications for this visit.      B/P: 123/84, T: 97.6, P: 62, R: Data Unavailable    GENERAL:  He appears quite healthy.   HEENT:  No scleral icterus or temporal muscle wasting.     CHEST:  Clear.     ABDOMEN:  His abdominal exam shows no increase in girth.  No masses or tenderness to palpation are present.  His liver is 10 cm in span without left lobe enlargement.  No spleen tip is palpable.   EXTREMITIES:  Shows no edema.     SKIN:  No stigmata of chronic liver disease.     NEUROLOGIC:  No asterixis.     Recent Results (from the past 168 hour(s))   Hepatic panel    Collection Time: 04/24/18  9:20 AM   Result Value Ref Range    Bilirubin Direct <0.1 0.0 - 0.2 mg/dL    Bilirubin Total 0.3 0.2 - 1.3 mg/dL    Albumin 3.8 3.4 - 5.0 g/dL    Protein Total 7.1 6.8 - 8.8 g/dL    Alkaline Phosphatase 72 40 - 150 U/L    ALT 37 0 - 70 U/L    AST 26 0 - 45 U/L   CBC with platelets    Collection Time: 04/24/18  9:20 AM   Result Value Ref Range    WBC 4.3 4.0 - 11.0 10e9/L    RBC Count 5.34 4.4 - 5.9 10e12/L    Hemoglobin 14.9 13.3 - 17.7 g/dL    Hematocrit 46.0 40.0 - 53.0 %    MCV 86 78 - 100 fl    MCH 27.9 26.5 - 33.0 pg    MCHC 32.4 31.5 - 36.5 g/dL    RDW 15.4 (H) 10.0 - 15.0 %    Platelet Count 211 150 - 450 10e9/L   INR    Collection Time: 04/24/18  9:20 AM   Result Value Ref Range    INR 0.98 0.86 - 1.14   Basic metabolic panel    Collection Time: 04/24/18  9:20 AM   Result Value Ref Range    Sodium 138 133 - 144 mmol/L    Potassium 4.4 3.4 - 5.3 mmol/L    Chloride 104 94 - 109 mmol/L    Carbon Dioxide 30 20 - 32 mmol/L    Anion Gap 4 3 - 14 mmol/L    Glucose 80 70 - 99 mg/dL     Urea Nitrogen 13 7 - 30 mg/dL    Creatinine 0.64 (L) 0.66 - 1.25 mg/dL    GFR Estimate >90 >60 mL/min/1.7m2    GFR Estimate If Black >90 >60 mL/min/1.7m2    Calcium 9.1 8.5 - 10.1 mg/dL      My impression is that Mr. Brothers is doing very well with normalization of his liver tests on a gluten-free diet.  His iron deficiency anemia has also resolved as well.  While I feel quite comfortable with the diagnosis of his abnormal liver test being celiac disease, I do think we need to keep in the back of our mind the association of autoimmune hepatitis with this.  If his liver tests were to increase while on a gluten-free diet, certainly he would need a liver biopsy.  He did have an ERENDIRA performed today which is pending, however his globulin fraction is normal and thus I do not think it will be of any great significance.      I will see him back in the clinic only as needed.      Thank you very much for allowing me to participate in the care of this patient.  If you have any questions regarding my recommendations, please do not hesitate to contact me.       Michael Becerra MD      Professor of Medicine  [employer]      Executive Medical Director, Solid Organ Transplant Program  [facility]

## 2018-04-24 NOTE — LETTER
4/24/2018       RE: Jake Brothers  [address] [place] [id number]     Dear Colleague,    Thank you for referring your patient, Jake Brothers, to the [facility]. Please see a copy of my visit note below.    HISTORY OF PRESENT ILLNESS:  I had the pleasure of seeing Jake Brothers for followup in Liver Clinic at the [facility] on 04/24/2018.  Mr. Brothers returns for followup of what seems to be quite clear now abnormal liver tests secondary to celiac disease.  When I saw him last he did have abnormal liver tests and had self-diagnosed celiac disease.  He was put on a gluten-free diet and subsequently his TTG test came back positive.  He has continued to be on a gluten-free diet.  I do think since I saw him at his last he was diagnosed with type 1 diabetes.  There is a strong association between type 1 diabetes and celiac disease that in many cases also includes autoimmune hepatitis.  However, his liver tests have remained normal.      He actually feels fairly well at this visit.  He denies any abdominal pain, itching or skin rash or fatigue.  He denies any increased abdominal girth or lower extremity edema.  He denies any fevers or chills, cough or shortness of breath.  He denies any nausea or vomiting, diarrhea or constipation.  His appetite has been good, and his weight has increased a bit.  He initially did lose quite a bit of weight when he was diagnosed with diabetes but now that he is on insulin his weight has been increasing.  He has not had any gastrointestinal bleeding or any overt signs of hepatic encephalopathy.     Current Outpatient Prescriptions   Medication     albuterol (PROAIR HFA, PROVENTIL HFA, VENTOLIN HFA) 108 (90 BASE) MCG/ACT inhaler     aspirin 81 MG tablet     blood glucose (NO BRAND SPECIFIED) lancets standard     blood glucose monitoring (NO BRAND SPECIFIED) meter device kit     blood  glucose monitoring (ONETOUCH VERIO IQ) test strip     Continuous Blood Gluc  (FREESTYLE CARI READER) MIKAELA     continuous blood glucose monitoring (FREESTYLE CARI) sensor     Ferrous Sulfate (IRON SUPPLEMENT PO)     glucagon (GLUCAGON EMERGENCY) 1 MG kit     insulin aspart (NOVOLOG FLEXPEN) 100 UNIT/ML injection     insulin glargine (LANTUS SOLOSTAR) 100 UNIT/ML injection     insulin pen needle (B-D U/F) 31G X 5 MM     insulin pen needle 31G X 6 MM     insulin pen needle 32G X 4 MM     Multiple Vitamins-Minerals (MULTIVITAMIN ADULT PO)     atorvastatin (LIPITOR) 20 MG tablet     No current facility-administered medications for this visit.      B/P: 123/84, T: 97.6, P: 62, R: Data Unavailable    GENERAL:  He appears quite healthy.   HEENT:  No scleral icterus or temporal muscle wasting.     CHEST:  Clear.     ABDOMEN:  His abdominal exam shows no increase in girth.  No masses or tenderness to palpation are present.  His liver is 10 cm in span without left lobe enlargement.  No spleen tip is palpable.   EXTREMITIES:  Shows no edema.     SKIN:  No stigmata of chronic liver disease.     NEUROLOGIC:  No asterixis.     Recent Results (from the past 168 hour(s))   Hepatic panel    Collection Time: 04/24/18  9:20 AM   Result Value Ref Range    Bilirubin Direct <0.1 0.0 - 0.2 mg/dL    Bilirubin Total 0.3 0.2 - 1.3 mg/dL    Albumin 3.8 3.4 - 5.0 g/dL    Protein Total 7.1 6.8 - 8.8 g/dL    Alkaline Phosphatase 72 40 - 150 U/L    ALT 37 0 - 70 U/L    AST 26 0 - 45 U/L   CBC with platelets    Collection Time: 04/24/18  9:20 AM   Result Value Ref Range    WBC 4.3 4.0 - 11.0 10e9/L    RBC Count 5.34 4.4 - 5.9 10e12/L    Hemoglobin 14.9 13.3 - 17.7 g/dL    Hematocrit 46.0 40.0 - 53.0 %    MCV 86 78 - 100 fl    MCH 27.9 26.5 - 33.0 pg    MCHC 32.4 31.5 - 36.5 g/dL    RDW 15.4 (H) 10.0 - 15.0 %    Platelet Count 211 150 - 450 10e9/L   INR    Collection Time: 04/24/18  9:20 AM   Result Value Ref Range    INR 0.98 0.86 - 1.14    Basic metabolic panel    Collection Time: 04/24/18  9:20 AM   Result Value Ref Range    Sodium 138 133 - 144 mmol/L    Potassium 4.4 3.4 - 5.3 mmol/L    Chloride 104 94 - 109 mmol/L    Carbon Dioxide 30 20 - 32 mmol/L    Anion Gap 4 3 - 14 mmol/L    Glucose 80 70 - 99 mg/dL    Urea Nitrogen 13 7 - 30 mg/dL    Creatinine 0.64 (L) 0.66 - 1.25 mg/dL    GFR Estimate >90 >60 mL/min/1.7m2    GFR Estimate If Black >90 >60 mL/min/1.7m2    Calcium 9.1 8.5 - 10.1 mg/dL      My impression is that Mr. Brothers is doing very well with normalization of his liver tests on a gluten-free diet.  His iron deficiency anemia has also resolved as well.  While I feel quite comfortable with the diagnosis of his abnormal liver test being celiac disease, I do think we need to keep in the back of our mind the association of autoimmune hepatitis with this.  If his liver tests were to increase while on a gluten-free diet, certainly he would need a liver biopsy.  He did have an ERENDIRA performed today which is pending, however his globulin fraction is normal and thus I do not think it will be of any great significance.      I will see him back in the clinic only as needed.      Thank you very much for allowing me to participate in the care of this patient.  If you have any questions regarding my recommendations, please do not hesitate to contact me.       Michael Becerra MD      Professor of Medicine  [employer]      Executive Medical Director, Solid Organ Transplant Program  [facility]    Again, thank you for allowing me to participate in the care of your patient.      Sincerely,    Michael Becerra MD

## 2018-04-24 NOTE — NURSING NOTE
Chief Complaint   Patient presents with     RECHECK     Elevated Liver Enzymes/Hepatic Steatotis   Pt roomed, vitals, meds, and allergies reviewed with pt. Pt ready for provider.  Mynor Pizarro, CMA

## 2018-04-24 NOTE — LETTER
4/24/2018      RE: aJke Brothers  [address] [facility] [id number]       HISTORY OF PRESENT ILLNESS:  I had the pleasure of seeing Jake Brothers for followup in Liver Clinic at the [facility] on 04/24/2018.  Mr. Brothers returns for followup of what seems to be quite clear now abnormal liver tests secondary to celiac disease.  When I saw him last he did have abnormal liver tests and had self-diagnosed celiac disease.  He was put on a gluten-free diet and subsequently his TTG test came back positive.  He has continued to be on a gluten-free diet.  I do think since I saw him at his last he was diagnosed with type 1 diabetes.  There is a strong association between type 1 diabetes and celiac disease that in many cases also includes autoimmune hepatitis.  However, his liver tests have remained normal.      He actually feels fairly well at this visit.  He denies any abdominal pain, itching or skin rash or fatigue.  He denies any increased abdominal girth or lower extremity edema.  He denies any fevers or chills, cough or shortness of breath.  He denies any nausea or vomiting, diarrhea or constipation.  His appetite has been good, and his weight has increased a bit.  He initially did lose quite a bit of weight when he was diagnosed with diabetes but now that he is on insulin his weight has been increasing.  He has not had any gastrointestinal bleeding or any overt signs of hepatic encephalopathy.     Current Outpatient Prescriptions   Medication     albuterol (PROAIR HFA, PROVENTIL HFA, VENTOLIN HFA) 108 (90 BASE) MCG/ACT inhaler     aspirin 81 MG tablet     blood glucose (NO BRAND SPECIFIED) lancets standard     blood glucose monitoring (NO BRAND SPECIFIED) meter device kit     blood glucose monitoring (ONETOUCH VERIO IQ) test strip     Continuous Blood Gluc  (FREESTYLE CARI READER) MIKAELA     continuous blood glucose monitoring (FREESTYLE CARI) sensor     Ferrous  Sulfate (IRON SUPPLEMENT PO)     glucagon (GLUCAGON EMERGENCY) 1 MG kit     insulin aspart (NOVOLOG FLEXPEN) 100 UNIT/ML injection     insulin glargine (LANTUS SOLOSTAR) 100 UNIT/ML injection     insulin pen needle (B-D U/F) 31G X 5 MM     insulin pen needle 31G X 6 MM     insulin pen needle 32G X 4 MM     Multiple Vitamins-Minerals (MULTIVITAMIN ADULT PO)     atorvastatin (LIPITOR) 20 MG tablet     No current facility-administered medications for this visit.      B/P: 123/84, T: 97.6, P: 62, R: Data Unavailable    GENERAL:  He appears quite healthy.   HEENT:  No scleral icterus or temporal muscle wasting.     CHEST:  Clear.     ABDOMEN:  His abdominal exam shows no increase in girth.  No masses or tenderness to palpation are present.  His liver is 10 cm in span without left lobe enlargement.  No spleen tip is palpable.   EXTREMITIES:  Shows no edema.     SKIN:  No stigmata of chronic liver disease.     NEUROLOGIC:  No asterixis.     Recent Results (from the past 168 hour(s))   Hepatic panel    Collection Time: 04/24/18  9:20 AM   Result Value Ref Range    Bilirubin Direct <0.1 0.0 - 0.2 mg/dL    Bilirubin Total 0.3 0.2 - 1.3 mg/dL    Albumin 3.8 3.4 - 5.0 g/dL    Protein Total 7.1 6.8 - 8.8 g/dL    Alkaline Phosphatase 72 40 - 150 U/L    ALT 37 0 - 70 U/L    AST 26 0 - 45 U/L   CBC with platelets    Collection Time: 04/24/18  9:20 AM   Result Value Ref Range    WBC 4.3 4.0 - 11.0 10e9/L    RBC Count 5.34 4.4 - 5.9 10e12/L    Hemoglobin 14.9 13.3 - 17.7 g/dL    Hematocrit 46.0 40.0 - 53.0 %    MCV 86 78 - 100 fl    MCH 27.9 26.5 - 33.0 pg    MCHC 32.4 31.5 - 36.5 g/dL    RDW 15.4 (H) 10.0 - 15.0 %    Platelet Count 211 150 - 450 10e9/L   INR    Collection Time: 04/24/18  9:20 AM   Result Value Ref Range    INR 0.98 0.86 - 1.14   Basic metabolic panel    Collection Time: 04/24/18  9:20 AM   Result Value Ref Range    Sodium 138 133 - 144 mmol/L    Potassium 4.4 3.4 - 5.3 mmol/L    Chloride 104 94 - 109 mmol/L     Carbon Dioxide 30 20 - 32 mmol/L    Anion Gap 4 3 - 14 mmol/L    Glucose 80 70 - 99 mg/dL    Urea Nitrogen 13 7 - 30 mg/dL    Creatinine 0.64 (L) 0.66 - 1.25 mg/dL    GFR Estimate >90 >60 mL/min/1.7m2    GFR Estimate If Black >90 >60 mL/min/1.7m2    Calcium 9.1 8.5 - 10.1 mg/dL      My impression is that Mr. Brothers is doing very well with normalization of his liver tests on a gluten-free diet.  His iron deficiency anemia has also resolved as well.  While I feel quite comfortable with the diagnosis of his abnormal liver test being celiac disease, I do think we need to keep in the back of our mind the association of autoimmune hepatitis with this.  If his liver tests were to increase while on a gluten-free diet, certainly he would need a liver biopsy.  He did have an ERENDIRA performed today which is pending, however his globulin fraction is normal and thus I do not think it will be of any great significance.      I will see him back in the clinic only as needed.      Thank you very much for allowing me to participate in the care of this patient.  If you have any questions regarding my recommendations, please do not hesitate to contact me.       Michael Becerra MD      Professor of Medicine  [employer]      Executive Medical Director, Solid Organ Transplant Program  [facility]

## 2018-04-25 LAB — ANA SER QL IF: NEGATIVE

## 2018-04-26 LAB — SMA IGG SER-ACNC: 8 UNITS (ref 0–19)

## 2018-05-14 ENCOUNTER — OFFICE VISIT (OUTPATIENT)
Dept: FAMILY MEDICINE | Facility: CLINIC | Age: 43
End: 2018-05-14
Payer: COMMERCIAL

## 2018-05-14 VITALS
DIASTOLIC BLOOD PRESSURE: 78 MMHG | HEART RATE: 80 BPM | BODY MASS INDEX: 27.84 KG/M2 | HEIGHT: 70 IN | OXYGEN SATURATION: 98 % | RESPIRATION RATE: 12 BRPM | TEMPERATURE: 98.4 F | WEIGHT: 194.5 LBS | SYSTOLIC BLOOD PRESSURE: 120 MMHG

## 2018-05-14 DIAGNOSIS — E10.9 TYPE 1 DIABETES MELLITUS WITHOUT COMPLICATION (H): Primary | ICD-10-CM

## 2018-05-14 LAB — HBA1C MFR BLD: 8.1 % (ref 0–5.6)

## 2018-05-14 PROCEDURE — 36415 COLL VENOUS BLD VENIPUNCTURE: CPT | Performed by: FAMILY MEDICINE

## 2018-05-14 PROCEDURE — 99214 OFFICE O/P EST MOD 30 MIN: CPT | Performed by: FAMILY MEDICINE

## 2018-05-14 PROCEDURE — 83036 HEMOGLOBIN GLYCOSYLATED A1C: CPT | Performed by: FAMILY MEDICINE

## 2018-05-14 NOTE — MR AVS SNAPSHOT
After Visit Summary   5/14/2018    Jake Brothers    MRN: [id number]           Patient Information     Date Of Birth          1975        Visit Information        Provider Department      5/14/2018 10:20 AM Caden Mitchell MD [facility]        Today's Diagnoses     Type 1 diabetes mellitus without complication (H)    -  1       Follow-ups after your visit        Your next 10 appointments already scheduled     Jul 11, 2018  3:00 PM CDT   (Arrive by 2:45 PM)   RETURN DIABETES with Alejandra Olguin MD   [facility] ([facility])    [address] [place] [id number] [phone]              Who to contact     If you have questions or need follow up information about today's clinic visit or your schedule please contact [facility] directly at [phone].  Normal or non-critical lab and imaging results will be communicated to you by MyChart, letter or phone within 4 business days after the clinic has received the results. If you do not hear from us within 7 days, please contact the clinic through [employer] or phone. If you have a critical or abnormal lab result, we will notify you by phone as soon as possible.  Submit refill requests through [employer] or call your pharmacy and they will forward the refill request to us. Please allow 3 business days for your refill to be completed.          Additional Information About Your Visit        MyChart Information     [employer] gives you secure access to your electronic health record. If you see a primary care provider, you can also send messages to your care team and make appointments. If you have questions, please call your primary care clinic.  If you do not have a primary care provider, please call [phone] and they will assist you.        Care EveryWhere ID     This is your Care EveryWhere ID. This could be used by other organizations to access your  "[facility] medical records  [id number]        Your Vitals Were     Pulse Temperature Respirations Height Pulse Oximetry BMI (Body Mass Index)    80 98.4  F (36.9  C) (Oral) 12 5' 10\" (1.778 m) 98% 27.91 kg/m2       Blood Pressure from Last 3 Encounters:   05/14/18 120/78   04/24/18 123/84   04/02/18 108/74    Weight from Last 3 Encounters:   05/14/18 194 lb 8 oz (88.2 kg)   04/24/18 200 lb (90.7 kg)   04/02/18 195 lb 12 oz (88.8 kg)              We Performed the Following     Hemoglobin A1c          Today's Medication Changes          These changes are accurate as of 5/14/18 12:27 PM.  If you have any questions, ask your nurse or doctor.               These medicines have changed or have updated prescriptions.        Dose/Directions    insulin glargine 100 UNIT/ML injection   Commonly known as:  LANTUS SOLOSTAR   This may have changed:    - how much to take  - additional instructions   Used for:  Uncontrolled type 2 diabetes mellitus with insulin therapy (H)        Dose:  25 Units   Inject 25 Units Subcutaneous At Bedtime Go up by 2 units weekly until fasting blood sugars are .   Quantity:  15 mL   Refills:  11                Primary Care Provider Office Phone # Fax #    Caden Ilya Mitchell [phone] [address] [place], [place] [address]        Equal Access to Services     MARZENA BROCK AH: Liss Qureshi, christian cooley, qachristen sawant . So [facility] [phone].    ATENCIÓN: Si habla español, tiene a bender disposición servicios gratuitos de asistencia lingüística. Sangita al [phone].    We comply with applicable federal civil rights laws and [place] laws. We do not discriminate on the basis of race, color, national origin, age, disability, sex, sexual orientation, or gender identity.            Thank you!     Thank you for choosing [facility]  for your care. Our goal is always to provide you with " excellent care. Hearing back from our patients is one way we can continue to improve our services. Please take a few minutes to complete the written survey that you may receive in the mail after your visit with us. Thank you!             Your Updated Medication List - Protect others around you: Learn how to safely use, store and throw away your medicines at www.disposemymeds.org.          This list is accurate as of 5/14/18 12:27 PM.  Always use your most recent med list.                   Brand Name Dispense Instructions for use Diagnosis    albuterol 108 (90 Base) MCG/ACT Inhaler    PROAIR HFA/PROVENTIL HFA/VENTOLIN HFA    1 Inhaler    Inhale 2 puffs into the lungs every 6 hours as needed for shortness of breath / dyspnea or wheezing    Cough       aspirin 81 MG tablet     30 tablet    Take 1 tablet (81 mg) by mouth daily        atorvastatin 20 MG tablet    LIPITOR    90 tablet    Take 1 tablet (20 mg) by mouth daily    Uncontrolled type 1 diabetes mellitus without complication (H)       blood glucose lancets standard    no brand specified    200 each    Use to test blood sugar 6 times daily or as directed.    Uncontrolled type 2 diabetes mellitus with insulin therapy (H), Uncontrolled type 1 diabetes mellitus without complication (H)       blood glucose monitoring meter device kit    no brand specified    1 kit    Use to test blood sugar 2 times daily or as directed.    Uncontrolled type 2 diabetes mellitus with insulin therapy (H)       blood glucose monitoring test strip    ONETOUCH VERIO IQ    300 strip    Use to test blood sugars 10 times daily or as directed.    Uncontrolled type 1 diabetes mellitus without complication (H), Uncontrolled type 2 diabetes mellitus with insulin therapy (H)       continuous blood glucose monitoring sensor     3 each    For use with Freestyle Sukhi Flash  for continuous monitioring of blood glucose levels. Replace sensor every 10 days.    Type 1 diabetes mellitus with  hyperglycemia (H)       FREESTYLE CARI READER Alley     1 Device    1 each daily    Type 1 diabetes mellitus with hyperglycemia (H)       glucagon 1 MG kit    GLUCAGON EMERGENCY    1 mg    Inject 1 mg into the muscle once for 1 dose    Uncontrolled type 1 diabetes mellitus without complication (H)       insulin glargine 100 UNIT/ML injection    LANTUS SOLOSTAR    15 mL    Inject 25 Units Subcutaneous At Bedtime Go up by 2 units weekly until fasting blood sugars are .    Uncontrolled type 2 diabetes mellitus with insulin therapy (H)       * insulin pen needle 31G X 6 MM     150 each    Use up to 5 daily or as directed.    Uncontrolled type 2 diabetes mellitus with insulin therapy (H)       * insulin pen needle 31G X 5 MM    B-D U/F    150 each    Use 5  time(s) per day.    Uncontrolled type 1 diabetes mellitus without complication (H)       * insulin pen needle 32G X 4 MM     200 each    Use 6 pen needles daily or as directed.    Uncontrolled type 2 diabetes mellitus with insulin therapy (H), Uncontrolled type 1 diabetes mellitus without complication (H)       IRON SUPPLEMENT PO      Take 2 tablets by mouth daily        MULTIVITAMIN ADULT PO           NovoLOG FLEXPEN 100 UNIT/ML injection   Generic drug:  insulin aspart     15 mL    1 unit per 15 grams of carbohydrates + correction with meals  TID  approx  25 units daily Max    Uncontrolled type 1 diabetes mellitus without complication (H)       * Notice:  This list has 3 medication(s) that are the same as other medications prescribed for you. Read the directions carefully, and ask your doctor or other care provider to review them with you.

## 2018-05-14 NOTE — PROGRESS NOTES
SUBJECTIVE:   Jake Brothers is a 43 year old male who presents to clinic today for the following health issues:      Diabetes Follow-up      Patient is checking blood sugars: 7-20 times a day ranging average 115 and     Diabetic concerns: blood sugar frequently over 200 and low blood sugar several less than 70 in the past few weeks     Symptoms of hypoglycemia (low blood sugar): shaky, dizzy, weak, lethargy, blurred vision, confusion     Paresthesias (numbness or burning in feet) or sores: Yes numbness in right foot      Date of last diabetic eye exam: not sure     BP Readings from Last 2 Encounters:   04/24/18 123/84   04/02/18 108/74     Hemoglobin A1C (%)   Date Value   05/14/2018 8.1 (H)   03/14/2018 >15.0 (H)     LDL Cholesterol Calculated (mg/dL)   Date Value   03/05/2018 108 (H)   08/16/2016 52       Amount of exercise or physical activity: 6-7 days/week for an average of 45-60 minutes    Problems taking medications regularly: No    Medication side effects: none    Diet: regular (no restrictions)    Last 5 days - waking up in 80s and dropping down to 50s blood sugar.  Today and yesterday and woke up with 180s.   No change in lantus dose 21 units. Using it in the evening.   novolog - not needing much. Around 4-5 units per day and some days not needing to use at all. Had one episode of 300 blood sugar.    Going to see endocrine in July.     Problem list and histories reviewed & adjusted, as indicated.  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 No     Caffeine Concern Not Asked     2 cups of coffee in am     Exercise Not Asked     Rides his bike everywhere 3 to 4 times a week     Social History Narrative    ** Merged History Encounter **         Balanced Diet - Yes    Osteoporosis Preventative measures-  Pt does not eat diary    Regular Exercise -  Yes Describe bike every where he goes    Dental Exam up - NO    Eye Exam - YES - Date: yearly    Self Testicular Exam -  No    Do you have any concerns about STD's -  No    Abuse: Current or Past (Physical, Sexual or Emotional)- No    Do you feel safe in your environment - Yes    Guns stored in the home - No    Sunscreen used - No    Seatbelts used - Yes    Lipids - NO    Glucose -  NO    Colon Cancer Screening - No    Hemoccults - NO    PSA - NO    Digital Rectal Exam - NO    Immunizations reviewed and up to date - Yes.Meagan Soliz MA    Pt had a tetanus shot in the past three years. But he is not sure.             No Known Allergies  Patient Active Problem List   Diagnosis     Obesity     CARDIOVASCULAR SCREENING; LDL GOAL LESS THAN 160     Eczema     Elevated LFTs     Cervicalgia     Hepatic steatosis     Celiac disease     Vitiligo     Iron deficiency     Type 1 diabetes mellitus without complication (H)     Reviewed medications, social history and  past medical and surgical history.    Review of system: for general, respiratory, CVS, GI and psychiatry negative except for noted above.     EXAM:  /78 (Cuff Size: Adult Regular)  Pulse 80  Temp 98.4  F (36.9  C) (Oral)  Resp 12  Ht 5' 10\" (1.778 m)  Wt 194 lb 8 oz (88.2 kg)  SpO2 98%  BMI 27.91 kg/m2  Constitutional: healthy, alert and no distress "   Psychiatric: mentation appears normal and affect normal/bright       ASSESSMENT / PLAN:  (E10.9) Type 1 diabetes mellitus without complication (H)  (primary encounter diagnosis)  Comment: somewhat labile blood sugars. Some low blood sugars. Discussed he should contact endocrine over phone and seek their input if he can not get in to see them. We discussed cut down lantus to 20 units and use novolog as per sliding scale if needed while he is waiting to hear back from endocrine.   -He is requesting intermittently of absence due to his possible hypoglycemia and need to see a specialist and diabetic educator.  I have filled out his FMLA for intermittent leave of absence for another 2 months.  We discussed that for ongoing leave of absence should be evaluated by endocrinologist and he should asked him to fill out FMLA as per their assessment. He agreed. .  Plan: Hemoglobin A1c

## 2018-07-20 ENCOUNTER — MYC MEDICAL ADVICE (OUTPATIENT)
Dept: EDUCATION SERVICES | Facility: CLINIC | Age: 43
End: 2018-07-20

## 2018-07-20 DIAGNOSIS — E10.65 TYPE 1 DIABETES MELLITUS WITH HYPERGLYCEMIA (H): ICD-10-CM

## 2018-07-20 DIAGNOSIS — E10.9 TYPE 1 DIABETES MELLITUS WITHOUT COMPLICATION (H): Primary | ICD-10-CM

## 2018-07-23 RX ORDER — FLASH GLUCOSE SENSOR
KIT MISCELLANEOUS
Qty: 9 EACH | Refills: 3 | Status: SHIPPED | OUTPATIENT
Start: 2018-07-23 | End: 2018-07-25

## 2018-07-25 DIAGNOSIS — E10.65 TYPE 1 DIABETES MELLITUS WITH HYPERGLYCEMIA (H): ICD-10-CM

## 2018-07-25 RX ORDER — FLASH GLUCOSE SENSOR
KIT MISCELLANEOUS
Qty: 3 EACH | Refills: 11 | Status: SHIPPED | OUTPATIENT
Start: 2018-07-25 | End: 2020-01-02

## 2018-08-01 RX ORDER — FLASH GLUCOSE SENSOR
KIT MISCELLANEOUS
Qty: 3 EACH | Refills: 11 | Status: SHIPPED | OUTPATIENT
Start: 2018-08-01 | End: 2019-01-08

## 2018-09-27 ENCOUNTER — OFFICE VISIT (OUTPATIENT)
Dept: ENDOCRINOLOGY | Facility: CLINIC | Age: 43
End: 2018-09-27
Payer: COMMERCIAL

## 2018-09-27 VITALS
HEART RATE: 80 BPM | SYSTOLIC BLOOD PRESSURE: 139 MMHG | BODY MASS INDEX: 29.22 KG/M2 | DIASTOLIC BLOOD PRESSURE: 77 MMHG | WEIGHT: 204.1 LBS | HEIGHT: 70 IN

## 2018-09-27 DIAGNOSIS — E10.9 TYPE 1 DIABETES MELLITUS WITHOUT COMPLICATION (H): Primary | ICD-10-CM

## 2018-09-27 LAB — HBA1C MFR BLD: 6.4 % (ref 4.3–6)

## 2018-09-27 NOTE — MR AVS SNAPSHOT
After Visit Summary   9/27/2018    Jake Brothers    MRN: [id number]           Patient Information     Date Of Birth          1975        Visit Information        Provider Department      9/27/2018 11:30 AM Bety Dacosta PA-C [facility]        Today's Diagnoses     Type 1 diabetes mellitus without complication (H)    -  1    Uncontrolled type 2 diabetes mellitus with insulin therapy (H)          Care Instructions    1. Reduce Lantus 23 units subcutaneous at bedtime.  2. No change in meal time insulin doses today.  3.  Please have fasting labs done.  4.  Please have the flu shot done.  5. See me in 3 months.  Bety Dacosta PA-C          Follow-ups after your visit        Your next 10 appointments already scheduled     Jan 03, 2019 11:30 AM CST   (Arrive by 11:15 AM)   RETURN DIABETES with ARIANE Vasquez [facility] ([facility])    [address] [place] [id number] [phone]              Future tests that were ordered for you today     Open Future Orders        Priority Expected Expires Ordered    Albumin Random Urine Quantitative with Creat Ratio Routine 9/28/2018 12/1/2018 9/27/2018    Creatinine Routine 9/28/2018 12/1/2018 9/27/2018    Lipid Profile Routine 9/28/2018 12/1/2018 9/27/2018    Potassium Routine 9/28/2018 12/1/2018 9/27/2018    AST Routine 9/28/2018 12/1/2018 9/27/2018    ALT Routine 9/28/2018 12/1/2018 9/27/2018    TSH Routine 9/28/2018 12/1/2018 9/27/2018            Who to contact     Please call your clinic at [phone] to:    Ask questions about your health    Make or cancel appointments    Discuss your medicines    Learn about your test results    Speak to your doctor            Additional Information About Your Visit        [employer] Information     [employer] gives you secure access to your electronic health record. If you see a primary care provider, you can also send messages to  "your care team and make appointments. If you have questions, please call your primary care clinic.  If you do not have a primary care provider, please call [phone] and they will assist you.      [employer] is an electronic gateway that provides easy, online access to your medical records. With [employer], you can request a clinic appointment, read your test results, renew a prescription or communicate with your care team.     To access your existing account, please contact your [facility] or call [phone] for assistance.        Care EveryWhere ID     This is your Care EveryWhere ID. This could be used by other organizations to access your [facility] medical records  [id number]        Your Vitals Were     Pulse Height BMI (Body Mass Index)             80 1.778 m (5' 10\") 29.29 kg/m2          Blood Pressure from Last 3 Encounters:   09/27/18 139/77   05/14/18 120/78   04/24/18 123/84    Weight from Last 3 Encounters:   09/27/18 92.6 kg (204 lb 1.6 oz)   05/14/18 88.2 kg (194 lb 8 oz)   04/24/18 90.7 kg (200 lb)              We Performed the Following     Hemoglobin A1c POCT          Today's Medication Changes          These changes are accurate as of 9/27/18 11:58 AM.  If you have any questions, ask your nurse or doctor.               These medicines have changed or have updated prescriptions.        Dose/Directions    LANTUS SOLOSTAR 100 UNIT/ML injection   This may have changed:    - how much to take  - how to take this  - when to take this  - additional instructions   Used for:  Uncontrolled type 2 diabetes mellitus with insulin therapy (H)   Generic drug:  insulin glargine   Changed by:  Bety Dacosta PA-C        Inject 23 units subcutaneous at bedtime.   Quantity:  15 mL   Refills:  11         Stop taking these medicines if you haven't already. Please contact your care team if you have questions.     IRON SUPPLEMENT PO   Stopped by:  Bety Dacosta PA-C           "          Primary Care Provider Office Phone # Fax #    Caden Ilya Mitchell [phone] [address] [facility] [address]        Equal Access to Services     MARZENA BROCK : Liss anjelica bauman ovidio Qureshi, wakalida luqadaha, qaybta kaalmada jimmie, christen macias laArpangilberto rodriguez. So [facility] [phone].    ATENCIÓN: Si habla español, tiene a bender disposición servicios gratuitos de asistencia lingüística. Llame al [phone].    We comply with applicable federal civil rights laws and [place] laws. We do not discriminate on the basis of race, color, national origin, age, disability, sex, sexual orientation, or gender identity.            Thank you!     Thank you for choosing [facility]  for your care. Our goal is always to provide you with excellent care. Hearing back from our patients is one way we can continue to improve our services. Please take a few minutes to complete the written survey that you may receive in the mail after your visit with us. Thank you!             Your Updated Medication List - Protect others around you: Learn how to safely use, store and throw away your medicines at www.disposemymeds.org.          This list is accurate as of 9/27/18 11:58 AM.  Always use your most recent med list.                   Brand Name Dispense Instructions for use Diagnosis    albuterol 108 (90 Base) MCG/ACT inhaler    PROAIR HFA/PROVENTIL HFA/VENTOLIN HFA    1 Inhaler    Inhale 2 puffs into the lungs every 6 hours as needed for shortness of breath / dyspnea or wheezing    Cough       aspirin 81 MG tablet     30 tablet    Take 1 tablet (81 mg) by mouth daily        atorvastatin 20 MG tablet    LIPITOR    90 tablet    Take 1 tablet (20 mg) by mouth daily    Uncontrolled type 1 diabetes mellitus without complication (H)       blood glucose lancets standard    no brand specified    200 each    Use to test blood sugar 6 times daily or as directed.    Uncontrolled type  2 diabetes mellitus with insulin therapy (H), Uncontrolled type 1 diabetes mellitus without complication (H)       blood glucose monitoring meter device kit    no brand specified    1 kit    Use to test blood sugar 2 times daily or as directed.    Uncontrolled type 2 diabetes mellitus with insulin therapy (H)       blood glucose monitoring test strip    ONETOUCH VERIO IQ    300 strip    Use to test blood sugars 10 times daily or as directed.    Uncontrolled type 1 diabetes mellitus without complication (H), Uncontrolled type 2 diabetes mellitus with insulin therapy (H)       * continuous blood glucose monitoring sensor     3 each    For use with Freestyle Sukhi Flash  . Replace sensor every 10 days.    Type 1 diabetes mellitus with hyperglycemia (H)       * continuous blood glucose monitoring sensor     3 each    For use with Freestyle Sukhi Flash  for continuous monitioring of blood glucose levels. Replace sensor every 10 days.    Type 1 diabetes mellitus without complication (H)       FREESTYLE SUKHI READER Alley     1 Device    1 each daily    Type 1 diabetes mellitus with hyperglycemia (H)       glucagon 1 MG kit    GLUCAGON EMERGENCY    1 mg    Inject 1 mg into the muscle once for 1 dose    Uncontrolled type 1 diabetes mellitus without complication (H)       * insulin pen needle 31G X 6 MM     150 each    Use up to 5 daily or as directed.    Uncontrolled type 2 diabetes mellitus with insulin therapy (H)       * insulin pen needle 31G X 5 MM    B-D U/F    150 each    Use 5  time(s) per day.    Uncontrolled type 1 diabetes mellitus without complication (H)       * insulin pen needle 32G X 4 MM     200 each    Use 6 pen needles daily or as directed.    Uncontrolled type 2 diabetes mellitus with insulin therapy (H), Uncontrolled type 1 diabetes mellitus without complication (H)       LANTUS SOLOSTAR 100 UNIT/ML injection   Generic drug:  insulin glargine     15 mL    Inject 23 units subcutaneous at  bedtime.    Uncontrolled type 2 diabetes mellitus with insulin therapy (H)       MULTIVITAMIN ADULT PO           NovoLOG FLEXPEN 100 UNIT/ML injection   Generic drug:  insulin aspart     15 mL    1 unit per 10 grams of carbohydrates + correction with meals  TID  approx  25 units daily Max    Uncontrolled type 1 diabetes mellitus without complication (H)       * Notice:  This list has 5 medication(s) that are the same as other medications prescribed for you. Read the directions carefully, and ask your doctor or other care provider to review them with you.

## 2018-09-27 NOTE — PATIENT INSTRUCTIONS
1. Reduce Lantus 23 units subcutaneous at bedtime.  2. No change in meal time insulin doses today.  3.  Please have fasting labs done.  4.  Please have the flu shot done.  5. See me in 3 months.  Bety Dacosta PA-C

## 2018-09-27 NOTE — NURSING NOTE
Chief Complaint   Patient presents with     RECHECK     Type I Diabetes     Performed capillary puncture for A1C testing. Patient tolerated well.    Mookie Boyd, [facility]  Endocrinology & Diabetes

## 2018-09-27 NOTE — PROGRESS NOTES
HPI  Jake Brothers is a 43 year old male with newly dx ( March 2018 ) type 1 diabetes mellitus here today for a follow-up visit.  Jake was diagnosed with type 1 diabetes mellitus in March 2018.  Pt's paternal uncle has hx of type 1 diabetes.  Jake's history is significant for celiac, vitiligo, obesity and hx of hepatic steatosis.  Pt has no known history of retinopathy, nephropathy or neuropathy.  For his diabetes, he is currently taking Lantus 24 units SQ at bedtime and NovoLog 1 unit / 10 gms CHO with meals and snacks with correction insulin ( 1 unit/50 for BG > 150 ).  Patient's A1C is 6.4 % today.  His previous A1C was > 15 % in March 2018.  We downloaded his Sukhi device today and his blood sugar values are good.  His fasting blood sugars have been in the 78 - 110 range.  His premeal blood sugars are often < 150.  No documented blood sugar values less than 70.  His FBS values have been in the 70-80 range most days.  On ROS today, patient denies frequent headaches, blurred vision, nausea, vomiting, shortness of breath at rest or chronic cough.  He denies chest pain, abdominal pain, diarrhea, dysuria, hematuria, or foot ulcers.  Patient has no numbness tingling or pain in his toes or fingers suggestive of neuropathy.  He continues to follow a gluten-free diet without diarrhea.  He has been walking the dog and biking.  His weight is up over all, but he plans to work on his diet and increase his activity.    Diabetes Care  Retinopathy:none; pt seen by Oph in 3/2018.  Nephropathy:none; urine microalbuminuria was + at time when his A1C was > 15 %. Will recheck his urine microalbuminuria.  Neuropathy:none.  Foot Exam: no ulcers.  Taking aspirin: yes.  Lipids: - this was when his A1C was > 15 %.  We will plan to recheck his fasting lipid panel.  He is currently taking Lipitor daily.    ROS  Please see under history of present illness.    Allergies  No Known Allergies    Medications  Current Outpatient  Prescriptions   Medication Sig Dispense Refill     albuterol (PROAIR HFA, PROVENTIL HFA, VENTOLIN HFA) 108 (90 BASE) MCG/ACT inhaler Inhale 2 puffs into the lungs every 6 hours as needed for shortness of breath / dyspnea or wheezing 1 Inhaler 0     aspirin 81 MG tablet Take 1 tablet (81 mg) by mouth daily 30 tablet      atorvastatin (LIPITOR) 20 MG tablet Take 1 tablet (20 mg) by mouth daily 90 tablet 3     blood glucose (NO BRAND SPECIFIED) lancets standard Use to test blood sugar 6 times daily or as directed. 200 each 6     blood glucose monitoring (NO BRAND SPECIFIED) meter device kit Use to test blood sugar 2 times daily or as directed. 1 kit 0     blood glucose monitoring (ONETOUCH VERIO IQ) test strip Use to test blood sugars 10 times daily or as directed. 300 strip 6     Continuous Blood Gluc  (FREESTYLE CARI READER) MIKAELA 1 each daily 1 Device 1     continuous blood glucose monitoring (FREESTYLE CARI) sensor For use with Freestyle Cari Flash  for continuous monitioring of blood glucose levels. Replace sensor every 10 days. 3 each 11     continuous blood glucose monitoring (FREESTYLE CARI) sensor For use with Freestyle Cari Flash  . Replace sensor every 10 days. 3 each 11     insulin aspart (NOVOLOG FLEXPEN) 100 UNIT/ML injection 1 unit per 10 grams of carbohydrates + correction with meals  TID  approx  25 units daily Max 15 mL 11     insulin glargine (LANTUS SOLOSTAR) 100 UNIT/ML pen Inject 23 units subcutaneous at bedtime. 15 mL 11     insulin pen needle (B-D U/F) 31G X 5 MM Use 5  time(s) per day. 150 each 11     insulin pen needle 31G X 6 MM Use up to 5 daily or as directed. 150 each 11     insulin pen needle 32G X 4 MM Use 6 pen needles daily or as directed. 200 each 11     Multiple Vitamins-Minerals (MULTIVITAMIN ADULT PO)        glucagon (GLUCAGON EMERGENCY) 1 MG kit Inject 1 mg into the muscle once for 1 dose 1 mg 1     [DISCONTINUED] insulin glargine (LANTUS SOLOSTAR) 100  "UNIT/ML injection Inject 25 Units Subcutaneous At Bedtime Go up by 2 units weekly until fasting blood sugars are . (Patient taking differently: Inject 24 Units Subcutaneous At Bedtime Go up by 2 units weekly until fasting blood sugars are .) 15 mL 11       Family History  family history includes Alcohol/Drug in his father; Allergies in his sister; Depression in his father and mother; Diabetes in his paternal uncle and paternal uncle; HEART DISEASE in his father; Hypertension in his father. There is no history of Cancer.    Social History   reports that he has quit smoking. He has never used smokeless tobacco. He reports that he does not drink alcohol or use illicit drugs.     Past Medical History  Past Medical History:   Diagnosis Date     Bronchitis, chronic, simple (H)      Obesity        Past Surgical History:   Procedure Laterality Date     C ORAL SURGERY PROCEDURE  1994    [place] Teeth Extraction       Physical Exam  /77 (BP Location: Right arm, Patient Position: Sitting, Cuff Size: Adult Regular)  Pulse 80  Ht 1.778 m (5' 10\")  Wt 92.6 kg (204 lb 1.6 oz)  BMI 29.29 kg/m2  Body mass index is 29.29 kg/(m^2).    GENERAL : In no apparent distress    RESULTS  Creatinine   Date Value Ref Range Status   04/24/2018 0.64 (L) 0.66 - 1.25 mg/dL Final     GFR Estimate   Date Value Ref Range Status   04/24/2018 >90 >60 mL/min/1.7m2 Final     Comment:     Non  GFR Calc     Hemoglobin A1C   Date Value Ref Range Status   05/14/2018 8.1 (H) 0 - 5.6 % Final     Comment:     Normal <5.7% Prediabetes 5.7-6.4%  Diabetes 6.5% or higher - adopted from ADA   consensus guidelines.       Potassium   Date Value Ref Range Status   04/24/2018 4.4 3.4 - 5.3 mmol/L Final     ALT   Date Value Ref Range Status   04/24/2018 37 0 - 70 U/L Final     AST   Date Value Ref Range Status   04/24/2018 26 0 - 45 U/L Final     TSH   Date Value Ref Range Status   03/05/2018 1.19 0.40 - 4.00 mU/L Final "       Cholesterol   Date Value Ref Range Status   03/05/2018 202 (H) <200 mg/dL Final     Comment:     Desirable:       <200 mg/dl   08/16/2016 89 <200 mg/dL Final     HDL Cholesterol   Date Value Ref Range Status   03/05/2018 25 (L) >39 mg/dL Final   08/16/2016 24 (L) >39 mg/dL Final     LDL Cholesterol Calculated   Date Value Ref Range Status   03/05/2018 108 (H) <100 mg/dL Final     Comment:     Above desirable:  100-129 mg/dl  Borderline High:  130-159 mg/dL  High:             160-189 mg/dL  Very high:       >189 mg/dl     08/16/2016 52 <100 mg/dL Final     Comment:     Desirable:       <100 mg/dl     Triglycerides   Date Value Ref Range Status   03/05/2018 345 (H) <150 mg/dL Final     Comment:     Borderline high:  150-199 mg/dl  High:             200-499 mg/dl  Very high:       >499 mg/dl  Fasting specimen     08/16/2016 63 <150 mg/dL Final     Comment:     Fasting specimen     Cholesterol/HDL Ratio   Date Value Ref Range Status   09/25/2014 4.5 0.0 - 5.0 Final   02/23/2009 6.8 (H) 0.0 - 5.0 Final     A1C   6.4 % today.  A1C   15  % in 3/2018.    ASSESSMENT/PLAN:    1. TYPE 1 DIABETES MELLITUS: Pt dx having type 1 diabetes mellitus in March 2018.  I had him reduce his Lantus 23 units subcutaneous at hs. No change in Novolog I/C ratio.  Pt will see Oph in March of next year.  Will recheck urine microalbuminuria and fasting lipid panel.  His feet are ok.  Pt will recheck his BP at home or work.  He is taking 81 mg ASA daily.  Reminded him to get the flu vaccine this season.    2.  CELIAC: Stable on gluten free diet.    3.  Return to Endocrine Clinic to see me in 3 months and Dr. Olguin in 6 months.

## 2018-09-27 NOTE — LETTER
9/27/2018       RE: Jake Brothers  [address] [place] [id number]     Dear Colleague,    Thank you for referring your patient, Jake Brothers, to the [facility] ENDOCRINOLOGY at [facility]. Please see a copy of my visit note below.    HPI  Jake Brothers is a 43 year old male with newly dx ( March 2018 ) type 1 diabetes mellitus here today for a follow-up visit.  Jake was diagnosed with type 1 diabetes mellitus in March 2018.  Pt's paternal uncle has hx of type 1 diabetes.  Jake's history is significant for celiac, vitiligo, obesity and hx of hepatic steatosis.  Pt has no known history of retinopathy, nephropathy or neuropathy.  For his diabetes, he is currently taking Lantus 24 units SQ at bedtime and NovoLog 1 unit / 10 gms CHO with meals and snacks with correction insulin ( 1 unit/50 for BG > 150 ).  Patient's A1C is 6.4 % today.  His previous A1C was > 15 % in March 2018.  We downloaded his Sukhi device today and his blood sugar values are good.  His fasting blood sugars have been in the 78 - 110 range.  His premeal blood sugars are often < 150.  No documented blood sugar values less than 70.  His FBS values have been in the 70-80 range most days.  On ROS today, patient denies frequent headaches, blurred vision, nausea, vomiting, shortness of breath at rest or chronic cough.  He denies chest pain, abdominal pain, diarrhea, dysuria, hematuria, or foot ulcers.  Patient has no numbness tingling or pain in his toes or fingers suggestive of neuropathy.  He continues to follow a gluten-free diet without diarrhea.  He has been walking the dog and biking.  His weight is up over all, but he plans to work on his diet and increase his activity.    Diabetes Care  Retinopathy:none; pt seen by Oph in 3/2018.  Nephropathy:none; urine microalbuminuria was + at time when his A1C was > 15 %. Will recheck his urine microalbuminuria.  Neuropathy:none.  Foot Exam: no  ulcers.  Taking aspirin: yes.  Lipids: - this was when his A1C was > 15 %.  We will plan to recheck his fasting lipid panel.  He is currently taking Lipitor daily.    ROS  Please see under history of present illness.    Allergies  No Known Allergies    Medications  Current Outpatient Prescriptions   Medication Sig Dispense Refill     albuterol (PROAIR HFA, PROVENTIL HFA, VENTOLIN HFA) 108 (90 BASE) MCG/ACT inhaler Inhale 2 puffs into the lungs every 6 hours as needed for shortness of breath / dyspnea or wheezing 1 Inhaler 0     aspirin 81 MG tablet Take 1 tablet (81 mg) by mouth daily 30 tablet      atorvastatin (LIPITOR) 20 MG tablet Take 1 tablet (20 mg) by mouth daily 90 tablet 3     blood glucose (NO BRAND SPECIFIED) lancets standard Use to test blood sugar 6 times daily or as directed. 200 each 6     blood glucose monitoring (NO BRAND SPECIFIED) meter device kit Use to test blood sugar 2 times daily or as directed. 1 kit 0     blood glucose monitoring (ONETOUCH VERIO IQ) test strip Use to test blood sugars 10 times daily or as directed. 300 strip 6     Continuous Blood Gluc  (FREESTYLE CARI READER) MIKAELA 1 each daily 1 Device 1     continuous blood glucose monitoring (FREESTYLE CARI) sensor For use with Freestyle Cari Flash  for continuous monitioring of blood glucose levels. Replace sensor every 10 days. 3 each 11     continuous blood glucose monitoring (FREESTYLE CARI) sensor For use with Freestyle Cari Flash  . Replace sensor every 10 days. 3 each 11     insulin aspart (NOVOLOG FLEXPEN) 100 UNIT/ML injection 1 unit per 10 grams of carbohydrates + correction with meals  TID  approx  25 units daily Max 15 mL 11     insulin glargine (LANTUS SOLOSTAR) 100 UNIT/ML pen Inject 23 units subcutaneous at bedtime. 15 mL 11     insulin pen needle (B-D U/F) 31G X 5 MM Use 5  time(s) per day. 150 each 11     insulin pen needle 31G X 6 MM Use up to 5 daily or as directed. 150 each 11      "insulin pen needle 32G X 4 MM Use 6 pen needles daily or as directed. 200 each 11     Multiple Vitamins-Minerals (MULTIVITAMIN ADULT PO)        glucagon (GLUCAGON EMERGENCY) 1 MG kit Inject 1 mg into the muscle once for 1 dose 1 mg 1     [DISCONTINUED] insulin glargine (LANTUS SOLOSTAR) 100 UNIT/ML injection Inject 25 Units Subcutaneous At Bedtime Go up by 2 units weekly until fasting blood sugars are . (Patient taking differently: Inject 24 Units Subcutaneous At Bedtime Go up by 2 units weekly until fasting blood sugars are .) 15 mL 11       Family History  family history includes Alcohol/Drug in his father; Allergies in his sister; Depression in his father and mother; Diabetes in his paternal uncle and paternal uncle; HEART DISEASE in his father; Hypertension in his father. There is no history of Cancer.    Social History   reports that he has quit smoking. He has never used smokeless tobacco. He reports that he does not drink alcohol or use illicit drugs.     Past Medical History  Past Medical History:   Diagnosis Date     Bronchitis, chronic, simple (H)      Obesity        Past Surgical History:   Procedure Laterality Date     C ORAL SURGERY PROCEDURE  1994    [place] Teeth Extraction       Physical Exam  /77 (BP Location: Right arm, Patient Position: Sitting, Cuff Size: Adult Regular)  Pulse 80  Ht 1.778 m (5' 10\")  Wt 92.6 kg (204 lb 1.6 oz)  BMI 29.29 kg/m2  Body mass index is 29.29 kg/(m^2).    GENERAL : In no apparent distress    RESULTS  Creatinine   Date Value Ref Range Status   04/24/2018 0.64 (L) 0.66 - 1.25 mg/dL Final     GFR Estimate   Date Value Ref Range Status   04/24/2018 >90 >60 mL/min/1.7m2 Final     Comment:     Non  GFR Calc     Hemoglobin A1C   Date Value Ref Range Status   05/14/2018 8.1 (H) 0 - 5.6 % Final     Comment:     Normal <5.7% Prediabetes 5.7-6.4%  Diabetes 6.5% or higher - adopted from ADA   consensus guidelines.       Potassium   Date Value " Ref Range Status   04/24/2018 4.4 3.4 - 5.3 mmol/L Final     ALT   Date Value Ref Range Status   04/24/2018 37 0 - 70 U/L Final     AST   Date Value Ref Range Status   04/24/2018 26 0 - 45 U/L Final     TSH   Date Value Ref Range Status   03/05/2018 1.19 0.40 - 4.00 mU/L Final       Cholesterol   Date Value Ref Range Status   03/05/2018 202 (H) <200 mg/dL Final     Comment:     Desirable:       <200 mg/dl   08/16/2016 89 <200 mg/dL Final     HDL Cholesterol   Date Value Ref Range Status   03/05/2018 25 (L) >39 mg/dL Final   08/16/2016 24 (L) >39 mg/dL Final     LDL Cholesterol Calculated   Date Value Ref Range Status   03/05/2018 108 (H) <100 mg/dL Final     Comment:     Above desirable:  100-129 mg/dl  Borderline High:  130-159 mg/dL  High:             160-189 mg/dL  Very high:       >189 mg/dl     08/16/2016 52 <100 mg/dL Final     Comment:     Desirable:       <100 mg/dl     Triglycerides   Date Value Ref Range Status   03/05/2018 345 (H) <150 mg/dL Final     Comment:     Borderline high:  150-199 mg/dl  High:             200-499 mg/dl  Very high:       >499 mg/dl  Fasting specimen     08/16/2016 63 <150 mg/dL Final     Comment:     Fasting specimen     Cholesterol/HDL Ratio   Date Value Ref Range Status   09/25/2014 4.5 0.0 - 5.0 Final   02/23/2009 6.8 (H) 0.0 - 5.0 Final     A1C   6.4 % today.  A1C   15  % in 3/2018.    ASSESSMENT/PLAN:    1. TYPE 1 DIABETES MELLITUS: Pt dx having type 1 diabetes mellitus in March 2018.  I had him reduce his Lantus 23 units subcutaneous at hs. No change in Novolog I/C ratio.  Pt will see Oph in March of next year.  Will recheck urine microalbuminuria and fasting lipid panel.  His feet are ok.  Pt will recheck his BP at home or work.  He is taking 81 mg ASA daily.  Reminded him to get the flu vaccine this season.    2.  CELIAC: Stable on gluten free diet.    3.  Return to Endocrine Clinic to see me in 3 months and Dr. Olguin in 6 months.    Again, thank you for allowing me to  participate in the care of your patient.      Sincerely,    Btey Dacosta PA-C

## 2018-10-05 DIAGNOSIS — E10.9 TYPE 1 DIABETES MELLITUS WITHOUT COMPLICATION (H): ICD-10-CM

## 2018-10-05 LAB
ALT SERPL W P-5'-P-CCNC: 41 U/L (ref 0–70)
AST SERPL W P-5'-P-CCNC: 20 U/L (ref 0–45)
CHOLEST SERPL-MCNC: 124 MG/DL
CREAT SERPL-MCNC: 0.56 MG/DL (ref 0.66–1.25)
CREAT UR-MCNC: 210 MG/DL
GFR SERPL CREATININE-BSD FRML MDRD: >90 ML/MIN/1.7M2
HDLC SERPL-MCNC: 39 MG/DL
LDLC SERPL CALC-MCNC: 73 MG/DL
MICROALBUMIN UR-MCNC: 16 MG/L
MICROALBUMIN/CREAT UR: 7.76 MG/G CR (ref 0–17)
NONHDLC SERPL-MCNC: 85 MG/DL
POTASSIUM SERPL-SCNC: 4.1 MMOL/L (ref 3.4–5.3)
TRIGL SERPL-MCNC: 59 MG/DL
TSH SERPL DL<=0.005 MIU/L-ACNC: 0.48 MU/L (ref 0.4–4)

## 2018-10-05 PROCEDURE — 36415 COLL VENOUS BLD VENIPUNCTURE: CPT | Performed by: PHYSICIAN ASSISTANT

## 2018-10-05 PROCEDURE — 84460 ALANINE AMINO (ALT) (SGPT): CPT | Performed by: PHYSICIAN ASSISTANT

## 2018-10-05 PROCEDURE — 82043 UR ALBUMIN QUANTITATIVE: CPT | Performed by: PHYSICIAN ASSISTANT

## 2018-10-05 PROCEDURE — 82565 ASSAY OF CREATININE: CPT | Performed by: PHYSICIAN ASSISTANT

## 2018-10-05 PROCEDURE — 84450 TRANSFERASE (AST) (SGOT): CPT | Performed by: PHYSICIAN ASSISTANT

## 2018-10-05 PROCEDURE — 84443 ASSAY THYROID STIM HORMONE: CPT | Performed by: PHYSICIAN ASSISTANT

## 2018-10-05 PROCEDURE — 84132 ASSAY OF SERUM POTASSIUM: CPT | Performed by: PHYSICIAN ASSISTANT

## 2018-10-05 PROCEDURE — 80061 LIPID PANEL: CPT | Performed by: PHYSICIAN ASSISTANT

## 2018-11-25 ENCOUNTER — OFFICE VISIT (OUTPATIENT)
Dept: URGENT CARE | Facility: URGENT CARE | Age: 43
End: 2018-11-25
Payer: COMMERCIAL

## 2018-11-25 VITALS
RESPIRATION RATE: 14 BRPM | SYSTOLIC BLOOD PRESSURE: 122 MMHG | WEIGHT: 202 LBS | DIASTOLIC BLOOD PRESSURE: 80 MMHG | TEMPERATURE: 98.4 F | OXYGEN SATURATION: 97 % | BODY MASS INDEX: 28.92 KG/M2 | HEART RATE: 62 BPM | HEIGHT: 70 IN

## 2018-11-25 DIAGNOSIS — A08.4 VIRAL GASTROENTERITIS: Primary | ICD-10-CM

## 2018-11-25 DIAGNOSIS — E10.9 TYPE 1 DIABETES MELLITUS WITHOUT COMPLICATION (H): ICD-10-CM

## 2018-11-25 PROCEDURE — 99214 OFFICE O/P EST MOD 30 MIN: CPT | Performed by: FAMILY MEDICINE

## 2018-11-25 NOTE — PATIENT INSTRUCTIONS
If post meal blood sugar 30 minutes after carb correction is (Can do after breakfast, lunch, dinner):   160-210 give 1 extra unit aspart  211-260 give 2 extra units aspart  261-300 give 3 extra units aspart      If sugars go above 350 call your doctor's office      If your fasting morning sugars haven't returned to normal by Weds call your Doctor's office    --    If you have ongoing loose stools take loperamide/imodium 15 minutes before each meal (use up to 4 times a day no more than 4 hours within each dose)

## 2018-11-25 NOTE — MR AVS SNAPSHOT
After Visit Summary   11/25/2018    Jake Brothers    MRN: [id number]           Patient Information     Date Of Birth          1975        Visit Information        Provider Department      11/25/2018 10:10 AM Lucas Ignacio MD [facility]        Today's Diagnoses     Viral gastroenteritis    -  1    Type 1 diabetes mellitus without complication (H)          Care Instructions    If post meal blood sugar 30 minutes after carb correction is (Can do after breakfast, lunch, dinner):   160-210 give 1 extra unit aspart  211-260 give 2 extra units aspart  261-300 give 3 extra units aspart      If sugars go above 350 call your doctor's office      If your fasting morning sugars haven't returned to normal by Weds call your Doctor's office    --    If you have ongoing loose stools take loperamide/imodium 15 minutes before each meal (use up to 4 times a day no more than 4 hours within each dose)           Follow-ups after your visit        Your next 10 appointments already scheduled     Jan 03, 2019 11:30 AM CST   (Arrive by 11:15 AM)   RETURN DIABETES with Bety Dacosta PA-C   [facility] Endocrinology ([facility])    [address] [place] [id number] [phone]              Who to contact     If you have questions or need follow up information about today's clinic visit or your schedule please contact [facility] URGENT CARE directly at [phone].  Normal or non-critical lab and imaging results will be communicated to you by MyChart, letter or phone within 4 business days after the clinic has received the results. If you do not hear from us within 7 days, please contact the clinic through MyChart or phone. If you have a critical or abnormal lab result, we will notify you by phone as soon as possible.  Submit refill requests through [employer] or call your pharmacy and they will forward the refill request to  "us. Please allow 3 business days for your refill to be completed.          Additional Information About Your Visit        MyChart Information     [employer] gives you secure access to your electronic health record. If you see a primary care provider, you can also send messages to your care team and make appointments. If you have questions, please call your primary care clinic.  If you do not have a primary care provider, please call [phone] and they will assist you.        Care EveryWhere ID     This is your Care EveryWhere ID. This could be used by other organizations to access your [facility] medical records  [id number]        Your Vitals Were     Pulse Temperature Respirations Height Pulse Oximetry BMI (Body Mass Index)    62 98.4  F (36.9  C) (Oral) 14 5' 10\" (1.778 m) 97% 28.98 kg/m2       Blood Pressure from Last 3 Encounters:   11/25/18 122/80   09/27/18 139/77   05/14/18 120/78    Weight from Last 3 Encounters:   11/25/18 202 lb (91.6 kg)   09/27/18 204 lb 1.6 oz (92.6 kg)   05/14/18 194 lb 8 oz (88.2 kg)              Today, you had the following     No orders found for display       Primary Care Provider Office Phone # Fax #    Caden Ilya Mitchell [phone] [address] [place] [id number]        Equal Access to Services     MARZENA BROCK : Hadii anjelica Qureshi, waaxda luqadaha, qaybta kaalmavlad samuel, christen rodriguez. So [facility] [phone].    ATENCIÓN: Si habla español, tiene a bender disposición servicios gratuitos de asistencia lingüística. Sangita al [phone].    We comply with applicable federal civil rights laws and [place] laws. We do not discriminate on the basis of race, color, national origin, age, disability, sex, sexual orientation, or gender identity.            Thank you!     Thank you for choosing [facility] URGENT CARE  for your care. Our goal is always to provide you with excellent care. Hearing back from " our patients is one way we can continue to improve our services. Please take a few minutes to complete the written survey that you may receive in the mail after your visit with us. Thank you!             Your Updated Medication List - Protect others around you: Learn how to safely use, store and throw away your medicines at www.disposemymeds.org.          This list is accurate as of 11/25/18 11:04 AM.  Always use your most recent med list.                   Brand Name Dispense Instructions for use Diagnosis    albuterol 108 (90 Base) MCG/ACT inhaler    PROAIR HFA/PROVENTIL HFA/VENTOLIN HFA    1 Inhaler    Inhale 2 puffs into the lungs every 6 hours as needed for shortness of breath / dyspnea or wheezing    Cough       aspirin 81 MG tablet    ASA    30 tablet    Take 1 tablet (81 mg) by mouth daily        atorvastatin 20 MG tablet    LIPITOR    90 tablet    Take 1 tablet (20 mg) by mouth daily    Uncontrolled type 1 diabetes mellitus without complication (H)       blood glucose lancets standard    no brand specified    200 each    Use to test blood sugar 6 times daily or as directed.    Uncontrolled type 2 diabetes mellitus with insulin therapy (H), Uncontrolled type 1 diabetes mellitus without complication (H)       blood glucose monitoring meter device kit    no brand specified    1 kit    Use to test blood sugar 2 times daily or as directed.    Uncontrolled type 2 diabetes mellitus with insulin therapy (H)       blood glucose monitoring test strip    ONETOUCH VERIO IQ    300 strip    Use to test blood sugars 10 times daily or as directed.    Uncontrolled type 1 diabetes mellitus without complication (H), Uncontrolled type 2 diabetes mellitus with insulin therapy (H)       * continuous blood glucose monitoring sensor     3 each    For use with Freestyle Sukhi Flash  . Replace sensor every 10 days.    Type 1 diabetes mellitus with hyperglycemia (H)       * continuous blood glucose monitoring sensor     3  each    For use with Freestyle Sukhi Flash  for continuous monitioring of blood glucose levels. Replace sensor every 10 days.    Type 1 diabetes mellitus without complication (H)       FREESTYLE SUKHI READER [place]     1 Device    1 each daily    Type 1 diabetes mellitus with hyperglycemia (H)       glucagon 1 MG kit    GLUCAGON EMERGENCY    1 mg    Inject 1 mg into the muscle once for 1 dose    Uncontrolled type 1 diabetes mellitus without complication (H)       * insulin pen needle 31G X 6 MM miscellaneous    31G X 6 MM    150 each    Use up to 5 daily or as directed.    Uncontrolled type 2 diabetes mellitus with insulin therapy (H)       * insulin pen needle 31G X 5 MM miscellaneous    B-D U/F    150 each    Use 5  time(s) per day.    Uncontrolled type 1 diabetes mellitus without complication (H)       * insulin pen needle 32G X 4 MM miscellaneous    32G X 4 MM    200 each    Use 6 pen needles daily or as directed.    Uncontrolled type 2 diabetes mellitus with insulin therapy (H), Uncontrolled type 1 diabetes mellitus without complication (H)       LANTUS SOLOSTAR 100 UNIT/ML pen   Generic drug:  insulin glargine     15 mL    Inject 23 units subcutaneous at bedtime.    Uncontrolled type 2 diabetes mellitus with insulin therapy (H)       MULTIVITAMIN ADULT PO           NovoLOG FLEXPEN 100 UNIT/ML pen   Generic drug:  insulin aspart     15 mL    1 unit per 10 grams of carbohydrates + correction with meals  TID  approx  25 units daily Max    Uncontrolled type 1 diabetes mellitus without complication (H)       * Notice:  This list has 5 medication(s) that are the same as other medications prescribed for you. Read the directions carefully, and ask your doctor or other care provider to review them with you.

## 2018-11-25 NOTE — PROGRESS NOTES
Subjective:   Jake Brothers is a 43 year old male who presents for   Chief Complaint   Patient presents with     Urgent Care     Abdominal Pain     blood sugars have been off for about 5 days. sick since Thursday, right sided abdominal pain, radiates to the front and up.      Patient comes in for feeling unwell for approximately 3 days now.  He reports no fevers, difficulty breathing, cough.  He has had no episodes of vomiting but does experience some nausea.  He can hold down food and liquids without any difficulty.  He is not currently using any alcohol.  No sick contacts around him.  Slight abdominal discomfort in the right lower quadrant.  Reports no previous history of surgeries.  Pain is not exclusive to occurring after meals.  .     T1DM:   Patient typically has morning fasting sugars in the 110s-120s.  He takes 24 units of long-acting each evening in addition does a 1-10 carb ratio of aspart with a total of about 9 units in total for the day.  Since he has been ill he reports his morning sugars have been in the 200s this morning it was 192.  During the daytime he has noticed his postmeal sugars despite carb correction still being in the 250-350 range.  He has not been in diabetic ketoacidosis before.  He denies any increased work of breathing.  No confusion.  Patient Active Problem List    Diagnosis Date Noted     Type 1 diabetes mellitus without complication (H) 03/09/2018     Priority: Medium     Celiac disease 10/02/2017     Priority: Medium     Vitiligo 10/02/2017     Priority: Medium     Iron deficiency 10/02/2017     Priority: Medium     Hepatic steatosis 08/18/2016     Priority: Medium     Cervicalgia 07/19/2016     Priority: Medium     Elevated LFTs 12/24/2014     Priority: Medium     Eczema 12/02/2013     Priority: Medium     CARDIOVASCULAR SCREENING; LDL GOAL LESS THAN 160 05/09/2010     Priority: Medium     Obesity      Priority: Medium       Current Outpatient Prescriptions   Medication      "insulin aspart (NOVOLOG FLEXPEN) 100 UNIT/ML injection     insulin glargine (LANTUS SOLOSTAR) 100 UNIT/ML pen     albuterol (PROAIR HFA, PROVENTIL HFA, VENTOLIN HFA) 108 (90 BASE) MCG/ACT inhaler     aspirin 81 MG tablet     atorvastatin (LIPITOR) 20 MG tablet     blood glucose (NO BRAND SPECIFIED) lancets standard     blood glucose monitoring (NO BRAND SPECIFIED) meter device kit     blood glucose monitoring (ONETOUCH VERIO IQ) test strip     Continuous Blood Gluc  (FREESTYLE CARI READER) MIKAELA     continuous blood glucose monitoring (FREESTYLE CARI) sensor     continuous blood glucose monitoring (FREESTYLE CARI) sensor     glucagon (GLUCAGON EMERGENCY) 1 MG kit     insulin pen needle (B-D U/F) 31G X 5 MM     insulin pen needle 31G X 6 MM     insulin pen needle 32G X 4 MM     Multiple Vitamins-Minerals (MULTIVITAMIN ADULT PO)     No current facility-administered medications for this visit.        ROS:  As above per HPI    Objective:   /80  Pulse 62  Temp 98.4  F (36.9  C) (Oral)  Resp 14  Ht 5' 10\" (1.778 m)  Wt 202 lb (91.6 kg)  SpO2 97%  BMI 28.98 kg/m2, Body mass index is 28.98 kg/(m^2).  Gen:  NAD, well-nourished, sitting in chair comfortably  HEENT: EOMI, sclera anicteric, Head normocephalic, ; nares patent; moist mucous membranes  Neck: trachea midline, no thyromegaly  CV:  Hemodynamically stable, RRR  Pulm:  no increased work of breathing , CTAB, no wheezes/rales/rhonchi   ABD: soft, non-distended, normoactive bowel sounds, minimal RLQ pain, jackson's sign negative, no guarding,   Extrem: no cyanosis, edema or clubbing  Skin: no obvious rashes or abnormalities  Psych: Euthymic, linear thoughts, normal rate of speech    Assessment & Plan:   Jake Brothers, 43 year old male who presents with:    Viral gastroenteritis  Symptoms have been improving reassuringly has had no blood in his stools.  Afebrile here today.  Frequency of loose stools significantly decreased from the previous 2 days.  " Recommended Imodium as needed 15 minutes before each meal not to be used more than every 4-6 hours.  Follow-up as necessary.    Type 1 diabetes mellitus without complication (H)  Using low at 18 hundredths an approximate 30 units of insulin daily will recommend for his elevated postprandial sugars recorded 30 minutes after administration of carb correction aspart as below:   160-210 give 1 extra unit aspart  211-260 give 2 extra units aspart  261-300 give 3 extra units aspart    As this is outpatient I hesitate to increase his Lantus as this could bottom him out and he has been well-controlled prior to this GI illness.  If his blood sugars do not return to normal the next few days as the illness resolves would recommend that he see his provider.  Patient comfortable with the plan for modified correction regimen.     Lucas Ignacio MD   [facility]     Options for treatment and/or follow-up care were reviewed with the patient. Jake Brothers and/or legal guardian was engaged and actively involved in the decision making process. Patient/guardian verbalized understanding of the options discussed and was satisfied with the final plan.

## 2019-01-08 ENCOUNTER — OFFICE VISIT (OUTPATIENT)
Dept: ENDOCRINOLOGY | Facility: CLINIC | Age: 44
End: 2019-01-08
Payer: COMMERCIAL

## 2019-01-08 VITALS
HEART RATE: 80 BPM | SYSTOLIC BLOOD PRESSURE: 142 MMHG | WEIGHT: 209 LBS | HEIGHT: 70 IN | BODY MASS INDEX: 29.92 KG/M2 | DIASTOLIC BLOOD PRESSURE: 89 MMHG

## 2019-01-08 DIAGNOSIS — E10.9 TYPE 1 DIABETES MELLITUS WITHOUT COMPLICATION (H): Primary | ICD-10-CM

## 2019-01-08 LAB — HBA1C MFR BLD: 7.9 % (ref 4.3–6)

## 2019-01-08 RX ORDER — FLASH GLUCOSE SENSOR
1 KIT MISCELLANEOUS
Qty: 7 EACH | Refills: 3 | Status: SHIPPED | OUTPATIENT
Start: 2019-01-08 | End: 2020-01-02

## 2019-01-08 RX ORDER — FLASH GLUCOSE SCANNING READER
1 EACH MISCELLANEOUS
Qty: 1 DEVICE | Refills: 0 | Status: SHIPPED | OUTPATIENT
Start: 2019-01-08 | End: 2020-01-02

## 2019-01-08 ASSESSMENT — PAIN SCALES - GENERAL: PAINLEVEL: NO PAIN (0)

## 2019-01-08 ASSESSMENT — MIFFLIN-ST. JEOR: SCORE: 1854.27

## 2019-01-08 NOTE — PROGRESS NOTES
HPI  Jake Brothers is a 43 year old male with newly dx ( March 2018 ) type 1 diabetes mellitus here today for a follow-up visit.  Jake was diagnosed with type 1 diabetes mellitus in March 2018.  Pt's paternal uncle has hx of type 1 diabetes.  Jake's history is significant for celiac, vitiligo, obesity and hx of hepatic steatosis.  Pt has no known history of retinopathy, nephropathy or neuropathy.  For his diabetes, he is currently taking Lantus 25 units SQ at bedtime and NovoLog 1 unit / 10 gms CHO with meals and snacks with correction insulin ( 1 unit/50 for BG > 150 ).  Patient's A1C is 7.9 % today.  His previous A1C was 6.4 %.  His A1C at time of dx was > 15 % in March 2018.  We were unable to download pt's Freestyle Sukhi device today.  His 7 day average blood sugar was 223, 14 day average bs was 214 and average 30 day bs was 212.  No hypoglycemia per patient.  On ROS today, Jake has gained weight and less active over the winter months.  He denies frequent headaches, blurred vision, nausea, vomiting, shortness of breath at rest or chronic cough.  He denies chest pain, abdominal pain, diarrhea, dysuria, hematuria or foot ulcers.  Patient has no numbness tingling or pain in his toes or fingers.  He continues to follow a gluten-free diet without diarrhea.      Diabetes Care  Retinopathy:none; pt seen by Oph in 3/2018.  Nephropathy:none; urine microalbuminuria was + at time when his A1C was > 15 %. Recheck urine microalbuminuria was negative in Oct 2018.  Neuropathy:none.  Foot Exam: no ulcers.  Taking aspirin: no.  Lipids: LDL 73 in Oct 2018. Pt is taking Lipitor daily.    ROS  Please see under history of present illness.    Allergies  No Known Allergies    Medications  Current Outpatient Medications   Medication Sig Dispense Refill     albuterol (PROAIR HFA, PROVENTIL HFA, VENTOLIN HFA) 108 (90 BASE) MCG/ACT inhaler Inhale 2 puffs into the lungs every 6 hours as needed for shortness of breath / dyspnea or wheezing  "1 Inhaler 0     atorvastatin (LIPITOR) 20 MG tablet Take 1 tablet (20 mg) by mouth daily 90 tablet 3     Continuous Blood Gluc  (FREESTYLE CARI 14 DAY READER) MIKAELA 1 Device 5 times daily 1 Device 0     Continuous Blood Gluc Sensor (FREESTYLE CARI 14 DAY SENSOR) MISC 1 Device every 14 days 7 each 3     continuous blood glucose monitoring (FREESTYLE CARI) sensor For use with Freestyle Cari Flash  . Replace sensor every 10 days. 3 each 11     insulin aspart (NOVOLOG FLEXPEN) 100 UNIT/ML pen 1 unit per 8  grams of carbohydrates + correction with meals  TID  approx  30 units daily. 15 mL 3     insulin glargine (LANTUS SOLOSTAR PEN) 100 UNIT/ML pen Inject 26 units subcutaneous at bedtime. 15 mL 11     insulin pen needle 32G X 4 MM Use 6 pen needles daily or as directed. 200 each 11     Multiple Vitamins-Minerals (MULTIVITAMIN ADULT PO)        glucagon (GLUCAGON EMERGENCY) 1 MG kit Inject 1 mg into the muscle once for 1 dose 1 mg 1       Family History  family history includes Alcohol/Drug in his father; Allergies in his sister; Depression in his father and mother; Diabetes in his paternal uncle and paternal uncle; Heart Disease in his father; Hypertension in his father.    Social History   reports that he has quit smoking. he has never used smokeless tobacco. He reports that he does not drink alcohol or use drugs.     Past Medical History  Past Medical History:   Diagnosis Date     Bronchitis, chronic, simple (H)      Obesity        Past Surgical History:   Procedure Laterality Date     C ORAL SURGERY PROCEDURE  1994    [place] Teeth Extraction       Physical Exam  /89   Pulse 80   Ht 1.778 m (5' 10\")   Wt 94.8 kg (209 lb)   BMI 29.99 kg/m    Body mass index is 29.99 kg/m .    GENERAL : In no apparent distress    RESULTS  Creatinine   Date Value Ref Range Status   10/05/2018 0.56 (L) 0.66 - 1.25 mg/dL Final     GFR Estimate   Date Value Ref Range Status   10/05/2018 >90 >60 mL/min/1.7m2 Final "     Comment:     Non  GFR Calc     Hemoglobin A1C   Date Value Ref Range Status   05/14/2018 8.1 (H) 0 - 5.6 % Final     Comment:     Normal <5.7% Prediabetes 5.7-6.4%  Diabetes 6.5% or higher - adopted from ADA   consensus guidelines.       Potassium   Date Value Ref Range Status   10/05/2018 4.1 3.4 - 5.3 mmol/L Final     ALT   Date Value Ref Range Status   10/05/2018 41 0 - 70 U/L Final     AST   Date Value Ref Range Status   10/05/2018 20 0 - 45 U/L Final     TSH   Date Value Ref Range Status   10/05/2018 0.48 0.40 - 4.00 mU/L Final       Cholesterol   Date Value Ref Range Status   10/05/2018 124 <200 mg/dL Final   03/05/2018 202 (H) <200 mg/dL Final     Comment:     Desirable:       <200 mg/dl     HDL Cholesterol   Date Value Ref Range Status   10/05/2018 39 (L) >39 mg/dL Final   03/05/2018 25 (L) >39 mg/dL Final     LDL Cholesterol Calculated   Date Value Ref Range Status   10/05/2018 73 <100 mg/dL Final     Comment:     Desirable:       <100 mg/dl   03/05/2018 108 (H) <100 mg/dL Final     Comment:     Above desirable:  100-129 mg/dl  Borderline High:  130-159 mg/dL  High:             160-189 mg/dL  Very high:       >189 mg/dl       Triglycerides   Date Value Ref Range Status   10/05/2018 59 <150 mg/dL Final     Comment:     Fasting specimen   03/05/2018 345 (H) <150 mg/dL Final     Comment:     Borderline high:  150-199 mg/dl  High:             200-499 mg/dl  Very high:       >499 mg/dl  Fasting specimen       Cholesterol/HDL Ratio   Date Value Ref Range Status   09/25/2014 4.5 0.0 - 5.0 Final   02/23/2009 6.8 (H) 0.0 - 5.0 Final     A1C   7.9   1/8/2019  A1C   6.4 % 9/2018  A1C   15  % in 3/2018.    ASSESSMENT/PLAN:    1. TYPE 1 DIABETES MELLITUS: Pt dx having type 1 diabetes mellitus in March 2018.  He has gained weight and less active over the winter months.  I had him increase Lantus 26 units subcutaneous at hs and also change in Novolog I/C ratio 1:8 ( was 1:10 ) with correction  insulin.  Pt was seen by Oph in March 2018.  His urine microalbuminuria negative in Oct 2018 with normal creat/GFR.  His feet are ok.  Pt states he checks his BP at home which is in the 120/80 range.  Jake had the flu vaccine this season.    2.  CELIAC: Stable on gluten free diet.    3.  Return to Endocrine Clinic to see me in 3 months.

## 2019-01-08 NOTE — LETTER
1/8/2019       RE: Jake Brothers  [address] [place] [id number]     Dear Colleague,    Thank you for referring your patient, Jake Brothers, to the [facility] ENDOCRINOLOGY at [facility]. Please see a copy of my visit note below.    HPI  Jake Brothers is a 43 year old male with newly dx ( March 2018 ) type 1 diabetes mellitus here today for a follow-up visit.  Jake was diagnosed with type 1 diabetes mellitus in March 2018.  Pt's paternal uncle has hx of type 1 diabetes.  Jake's history is significant for celiac, vitiligo, obesity and hx of hepatic steatosis.  Pt has no known history of retinopathy, nephropathy or neuropathy.  For his diabetes, he is currently taking Lantus 25 units SQ at bedtime and NovoLog 1 unit / 10 gms CHO with meals and snacks with correction insulin ( 1 unit/50 for BG > 150 ).  Patient's A1C is 7.9 % today.  His previous A1C was 6.4 %.  His A1C at time of dx was > 15 % in March 2018.  We were unable to download pt's Freestyle Sukhi device today.  His 7 day average blood sugar was 223, 14 day average bs was 214 and average 30 day bs was 212.  No hypoglycemia per patient.  On ROS today, Jake has gained weight and less active over the winter months.  He denies frequent headaches, blurred vision, nausea, vomiting, shortness of breath at rest or chronic cough.  He denies chest pain, abdominal pain, diarrhea, dysuria, hematuria or foot ulcers.  Patient has no numbness tingling or pain in his toes or fingers.  He continues to follow a gluten-free diet without diarrhea.      Diabetes Care  Retinopathy:none; pt seen by Oph in 3/2018.  Nephropathy:none; urine microalbuminuria was + at time when his A1C was > 15 %. Recheck urine microalbuminuria was negative in Oct 2018.  Neuropathy:none.  Foot Exam: no ulcers.  Taking aspirin: no.  Lipids: LDL 73 in Oct 2018. Pt is taking Lipitor daily.    Allergies  No Known Allergies    Medications  Current  "Outpatient Medications   Medication Sig Dispense Refill     albuterol (PROAIR HFA, PROVENTIL HFA, VENTOLIN HFA) 108 (90 BASE) MCG/ACT inhaler Inhale 2 puffs into the lungs every 6 hours as needed for shortness of breath / dyspnea or wheezing 1 Inhaler 0     atorvastatin (LIPITOR) 20 MG tablet Take 1 tablet (20 mg) by mouth daily 90 tablet 3     Continuous Blood Gluc  (FREESTYLE CARI 14 DAY READER) MIKAELA 1 Device 5 times daily 1 Device 0     Continuous Blood Gluc Sensor (FREESTYLE CARI 14 DAY SENSOR) MISC 1 Device every 14 days 7 each 3     continuous blood glucose monitoring (FREESTYLE CARI) sensor For use with Freestyle Cari Flash  . Replace sensor every 10 days. 3 each 11     insulin aspart (NOVOLOG FLEXPEN) 100 UNIT/ML pen 1 unit per 8  grams of carbohydrates + correction with meals  TID  approx  30 units daily. 15 mL 3     insulin glargine (LANTUS SOLOSTAR PEN) 100 UNIT/ML pen Inject 26 units subcutaneous at bedtime. 15 mL 11     insulin pen needle 32G X 4 MM Use 6 pen needles daily or as directed. 200 each 11     Multiple Vitamins-Minerals (MULTIVITAMIN ADULT PO)        glucagon (GLUCAGON EMERGENCY) 1 MG kit Inject 1 mg into the muscle once for 1 dose 1 mg 1     Family History  family history includes Alcohol/Drug in his father; Allergies in his sister; Depression in his father and mother; Diabetes in his paternal uncle and paternal uncle; Heart Disease in his father; Hypertension in his father.    Social History   reports that he has quit smoking. he has never used smokeless tobacco. He reports that he does not drink alcohol or use drugs.     Past Medical History  Past Medical History:   Diagnosis Date     Bronchitis, chronic, simple (H)      Obesity        Past Surgical History:   Procedure Laterality Date     C ORAL SURGERY PROCEDURE  1994    [place] Teeth Extraction     Physical Exam  /89   Pulse 80   Ht 1.778 m (5' 10\")   Wt 94.8 kg (209 lb)   BMI 29.99 kg/m     Body mass index " is 29.99 kg/m .    GENERAL : In no apparent distress    RESULTS  Creatinine   Date Value Ref Range Status   10/05/2018 0.56 (L) 0.66 - 1.25 mg/dL Final     GFR Estimate   Date Value Ref Range Status   10/05/2018 >90 >60 mL/min/1.7m2 Final     Comment:     Non  GFR Calc     Hemoglobin A1C   Date Value Ref Range Status   05/14/2018 8.1 (H) 0 - 5.6 % Final     Comment:     Normal <5.7% Prediabetes 5.7-6.4%  Diabetes 6.5% or higher - adopted from ADA   consensus guidelines.       Potassium   Date Value Ref Range Status   10/05/2018 4.1 3.4 - 5.3 mmol/L Final     ALT   Date Value Ref Range Status   10/05/2018 41 0 - 70 U/L Final     AST   Date Value Ref Range Status   10/05/2018 20 0 - 45 U/L Final     TSH   Date Value Ref Range Status   10/05/2018 0.48 0.40 - 4.00 mU/L Final       Cholesterol   Date Value Ref Range Status   10/05/2018 124 <200 mg/dL Final   03/05/2018 202 (H) <200 mg/dL Final     Comment:     Desirable:       <200 mg/dl     HDL Cholesterol   Date Value Ref Range Status   10/05/2018 39 (L) >39 mg/dL Final   03/05/2018 25 (L) >39 mg/dL Final     LDL Cholesterol Calculated   Date Value Ref Range Status   10/05/2018 73 <100 mg/dL Final     Comment:     Desirable:       <100 mg/dl   03/05/2018 108 (H) <100 mg/dL Final     Comment:     Above desirable:  100-129 mg/dl  Borderline High:  130-159 mg/dL  High:             160-189 mg/dL  Very high:       >189 mg/dl       Triglycerides   Date Value Ref Range Status   10/05/2018 59 <150 mg/dL Final     Comment:     Fasting specimen   03/05/2018 345 (H) <150 mg/dL Final     Comment:     Borderline high:  150-199 mg/dl  High:             200-499 mg/dl  Very high:       >499 mg/dl  Fasting specimen       Cholesterol/HDL Ratio   Date Value Ref Range Status   09/25/2014 4.5 0.0 - 5.0 Final   02/23/2009 6.8 (H) 0.0 - 5.0 Final     A1C   7.9   1/8/2019  A1C   6.4 % 9/2018  A1C   15  % in 3/2018.    ASSESSMENT/PLAN:    1. TYPE 1 DIABETES MELLITUS: Pt dx  having type 1 diabetes mellitus in March 2018.  He has gained weight and less active over the winter months.  I had him increase Lantus 26 units subcutaneous at hs and also change in Novolog I/C ratio 1:8 ( was 1:10 ) with correction insulin.  Pt was seen by Oph in March 2018.  His urine microalbuminuria negative in Oct 2018 with normal creat/GFR.  His feet are ok.  Pt states he checks his BP at home which is in the 120/80 range.  Jake had the flu vaccine this season.    2.  CELIAC: Stable on gluten free diet.    3.  Return to Endocrine Clinic to see me in 3 months.     Again, thank you for allowing me to participate in the care of your patient.      Sincerely,    Bety Dacosta PA-C

## 2019-03-11 ENCOUNTER — DOCUMENTATION ONLY (OUTPATIENT)
Dept: CARE COORDINATION | Facility: CLINIC | Age: 44
End: 2019-03-11

## 2019-04-03 ENCOUNTER — HOSPITAL ENCOUNTER (EMERGENCY)
Facility: CLINIC | Age: 44
Discharge: HOME | End: 2019-04-03
Attending: EMERGENCY MEDICINE | Admitting: EMERGENCY MEDICINE
Payer: OTHER MISCELLANEOUS

## 2019-04-03 VITALS
OXYGEN SATURATION: 96 % | HEART RATE: 100 BPM | TEMPERATURE: 98.2 F | DIASTOLIC BLOOD PRESSURE: 96 MMHG | SYSTOLIC BLOOD PRESSURE: 123 MMHG | RESPIRATION RATE: 16 BRPM

## 2019-04-03 DIAGNOSIS — T14.8XXA MUSCLE STRAIN: ICD-10-CM

## 2019-04-03 PROCEDURE — 99282 EMERGENCY DEPT VISIT SF MDM: CPT | Mod: Z6 | Performed by: EMERGENCY MEDICINE

## 2019-04-03 PROCEDURE — 99282 EMERGENCY DEPT VISIT SF MDM: CPT | Performed by: EMERGENCY MEDICINE

## 2019-04-03 NOTE — ED PROVIDER NOTES
[facility] EMERGENCY DEPARTMENT ([facility])    4/03/19       History     Chief Complaint   Patient presents with     Fall     slipped and fell onto R knee and jerked head/neck backwards. Having pain in neck and back.     The history is provided by the patient and medical records.     Jake Brothers is a 44 year old male who has a PMHx of type 1 diabetes mellitus, who presents to the Emergency Department for evaluation of neck and back pain following a mechanical slip and fall.  Patient states that he slipped in a patient's room and struck his right knee on the ground.  He is complaining of mild right knee discomfort as well as some neck and lower back discomfort.  He denies any headache, numbness or tingling in his extremities.  He states he is able to ambulate without issue.  He denies any chronic neck or back problems in the past.  He states that he has been able to ambulate with his knee without discomfort.    I have reviewed the Medications, Allergies, Past Medical and Surgical History, and Social History in the Backand system.    Past Medical History:   Diagnosis Date     Bronchitis, chronic, simple (H)      Obesity        Past Surgical History:   Procedure Laterality Date     C ORAL SURGERY PROCEDURE  1994    [place] Teeth Extraction       Family History   Problem Relation Age of Onset     Hypertension Father      Alcohol/Drug Father      Depression Father      Heart Disease Father      Depression Mother      Diabetes Paternal Uncle      Diabetes Paternal Uncle      Allergies Sister         pcn     Cancer No family hx of         No family history of skin cancer       Social History     Tobacco Use     Smoking status: Former Smoker     Smokeless tobacco: Never Used   Substance Use Topics     Alcohol use: No     Comment: quit 03/2017       No current facility-administered medications for this encounter.      Current Outpatient Medications   Medication     albuterol (PROAIR HFA, PROVENTIL HFA, VENTOLIN HFA)  108 (90 BASE) MCG/ACT inhaler     atorvastatin (LIPITOR) 20 MG tablet     Continuous Blood Gluc  (FREESTYLE CARI 14 DAY READER) MIKAELA     Continuous Blood Gluc Sensor (FREESTYLE CARI 14 DAY SENSOR) MISC     continuous blood glucose monitoring (FREESTYLE CARI) sensor     glucagon (GLUCAGON EMERGENCY) 1 MG kit     insulin aspart (NOVOLOG FLEXPEN) 100 UNIT/ML pen     insulin glargine (LANTUS SOLOSTAR PEN) 100 UNIT/ML pen     insulin pen needle 32G X 4 MM     Multiple Vitamins-Minerals (MULTIVITAMIN ADULT PO)      No Known Allergies     Review of Systems    Physical Exam   BP: (!) 123/96  Pulse: 100  Temp: 98.2  F (36.8  C)  Resp: 16  SpO2: 96 %      Physical Exam   Constitutional: He is oriented to person, place, and time. He appears well-developed and well-nourished. No distress.   HENT:   Head: Normocephalic and atraumatic.   Eyes: EOM are normal. Pupils are equal, round, and reactive to light. No scleral icterus.   Neck: Normal range of motion and full passive range of motion without pain. Neck supple. No spinous process tenderness and no muscular tenderness present. Normal range of motion present.   Cardiovascular: Normal rate.   Pulmonary/Chest: Effort normal. No respiratory distress. He exhibits no tenderness.   Abdominal: Soft. There is no tenderness.   Musculoskeletal: Normal range of motion. He exhibits no tenderness.        Cervical back: He exhibits no tenderness.        Thoracic back: He exhibits no tenderness.        Lumbar back: He exhibits no tenderness.   Neurological: He is alert and oriented to person, place, and time.   Skin: Skin is warm and dry. Capillary refill takes less than 2 seconds. No abrasion, no laceration and no rash noted. He is not diaphoretic.       ED Course   6:20 PM  The patient was seen and examined by Balaji Ruby MD in Room HW03.       Procedures                  Labs Ordered and Resulted from Time of ED Arrival Up to the Time of Departure from the ED - No data to  display         Assessments & Plan (with Medical Decision Making)   This is a 44-year-old male coming into the emergency room with complaints of mild neck and lower back pain as well as right knee discomfort.  His physical exam is for the most part unremarkable.  He has no signs of pain on ranging his neck or back.  He has no signs of cauda equina.  He has no midline tenderness.  His knee is also grossly unremarkable as he has no tenderness or deformity.  There is no ecchymosis or signs of contusion.  At this time I believe he has minor muscle strain in his neck and lower back.  His knee is unremarkable and does not need imaging at this time.  I believe he can be safely discharged with instructions to utilize Motrin and Tylenol for discomfort and follow-up with his primary care for any further concerns.  Patient understands and agrees with discharge instructions and aftercare planning.    I have reviewed the nursing notes.    I have reviewed the findings, diagnosis, plan and need for follow up with the patient.       Medication List      There are no discharge medications for this visit.         Final diagnoses:   None       4/3/2019   [facility], [place], EMERGENCY DEPARTMENT     Balaji Ruby MD  04/04/19 0045

## 2019-04-03 NOTE — ED AVS SNAPSHOT
[facility], [place], Emergency Department  [address]  [facility] [address]  Phone:  [phone]  Fax:  [phone]                                    Jake Brothers   MRN: [id number]    Department:  [facility], Emergency Department   Date of Visit:  4/3/2019           After Visit Summary Signature Page    I have received my discharge instructions, and my questions have been answered. I have discussed any challenges I see with this plan with the nurse or doctor.    ..........................................................................................................................................  Patient/Patient Representative Signature      ..........................................................................................................................................  Patient Representative Print Name and Relationship to Patient    ..................................................               ................................................  Date                                   Time    ..........................................................................................................................................  Reviewed by Signature/Title    ...................................................              ..............................................  Date                                               Time          22EPIC Rev 08/18

## 2019-04-09 ENCOUNTER — OFFICE VISIT (OUTPATIENT)
Dept: ENDOCRINOLOGY | Facility: CLINIC | Age: 44
End: 2019-04-09
Payer: COMMERCIAL

## 2019-04-09 ENCOUNTER — ALLIED HEALTH/NURSE VISIT (OUTPATIENT)
Dept: EDUCATION SERVICES | Facility: CLINIC | Age: 44
End: 2019-04-09
Payer: COMMERCIAL

## 2019-04-09 VITALS
SYSTOLIC BLOOD PRESSURE: 128 MMHG | HEIGHT: 70 IN | HEART RATE: 108 BPM | WEIGHT: 217.8 LBS | DIASTOLIC BLOOD PRESSURE: 83 MMHG | BODY MASS INDEX: 31.18 KG/M2

## 2019-04-09 DIAGNOSIS — E10.9 TYPE 1 DIABETES MELLITUS WITHOUT COMPLICATION (H): Primary | ICD-10-CM

## 2019-04-09 DIAGNOSIS — E10.65 TYPE 1 DIABETES MELLITUS WITH HYPERGLYCEMIA (H): Primary | ICD-10-CM

## 2019-04-09 LAB — HBA1C MFR BLD: 7.5 % (ref 4.3–6)

## 2019-04-09 ASSESSMENT — MIFFLIN-ST. JEOR: SCORE: 1889.18

## 2019-04-09 ASSESSMENT — PAIN SCALES - GENERAL: PAINLEVEL: NO PAIN (0)

## 2019-04-09 NOTE — NURSING NOTE
Chief Complaint   Patient presents with     RECHECK     Type 1 Diabetes     Capillary puncture performed for Hemoglobin A1C test. Patient tolerated well.    Laly Post MA

## 2019-04-09 NOTE — PATIENT INSTRUCTIONS
1. Continue Lantus 30 units at bedtime.  2. ON WORK DAYS only take 28 units Lantus at bedtime.  3. No change in Novolog I/C ratio today.  4. See diabetes educator to discuss use of insulin pump / sensor.  5. See me in 3 months.  Bety Dacosta PA-C

## 2019-04-09 NOTE — PROGRESS NOTES
HPI  Jake Brothers is a 44 year old male with type 1 diabetes mellitus here today for a follow-up visit.  Jake was diagnosed with type 1 diabetes mellitus in March 2018.  Pt's paternal uncle has hx of type 1 diabetes.  Jake's history is significant for celiac, vitiligo, obesity and hx of hepatic steatosis.  Pt has no known history of retinopathy, nephropathy or neuropathy.  For his diabetes, he is currently taking Lantus 30 units SQ at bedtime and NovoLog 1 unit / 10 gms CHO with meals and snacks with correction insulin ( 1 unit/50 for BG > 150 ).  Patient's A1C is 7.7 % today.  His previous A1C was 7.9 %.  His A1C at time of dx was > 15 % in March 2018.  We downloaded his Freestyle Sukhi device today and his average glucose was 180 with SD 59 over the past 14 days.  He has hypoglycemia during the evening following his work.  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  On ROS today, Jake has gained weight.  He denies frequent headaches, blurred vision, nausea, vomiting, shortness of breath at rest or chronic cough.  He denies chest pain, abdominal pain, diarrhea, dysuria, hematuria or foot ulcers.  Patient has no numbness tingling or pain in his toes or fingers.  He continues to follow a gluten-free diet without diarrhea.      Diabetes Care  Retinopathy:none; pt seen by Oph in 3/2018.  Nephropathy:none; urine microalbuminuria was + at time when his A1C was > 15 %. Recheck urine microalbuminuria was negative in Oct 2018.  Neuropathy:none.  Foot Exam: no ulcers.  Taking aspirin: no.  Lipids: LDL 73 in Oct 2018. Pt is taking Lipitor daily.    ROS  Please see under history of present illness.    Allergies  No Known Allergies    Medications  Current Outpatient Medications   Medication Sig Dispense Refill     albuterol (PROAIR HFA, PROVENTIL HFA, VENTOLIN HFA) 108 (90 BASE) MCG/ACT inhaler Inhale 2 puffs into the lungs every 6 hours as needed for shortness of breath / dyspnea or  "wheezing 1 Inhaler 0     atorvastatin (LIPITOR) 20 MG tablet Take 1 tablet (20 mg) by mouth daily 90 tablet 3     Continuous Blood Gluc  (FREESTYLE CARI 14 DAY READER) MIKAELA 1 Device 5 times daily 1 Device 0     Continuous Blood Gluc Sensor (FREESTYLE CARI 14 DAY SENSOR) MISC 1 Device every 14 days 7 each 3     continuous blood glucose monitoring (FREESTYLE CARI) sensor For use with Freestyle Cari Flash  . Replace sensor every 10 days. 3 each 11     insulin aspart (NOVOLOG FLEXPEN) 100 UNIT/ML pen 1 unit per 8  grams of carbohydrates + correction with meals  TID  approx  35 units daily. 30 mL 3     insulin glargine (LANTUS SOLOSTAR PEN) 100 UNIT/ML pen Inject 30 units subcutaneous at bedtime. ON WORK DAYS inject only 28 units at bedtime. 15 mL 3     insulin pen needle 32G X 4 MM Use 6 pen needles daily or as directed. 200 each 11     Multiple Vitamins-Minerals (MULTIVITAMIN ADULT PO)        glucagon (GLUCAGON EMERGENCY) 1 MG kit Inject 1 mg into the muscle once for 1 dose 1 mg 1       Family History  family history includes Alcohol/Drug in his father; Allergies in his sister; Depression in his father and mother; Diabetes in his paternal uncle and paternal uncle; Heart Disease in his father; Hypertension in his father.    Social History   reports that he has quit smoking. He has never used smokeless tobacco. He reports that he does not drink alcohol or use drugs.     Past Medical History  Past Medical History:   Diagnosis Date     Bronchitis, chronic, simple (H)      Obesity        Past Surgical History:   Procedure Laterality Date     C ORAL SURGERY PROCEDURE  1994    [place] Teeth Extraction       Physical Exam  /83   Pulse 108   Ht 1.778 m (5' 10\")   Wt 98.8 kg (217 lb 12.8 oz)   BMI 31.25 kg/m    Body mass index is 31.25 kg/m .    GENERAL : In no apparent distress    RESULTS  Creatinine   Date Value Ref Range Status   10/05/2018 0.56 (L) 0.66 - 1.25 mg/dL Final     GFR Estimate   Date " Value Ref Range Status   10/05/2018 >90 >60 mL/min/1.7m2 Final     Comment:     Non  GFR Calc     Hemoglobin A1C   Date Value Ref Range Status   05/14/2018 8.1 (H) 0 - 5.6 % Final     Comment:     Normal <5.7% Prediabetes 5.7-6.4%  Diabetes 6.5% or higher - adopted from ADA   consensus guidelines.       Potassium   Date Value Ref Range Status   10/05/2018 4.1 3.4 - 5.3 mmol/L Final     ALT   Date Value Ref Range Status   10/05/2018 41 0 - 70 U/L Final     AST   Date Value Ref Range Status   10/05/2018 20 0 - 45 U/L Final     TSH   Date Value Ref Range Status   10/05/2018 0.48 0.40 - 4.00 mU/L Final       Cholesterol   Date Value Ref Range Status   10/05/2018 124 <200 mg/dL Final   03/05/2018 202 (H) <200 mg/dL Final     Comment:     Desirable:       <200 mg/dl     HDL Cholesterol   Date Value Ref Range Status   10/05/2018 39 (L) >39 mg/dL Final   03/05/2018 25 (L) >39 mg/dL Final     LDL Cholesterol Calculated   Date Value Ref Range Status   10/05/2018 73 <100 mg/dL Final     Comment:     Desirable:       <100 mg/dl   03/05/2018 108 (H) <100 mg/dL Final     Comment:     Above desirable:  100-129 mg/dl  Borderline High:  130-159 mg/dL  High:             160-189 mg/dL  Very high:       >189 mg/dl       Triglycerides   Date Value Ref Range Status   10/05/2018 59 <150 mg/dL Final     Comment:     Fasting specimen   03/05/2018 345 (H) <150 mg/dL Final     Comment:     Borderline high:  150-199 mg/dl  High:             200-499 mg/dl  Very high:       >499 mg/dl  Fasting specimen       Cholesterol/HDL Ratio   Date Value Ref Range Status   09/25/2014 4.5 0.0 - 5.0 Final   02/23/2009 6.8 (H) 0.0 - 5.0 Final     A1C   7.5   4/9/2019  A1C   7.9   1/8/2019  A1C   6.4 % 9/2018  A1C   15  % in 3/2018.    ASSESSMENT/PLAN:    1. TYPE 1 DIABETES MELLITUS: Pt dx having type 1 diabetes mellitus in March 2018.  He has gained weight and less active over the winter months.  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  On his nonwork days, he will take Lantus 30 units at hs and continue current Novolog I/C ratio 1:10 with correction.  He is to meet with our CDE to discuss use of an insulin pump/sensor.  Pt was seen by Oph in March 2018.  His urine microalbuminuria negative in Oct 2018 with normal creat/GFR.  His feet are ok.  Pt is normotensive.  Jake had the flu vaccine this season.    2.  CELIAC: Stable on gluten free diet.    3.  Return to Endocrine Clinic to see me in 3 months.

## 2019-04-09 NOTE — PROGRESS NOTES
"Diabetes Self-Management Education & Support    Diabetes Education Self Management & Training    SUBJECTIVE/OBJECTIVE:  Presents for: Follow-up  Accompanied by: Self  Diabetes education in the past 24mo: Yes  Focus of Visit: Insulin Pump  Diabetes type: Type 1  Disease course: Stable  How confident are you filling out medical forms by yourself:: Extremely  Diabetes management related comments/concerns: considering pump and sensor  Other concerns:: None  Cultural Influences/Ethnic Background:  American      Diabetes Symptoms & Complications   none    Patient Problem List and Family Medical History reviewed for relevant medical history, current medical status, and diabetes risk factors.    Vitals:    Estimated body mass index is 31.25 kg/m  as calculated from the following:    Height as of an earlier encounter on 4/9/19: 1.778 m (5' 10\").    Weight as of an earlier encounter on 4/9/19: 98.8 kg (217 lb 12.8 oz).   Last 3 BP:   BP Readings from Last 3 Encounters:   04/09/19 128/83   04/03/19 (!) 123/96   01/08/19 142/89       History   Smoking Status     Former Smoker   Smokeless Tobacco     Never Used       Labs:  Lab Results   Component Value Date    A1C 8.1 05/14/2018     Lab Results   Component Value Date    GLC 80 04/24/2018     Lab Results   Component Value Date    LDL 73 10/05/2018     HDL Cholesterol   Date Value Ref Range Status   10/05/2018 39 (L) >39 mg/dL Final   ]  GFR Estimate   Date Value Ref Range Status   10/05/2018 >90 >60 mL/min/1.7m2 Final     Comment:     Non  GFR Calc     GFR Estimate If Black   Date Value Ref Range Status   10/05/2018 >90 >60 mL/min/1.7m2 Final     Comment:      GFR Calc     Lab Results   Component Value Date    CR 0.56 10/05/2018     No results found for: MICROALBUMIN    Healthy Eating  Carb counts his food, is trying to eat no carbs on work days because he is too busy to bolus and feels he would drop low if he did    Being Active  Very active at " "work    Monitoring  Using the Sukhi and checking up to 16 times per day    Taking Medications  Diabetes Medication(s)     Diabetic Other       glucagon (GLUCAGON EMERGENCY) 1 MG kit    Inject 1 mg into the muscle once for 1 dose    Insulin       insulin aspart (NOVOLOG FLEXPEN) 100 UNIT/ML pen    1 unit per 8  grams of carbohydrates + correction with meals  TID  approx  35 units daily.     insulin glargine (LANTUS SOLOSTAR PEN) 100 UNIT/ML pen    Inject 30 units subcutaneous at bedtime. ON WORK DAYS inject only 28 units at bedtime.        Problem Solving   lowering Lantus on work days which should prevent the lows he gets after work, he is high during his whole 12 hour shift though.  Advised that not eating any carbs on these days could be making him go higher.  He will work on this.    Healthy Coping     Patient Activation Measure Survey Score:  SALINA Score (Last Two) 2013   SALINA Raw Score 43   Activation Score 68.5   SALINA Level 4       ASSESSMENT:  Type 1 diabetes, interested in a pump, good pump candidate    Patient's most recent   Lab Results   Component Value Date    A1C 8.1 2018    is not meeting goal of <7.0    INTERVENTION:      Reviewed and demonstrated operation of the following pumps:  The Omnipod 400, Medtronic 670G with Guardian 3 sensor, and Tandem X2 with Dexcom G5 sensor.     Discussed unique features of each pump and what qualities are most important to patient.  Discussion included the operation of the 670G Hybrid Closed Loop system and the particular behaviors around that pump that need to be adhered to, including using the bolus calculator, counting carbohydrates accurately, calibrating the sensor appropriately.  Discussed that the results seen in the studies of the system that were done were a result of staying in \"auto\" mode > 85% of the time.      Explained the upgrade process, which includes making sure that warranty has  on the current pump, completing the CMN, processing " through the pump company and approval by insurance company.  Also reviewed training that would be necessary to ensure safe operation of the pump.       Submitted AOB on T:Slim X2 insulin pump today.      PLAN:  See Patient Instructions for co-developed, patient-stated behavior change goals.  AVS printed and provided to patient today. See Follow-Up section for recommended follow-up.    Time Spent: 60 minutes  Encounter Type: Individual    Any diabetes medication dose changes were made via the CDE Protocol and Collaborative Practice Agreement with the patient's referring provider. A copy of this encounter was shared with the provider.

## 2019-04-09 NOTE — PATIENT INSTRUCTIONS
1. Expect a call from [employer] and DexCom.    2.  Call when you hear your devices are being shipped to schedule a pump start appointment.    Krsitin Stockton RN,CDE  [address] [address]  Phone: [phone]  [email or web address]

## 2019-04-09 NOTE — LETTER
4/9/2019       RE: Jake Brothers  [address]  [facility] [address]     Dear Colleague,    Thank you for referring your patient, Jake Brothers, to the [facility] ENDOCRINOLOGY at [facility]. Please see a copy of my visit note below.    HPI  Jake Brothers is a 44 year old male with type 1 diabetes mellitus here today for a follow-up visit.  Jake was diagnosed with type 1 diabetes mellitus in March 2018.  Pt's paternal uncle has hx of type 1 diabetes.  Jake's history is significant for celiac, vitiligo, obesity and hx of hepatic steatosis.  Pt has no known history of retinopathy, nephropathy or neuropathy.  For his diabetes, he is currently taking Lantus 30 units SQ at bedtime and NovoLog 1 unit / 10 gms CHO with meals and snacks with correction insulin ( 1 unit/50 for BG > 150 ).  Patient's A1C is 7.7 % today.  His previous A1C was 7.9 %.  His A1C at time of dx was > 15 % in March 2018.  We downloaded his Freestyle Sukhi device today and his average glucose was 180 with SD 59 over the past 14 days.  He has hypoglycemia during the evening following his work.  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  On ROS today, Jake has gained weight.  He denies frequent headaches, blurred vision, nausea, vomiting, shortness of breath at rest or chronic cough.  He denies chest pain, abdominal pain, diarrhea, dysuria, hematuria or foot ulcers.  Patient has no numbness tingling or pain in his toes or fingers.  He continues to follow a gluten-free diet without diarrhea.      Diabetes Care  Retinopathy:none; pt seen by Oph in 3/2018.  Nephropathy:none; urine microalbuminuria was + at time when his A1C was > 15 %. Recheck urine microalbuminuria was negative in Oct 2018.  Neuropathy:none.  Foot Exam: no ulcers.  Taking aspirin: no.  Lipids: LDL 73 in Oct 2018. Pt is taking Lipitor daily.    ROS  Please see under history of present  illness.    Allergies  No Known Allergies    Medications  Current Outpatient Medications   Medication Sig Dispense Refill     albuterol (PROAIR HFA, PROVENTIL HFA, VENTOLIN HFA) 108 (90 BASE) MCG/ACT inhaler Inhale 2 puffs into the lungs every 6 hours as needed for shortness of breath / dyspnea or wheezing 1 Inhaler 0     atorvastatin (LIPITOR) 20 MG tablet Take 1 tablet (20 mg) by mouth daily 90 tablet 3     Continuous Blood Gluc  (FREESTYLE CARI 14 DAY READER) MIKAELA 1 Device 5 times daily 1 Device 0     Continuous Blood Gluc Sensor (FREESTYLE CARI 14 DAY SENSOR) MISC 1 Device every 14 days 7 each 3     continuous blood glucose monitoring (FREESTYLE CARI) sensor For use with Freestyle Cari Flash  . Replace sensor every 10 days. 3 each 11     insulin aspart (NOVOLOG FLEXPEN) 100 UNIT/ML pen 1 unit per 8  grams of carbohydrates + correction with meals  TID  approx  35 units daily. 30 mL 3     insulin glargine (LANTUS SOLOSTAR PEN) 100 UNIT/ML pen Inject 30 units subcutaneous at bedtime. ON WORK DAYS inject only 28 units at bedtime. 15 mL 3     insulin pen needle 32G X 4 MM Use 6 pen needles daily or as directed. 200 each 11     Multiple Vitamins-Minerals (MULTIVITAMIN ADULT PO)        glucagon (GLUCAGON EMERGENCY) 1 MG kit Inject 1 mg into the muscle once for 1 dose 1 mg 1       Family History  family history includes Alcohol/Drug in his father; Allergies in his sister; Depression in his father and mother; Diabetes in his paternal uncle and paternal uncle; Heart Disease in his father; Hypertension in his father.    Social History   reports that he has quit smoking. He has never used smokeless tobacco. He reports that he does not drink alcohol or use drugs.     Past Medical History  Past Medical History:   Diagnosis Date     Bronchitis, chronic, simple (H)      Obesity        Past Surgical History:   Procedure Laterality Date     C ORAL SURGERY PROCEDURE  1994    [place] Teeth Extraction  "      Physical Exam  /83   Pulse 108   Ht 1.778 m (5' 10\")   Wt 98.8 kg (217 lb 12.8 oz)   BMI 31.25 kg/m     Body mass index is 31.25 kg/m .    GENERAL : In no apparent distress    RESULTS  Creatinine   Date Value Ref Range Status   10/05/2018 0.56 (L) 0.66 - 1.25 mg/dL Final     GFR Estimate   Date Value Ref Range Status   10/05/2018 >90 >60 mL/min/1.7m2 Final     Comment:     Non  GFR Calc     Hemoglobin A1C   Date Value Ref Range Status   05/14/2018 8.1 (H) 0 - 5.6 % Final     Comment:     Normal <5.7% Prediabetes 5.7-6.4%  Diabetes 6.5% or higher - adopted from ADA   consensus guidelines.       Potassium   Date Value Ref Range Status   10/05/2018 4.1 3.4 - 5.3 mmol/L Final     ALT   Date Value Ref Range Status   10/05/2018 41 0 - 70 U/L Final     AST   Date Value Ref Range Status   10/05/2018 20 0 - 45 U/L Final     TSH   Date Value Ref Range Status   10/05/2018 0.48 0.40 - 4.00 mU/L Final       Cholesterol   Date Value Ref Range Status   10/05/2018 124 <200 mg/dL Final   03/05/2018 202 (H) <200 mg/dL Final     Comment:     Desirable:       <200 mg/dl     HDL Cholesterol   Date Value Ref Range Status   10/05/2018 39 (L) >39 mg/dL Final   03/05/2018 25 (L) >39 mg/dL Final     LDL Cholesterol Calculated   Date Value Ref Range Status   10/05/2018 73 <100 mg/dL Final     Comment:     Desirable:       <100 mg/dl   03/05/2018 108 (H) <100 mg/dL Final     Comment:     Above desirable:  100-129 mg/dl  Borderline High:  130-159 mg/dL  High:             160-189 mg/dL  Very high:       >189 mg/dl       Triglycerides   Date Value Ref Range Status   10/05/2018 59 <150 mg/dL Final     Comment:     Fasting specimen   03/05/2018 345 (H) <150 mg/dL Final     Comment:     Borderline high:  150-199 mg/dl  High:             200-499 mg/dl  Very high:       >499 mg/dl  Fasting specimen       Cholesterol/HDL Ratio   Date Value Ref Range Status   09/25/2014 4.5 0.0 - 5.0 Final   02/23/2009 6.8 (H) 0.0 - 5.0 " Final     A1C   7.5   4/9/2019  A1C   7.9   1/8/2019  A1C   6.4 % 9/2018  A1C   15  % in 3/2018.    ASSESSMENT/PLAN:    1. TYPE 1 DIABETES MELLITUS: Pt dx having type 1 diabetes mellitus in March 2018.  He has gained weight and less active over the winter months.  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  On his nonwork days, he will take Lantus 30 units at hs and continue current Novolog I/C ratio 1:10 with correction.  He is to meet with our CDE to discuss use of an insulin pump/sensor.  Pt was seen by Oph in March 2018.  His urine microalbuminuria negative in Oct 2018 with normal creat/GFR.  His feet are ok.  Pt is normotensive.  Jake had the flu vaccine this season.    2.  CELIAC: Stable on gluten free diet.    3.  Return to Endocrine Clinic to see me in 3 months.         Again, thank you for allowing me to participate in the care of your patient.      Sincerely,    Bety Dacosta PA-C

## 2019-04-12 ENCOUNTER — TELEPHONE (OUTPATIENT)
Dept: EDUCATION SERVICES | Facility: CLINIC | Age: 44
End: 2019-04-12

## 2019-04-16 ENCOUNTER — MEDICAL CORRESPONDENCE (OUTPATIENT)
Dept: HEALTH INFORMATION MANAGEMENT | Facility: CLINIC | Age: 44
End: 2019-04-16

## 2019-04-16 ENCOUNTER — TELEPHONE (OUTPATIENT)
Dept: ENDOCRINOLOGY | Facility: CLINIC | Age: 44
End: 2019-04-16

## 2019-07-09 ENCOUNTER — OFFICE VISIT (OUTPATIENT)
Dept: ENDOCRINOLOGY | Facility: CLINIC | Age: 44
End: 2019-07-09
Payer: COMMERCIAL

## 2019-07-09 VITALS
WEIGHT: 216.5 LBS | BODY MASS INDEX: 31.06 KG/M2 | SYSTOLIC BLOOD PRESSURE: 125 MMHG | HEART RATE: 81 BPM | DIASTOLIC BLOOD PRESSURE: 81 MMHG

## 2019-07-09 DIAGNOSIS — E10.9 TYPE 1 DIABETES MELLITUS WITHOUT COMPLICATION (H): Primary | ICD-10-CM

## 2019-07-09 LAB — HBA1C MFR BLD: 7.6 % (ref 4.3–6)

## 2019-07-09 ASSESSMENT — PAIN SCALES - GENERAL: PAINLEVEL: NO PAIN (0)

## 2019-07-09 NOTE — PROGRESS NOTES
HPI  Jake Brothers is a 44 year old male with type 1 diabetes mellitus here today for a follow-up visit.  Jake was diagnosed with type 1 diabetes mellitus in March 2018.  Pt's paternal uncle has hx of type 1 diabetes.  Jake's history is significant for celiac, vitiligo, obesity and hx of hepatic steatosis.  Pt has no known history of retinopathy, nephropathy or neuropathy.  For his diabetes, he is currently taking Lantus 28-30 units SQ at bedtime and NovoLog 1 unit / 8 gms CHO with meals and snacks with correction insulin ( 1 unit/50 for BG > 150 ).  Patient's A1C is 7.6 % today.  His previous A1C was 7.7 %.  His A1C at time of dx was > 15 % in March 2018.  We downloaded his Freestyle Sukhi device today, but there was no data on the Freestyle device.  He tells me he has an appointment with our CDE this Friday to start training to use an insulin pump Tandem pump and Dexcom G6.  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  On ROS today, Jake has gained weight.  He denies frequent headaches, blurred vision, nausea, vomiting, shortness of breath at rest or chronic cough.  He denies chest pain, abdominal pain, diarrhea, dysuria, hematuria or foot ulcers.  Patient has no numbness tingling or pain in his toes or fingers.  He continues to follow a gluten-free diet given his hx of celiac disease.    Diabetes Care  Retinopathy:none; pt seen by Oph in 3/2018.  Nephropathy:none; urine microalbuminuria was + at time when his A1C was > 15 %. Recheck urine microalbuminuria was negative in Oct 2018.  Neuropathy:none.  Foot Exam: no ulcers.  Taking aspirin: no.  Lipids: LDL 73 in Oct 2018. Pt is taking Lipitor daily.    ROS  Please see under history of present illness.    Allergies  No Known Allergies    Medications  Current Outpatient Medications   Medication Sig Dispense Refill     albuterol (PROAIR HFA, PROVENTIL HFA, VENTOLIN HFA) 108 (90 BASE) MCG/ACT inhaler Inhale 2 puffs into the lungs  every 6 hours as needed for shortness of breath / dyspnea or wheezing 1 Inhaler 0     atorvastatin (LIPITOR) 20 MG tablet Take 1 tablet (20 mg) by mouth daily 90 tablet 3     Continuous Blood Gluc  (FREESTYLE CARI 14 DAY READER) MIKAELA 1 Device 5 times daily 1 Device 0     Continuous Blood Gluc Sensor (FREESTYLE CARI 14 DAY SENSOR) MISC 1 Device every 14 days 7 each 3     continuous blood glucose monitoring (FREESTYLE CARI) sensor For use with Freestyle Cari Flash  . Replace sensor every 10 days. 3 each 11     insulin aspart (NOVOLOG FLEXPEN) 100 UNIT/ML pen 1 unit per 8  grams of carbohydrates + correction with meals  TID  approx  35 units daily. 30 mL 3     insulin glargine (LANTUS SOLOSTAR PEN) 100 UNIT/ML pen Inject 30 units subcutaneous at bedtime. ON WORK DAYS inject only 28 units at bedtime. 15 mL 3     insulin pen needle (BD ROVERTO U/F) 32G X 4 MM miscellaneous USE SIX PEN NEEDLES DAILY OR AS DIRECTED 600 each 3     Multiple Vitamins-Minerals (MULTIVITAMIN ADULT PO)        glucagon (GLUCAGON EMERGENCY) 1 MG kit Inject 1 mg into the muscle once for 1 dose 1 mg 1       Family History  family history includes Alcohol/Drug in his father; Allergies in his sister; Depression in his father and mother; Diabetes in his paternal uncle and paternal uncle; Heart Disease in his father; Hypertension in his father.    Social History   reports that he has quit smoking. He has never used smokeless tobacco. He reports that he does not drink alcohol or use drugs.     Past Medical History  Past Medical History:   Diagnosis Date     Bronchitis, chronic, simple (H)      Obesity        Past Surgical History:   Procedure Laterality Date     C ORAL SURGERY PROCEDURE  1994    [place] Teeth Extraction       Physical Exam  /81   Pulse 81   Wt 98.2 kg (216 lb 8 oz)   BMI 31.06 kg/m    Body mass index is 31.06 kg/m .    GENERAL : In no apparent distress    RESULTS  Creatinine   Date Value Ref Range Status    10/05/2018 0.56 (L) 0.66 - 1.25 mg/dL Final     GFR Estimate   Date Value Ref Range Status   10/05/2018 >90 >60 mL/min/1.7m2 Final     Comment:     Non  GFR Calc     Hemoglobin A1C   Date Value Ref Range Status   05/14/2018 8.1 (H) 0 - 5.6 % Final     Comment:     Normal <5.7% Prediabetes 5.7-6.4%  Diabetes 6.5% or higher - adopted from ADA   consensus guidelines.       Potassium   Date Value Ref Range Status   10/05/2018 4.1 3.4 - 5.3 mmol/L Final     ALT   Date Value Ref Range Status   10/05/2018 41 0 - 70 U/L Final     AST   Date Value Ref Range Status   10/05/2018 20 0 - 45 U/L Final     TSH   Date Value Ref Range Status   10/05/2018 0.48 0.40 - 4.00 mU/L Final       Cholesterol   Date Value Ref Range Status   10/05/2018 124 <200 mg/dL Final   03/05/2018 202 (H) <200 mg/dL Final     Comment:     Desirable:       <200 mg/dl     HDL Cholesterol   Date Value Ref Range Status   10/05/2018 39 (L) >39 mg/dL Final   03/05/2018 25 (L) >39 mg/dL Final     LDL Cholesterol Calculated   Date Value Ref Range Status   10/05/2018 73 <100 mg/dL Final     Comment:     Desirable:       <100 mg/dl   03/05/2018 108 (H) <100 mg/dL Final     Comment:     Above desirable:  100-129 mg/dl  Borderline High:  130-159 mg/dL  High:             160-189 mg/dL  Very high:       >189 mg/dl       Triglycerides   Date Value Ref Range Status   10/05/2018 59 <150 mg/dL Final     Comment:     Fasting specimen   03/05/2018 345 (H) <150 mg/dL Final     Comment:     Borderline high:  150-199 mg/dl  High:             200-499 mg/dl  Very high:       >499 mg/dl  Fasting specimen       Cholesterol/HDL Ratio   Date Value Ref Range Status   09/25/2014 4.5 0.0 - 5.0 Final   02/23/2009 6.8 (H) 0.0 - 5.0 Final       A1C   7.6  % today.    ASSESSMENT/PLAN:    1. TYPE 1 DIABETES MELLITUS: Pt dx having type 1 diabetes mellitus in March 2018.  He will be seeing our CDE later this week to be trained how to use the Tandem insulin pump and Dexcom  G6.  No change in insulin doses today.  Pt was seen by Oph in March 2018.Will schedule pt to see Oph this year.  His urine microalbuminuria negative in Oct 2018 with normal creat/GFR.  His feet are ok.  Pt is normotensive.  Pt's TSH was normal in Oct 2018.    2.  CELIAC: Stable on gluten free diet.    3.  HYPERLIPIDEMIA: LDL 73 in Oct 2018 on Lipitor.    4.  Return to Endocrine Clinic to see me in 3 months.

## 2019-07-09 NOTE — LETTER
7/9/2019       RE: Jake Brothers  [address]  [facility] [address]     Dear Colleague,    Thank you for referring your patient, Jake Brothers, to the [facility] ENDOCRINOLOGY at [facility]. Please see a copy of my visit note below.    HPI  Jake Brothers is a 44 year old male with type 1 diabetes mellitus here today for a follow-up visit.  Jake was diagnosed with type 1 diabetes mellitus in March 2018.  Pt's paternal uncle has hx of type 1 diabetes.  Jake's history is significant for celiac, vitiligo, obesity and hx of hepatic steatosis.  Pt has no known history of retinopathy, nephropathy or neuropathy.  For his diabetes, he is currently taking Lantus 28-30 units SQ at bedtime and NovoLog 1 unit / 8 gms CHO with meals and snacks with correction insulin ( 1 unit/50 for BG > 150 ).  Patient's A1C is 7.6 % today.  His previous A1C was 7.7 %.  His A1C at time of dx was > 15 % in March 2018.  We downloaded his Freestyle Sukhi device today, but there was no data on the Freestyle device.  He tells me he has an appointment with our CDE this Friday to start training to use an insulin pump Tandem pump and Dexcom G6.  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  On ROS today, Jake has gained weight.  He denies frequent headaches, blurred vision, nausea, vomiting, shortness of breath at rest or chronic cough.  He denies chest pain, abdominal pain, diarrhea, dysuria, hematuria or foot ulcers.  Patient has no numbness tingling or pain in his toes or fingers.  He continues to follow a gluten-free diet given his hx of celiac disease.    Diabetes Care  Retinopathy:none; pt seen by Oph in 3/2018.  Nephropathy:none; urine microalbuminuria was + at time when his A1C was > 15 %. Recheck urine microalbuminuria was negative in Oct 2018.  Neuropathy:none.  Foot Exam: no ulcers.  Taking aspirin: no.  Lipids: LDL 73 in Oct 2018. Pt is taking Lipitor  daily.    ROS  Please see under history of present illness.    Allergies  No Known Allergies    Medications  Current Outpatient Medications   Medication Sig Dispense Refill     albuterol (PROAIR HFA, PROVENTIL HFA, VENTOLIN HFA) 108 (90 BASE) MCG/ACT inhaler Inhale 2 puffs into the lungs every 6 hours as needed for shortness of breath / dyspnea or wheezing 1 Inhaler 0     atorvastatin (LIPITOR) 20 MG tablet Take 1 tablet (20 mg) by mouth daily 90 tablet 3     Continuous Blood Gluc  (FREESTYLE CARI 14 DAY READER) MIKAELA 1 Device 5 times daily 1 Device 0     Continuous Blood Gluc Sensor (FREESTYLE CARI 14 DAY SENSOR) MISC 1 Device every 14 days 7 each 3     continuous blood glucose monitoring (FREESTYLE CARI) sensor For use with Freestyle Cari Flash  . Replace sensor every 10 days. 3 each 11     insulin aspart (NOVOLOG FLEXPEN) 100 UNIT/ML pen 1 unit per 8  grams of carbohydrates + correction with meals  TID  approx  35 units daily. 30 mL 3     insulin glargine (LANTUS SOLOSTAR PEN) 100 UNIT/ML pen Inject 30 units subcutaneous at bedtime. ON WORK DAYS inject only 28 units at bedtime. 15 mL 3     insulin pen needle (BD ROVERTO U/F) 32G X 4 MM miscellaneous USE SIX PEN NEEDLES DAILY OR AS DIRECTED 600 each 3     Multiple Vitamins-Minerals (MULTIVITAMIN ADULT PO)        glucagon (GLUCAGON EMERGENCY) 1 MG kit Inject 1 mg into the muscle once for 1 dose 1 mg 1       Family History  family history includes Alcohol/Drug in his father; Allergies in his sister; Depression in his father and mother; Diabetes in his paternal uncle and paternal uncle; Heart Disease in his father; Hypertension in his father.    Social History   reports that he has quit smoking. He has never used smokeless tobacco. He reports that he does not drink alcohol or use drugs.     Past Medical History  Past Medical History:   Diagnosis Date     Bronchitis, chronic, simple (H)      Obesity        Past Surgical History:   Procedure Laterality  Date     C ORAL SURGERY PROCEDURE  1994    [place] Teeth Extraction       Physical Exam  /81   Pulse 81   Wt 98.2 kg (216 lb 8 oz)   BMI 31.06 kg/m     Body mass index is 31.06 kg/m .    GENERAL : In no apparent distress    RESULTS  Creatinine   Date Value Ref Range Status   10/05/2018 0.56 (L) 0.66 - 1.25 mg/dL Final     GFR Estimate   Date Value Ref Range Status   10/05/2018 >90 >60 mL/min/1.7m2 Final     Comment:     Non  GFR Calc     Hemoglobin A1C   Date Value Ref Range Status   05/14/2018 8.1 (H) 0 - 5.6 % Final     Comment:     Normal <5.7% Prediabetes 5.7-6.4%  Diabetes 6.5% or higher - adopted from ADA   consensus guidelines.       Potassium   Date Value Ref Range Status   10/05/2018 4.1 3.4 - 5.3 mmol/L Final     ALT   Date Value Ref Range Status   10/05/2018 41 0 - 70 U/L Final     AST   Date Value Ref Range Status   10/05/2018 20 0 - 45 U/L Final     TSH   Date Value Ref Range Status   10/05/2018 0.48 0.40 - 4.00 mU/L Final       Cholesterol   Date Value Ref Range Status   10/05/2018 124 <200 mg/dL Final   03/05/2018 202 (H) <200 mg/dL Final     Comment:     Desirable:       <200 mg/dl     HDL Cholesterol   Date Value Ref Range Status   10/05/2018 39 (L) >39 mg/dL Final   03/05/2018 25 (L) >39 mg/dL Final     LDL Cholesterol Calculated   Date Value Ref Range Status   10/05/2018 73 <100 mg/dL Final     Comment:     Desirable:       <100 mg/dl   03/05/2018 108 (H) <100 mg/dL Final     Comment:     Above desirable:  100-129 mg/dl  Borderline High:  130-159 mg/dL  High:             160-189 mg/dL  Very high:       >189 mg/dl       Triglycerides   Date Value Ref Range Status   10/05/2018 59 <150 mg/dL Final     Comment:     Fasting specimen   03/05/2018 345 (H) <150 mg/dL Final     Comment:     Borderline high:  150-199 mg/dl  High:             200-499 mg/dl  Very high:       >499 mg/dl  Fasting specimen       Cholesterol/HDL Ratio   Date Value Ref Range Status   09/25/2014 4.5 0.0 -  5.0 Final   02/23/2009 6.8 (H) 0.0 - 5.0 Final       A1C   7.6  % today.    ASSESSMENT/PLAN:    1. TYPE 1 DIABETES MELLITUS: Pt dx having type 1 diabetes mellitus in March 2018.  He will be seeing our CDE later this week to be trained how to use the Tandem insulin pump and Dexcom G6.  No change in insulin doses today.  Pt was seen by Oph in March 2018.Will schedule pt to see Oph this year.  His urine microalbuminuria negative in Oct 2018 with normal creat/GFR.  His feet are ok.  Pt is normotensive.  Pt's TSH was normal in Oct 2018.    2.  CELIAC: Stable on gluten free diet.    3.  HYPERLIPIDEMIA: LDL 73 in Oct 2018 on Lipitor.    4.  Return to Endocrine Clinic to see me in 3 months.         Again, thank you for allowing me to participate in the care of your patient.      Sincerely,    Bety Dacosta PA-C

## 2019-07-10 NOTE — TELEPHONE ENCOUNTER
insulin glargine (LANTUS SOLOSTAR PEN) 100 UNIT/ML pen        Last Written Prescription Date:  04/09/19  Last Fill Quantity: 15mL,   # refills: 3  Last Office Visit : 07/09/19  Future Office visit:  10/08/19    Routing refill request to provider for review/approval because:  Insulin - refilled per clinic

## 2019-07-11 ENCOUNTER — MYC MEDICAL ADVICE (OUTPATIENT)
Dept: ENDOCRINOLOGY | Facility: CLINIC | Age: 44
End: 2019-07-11

## 2019-07-17 ENCOUNTER — ALLIED HEALTH/NURSE VISIT (OUTPATIENT)
Dept: EDUCATION SERVICES | Facility: CLINIC | Age: 44
End: 2019-07-17
Payer: COMMERCIAL

## 2019-07-17 ENCOUNTER — MYC MEDICAL ADVICE (OUTPATIENT)
Dept: EDUCATION SERVICES | Facility: CLINIC | Age: 44
End: 2019-07-17

## 2019-07-17 DIAGNOSIS — E10.9 TYPE 1 DIABETES MELLITUS WITHOUT COMPLICATION (H): Primary | ICD-10-CM

## 2019-07-17 NOTE — PATIENT INSTRUCTIONS
1.  Start using your new pump.  Remember you have two profiles set up in it.  One for work day and one for day.  2.  Bolus for all of your food through the bolus calculator.    3.  Call if you have any questions or problems.    4.  Return 7/30 at 12:30pm      Kristin Stockton RN,CDE  [address] [address]  Phone: [phone]  [email or web address]

## 2019-07-17 NOTE — PROGRESS NOTES
Diabetes Self-Management Education & Support      Diabetes Self-Management Education & Support - Insulin Pump Start    SUBJECTIVE/OBJECTIVE     Cultural Influences/Ethnic Background:  American    Patient seen today for Insulin Pump Start:    Insulin Pump Information  Insulin Pump Type: Tandem t:slim X2  Pump Serial Number: [id number]  Infusion Set: Tandem  Tandem Infusion Set: Auto Soft 90    Insulin Pump Start  Insulin Pump Type: Tandem t:slim X2  Pump Serial Number: [id number]  Infusion Set: Tandem  Tandem Infusion Set: Auto Soft 90 9mm    Healthy Eating  Feels confident with carb counting    Being Active  Cuts bolus or consumes food for more activity    Monitoring  Used Sukhi until he got his DexCom    Healthy Coping     Patient Activation Measure Survey Score:  SALINA Score (Last Two) 2013   SALINA Raw Score 43   Activation Score 68.5   SALINA Level 4     ASSESSMENT  Type 1 diabetes, a1c higher than he would like    INTERVENTION:       Education provided today on:  We went through each menu and discussed each feature of the T:Slim X 2 insulin pump today.  We talked about instances in which a particular feature may be helpful.  Jake did all of the programming of the pump today.  He filled and inserted the reservoir and infusion set without difficulty.    PUMP SETTINGS: Bold font shows second profile set up for work days.  He currently uses 10 units less insulin on work days.  Start time Basal Rate U/hr ICR  1 unit/gm Correction Factor Target BG    Midnight 0.7/0.5  49/66 110/110                 Active Insulin Time: 3  Maximum Bolus: 25  Reservoir Warnin units    DEXCOM CGM SETTINGS    High Alert High Snooze Low Alert Low Snooze Rise Rate Fall Rate    240 2 hours 80 30 off 3mg/dl/min                     Opportunities for ongoing education and support in diabetes-self management were discussed.    Pt verbalized understanding of concepts discussed and recommendations provided today.       Emergency back-up plan for  pump failure  Safety Rules  Tips to remember after starting an insulin pump    PLAN  See Patient Instructions for co-developed, patient-stated behavior change goals.  AVS printed and provided to patient today. See Follow-Up section for recommended follow-up.    Time Spent: 120 minutes  Encounter Type: Individual    Any diabetes medication dose changes were made via the CDE Protocol and Collaborative Practice Agreement with the patient's referring provider. A copy of this encounter was shared with the provider.

## 2019-07-18 NOTE — TELEPHONE ENCOUNTER
Phone call to Jake to discuss.  Blood sugar is coming down now.  We will see how it goes overnight. Kristin Stockton RN,CDE

## 2019-07-22 ENCOUNTER — MYC MEDICAL ADVICE (OUTPATIENT)
Dept: ENDOCRINOLOGY | Facility: CLINIC | Age: 44
End: 2019-07-22

## 2019-07-23 ENCOUNTER — PATIENT OUTREACH (OUTPATIENT)
Dept: EDUCATION SERVICES | Facility: CLINIC | Age: 44
End: 2019-07-23
Payer: COMMERCIAL

## 2019-07-24 NOTE — PROGRESS NOTES
Jake has been having high blood sugar over the weekend.  This seems to be related to a bent cannula.  Blood sugars started coming down as soon as he changed the cannula on Sunday 7/21/19.      He is feeling more optimistic about the pump at this point.  He has increased his basal rate from 0.7 to 1.0.  His ICR has moved from 13 to 8 and correction factor from 49 to 40.  He will upload again tomorrow. Kristin Stockton RN,CDE

## 2019-07-26 ENCOUNTER — TELEPHONE (OUTPATIENT)
Dept: ENDOCRINOLOGY | Facility: CLINIC | Age: 44
End: 2019-07-26

## 2019-07-26 ENCOUNTER — TELEPHONE (OUTPATIENT)
Dept: EDUCATION SERVICES | Facility: CLINIC | Age: 44
End: 2019-07-26

## 2019-07-26 NOTE — TELEPHONE ENCOUNTER
[facility] Call Center    Phone Message    May a detailed message be left on voicemail: no    Reason for Call: Medication Question or concern regarding medication   Prescription Clarification  Name of Medication: insulin aspart (NOVOLOG VIAL) 100 UNITS/ML vial  Prescribing Provider: Dr. Olguin   [facility]   What on the order needs clarification? Script says 70 units per day, but recent script says 50 units, needing clarification          Action Taken: Message routed to:  [facility] endo

## 2019-07-26 NOTE — TELEPHONE ENCOUNTER
[facility] Call Center    Phone Message    May a detailed message be left on voicemail: yes    Reason for Call: Medication Question or concern regarding medication   Prescription Clarification  Name of Medication: insulin aspart (NOVOLOG FLEXPEN) 100 UNIT/ML pen  Prescribing Provider: shawn hernandez   [facility]   What on the order needs clarification? Per pharmacy- stated they need an rx for vials and not pens, they would like a call back at [phone] thanks          Action Taken: Message routed to:  [facility] endocrine

## 2019-07-30 ENCOUNTER — ALLIED HEALTH/NURSE VISIT (OUTPATIENT)
Dept: EDUCATION SERVICES | Facility: CLINIC | Age: 44
End: 2019-07-30
Payer: COMMERCIAL

## 2019-07-30 DIAGNOSIS — E10.9 TYPE 1 DIABETES MELLITUS WITHOUT COMPLICATION (H): Primary | ICD-10-CM

## 2019-07-30 NOTE — PATIENT INSTRUCTIONS
Here are some tips to get the DexCom to stick to your skin:    One thing that is crucial is getting that good seal on day one.  Scrub the skin with alcohol to get rid of skin cells, lotions, natural oils. Let alcohol dry.  Apply sensor tape  and use pressure from fingertip/nail as well as friction to help the glue stick. It is pressure activated glue.  Apply pressure to the patch several times during the first day or so to re-activate the adhesive.  If placing Mastisol or skin tac, apply and let it dry- should be tacky.  Inspect daily espescially after showering and reinforce as needed.  Do not let needle touch adhesive so leave a clean section in the center of the adhesive. See below.  You can also dab a little of the mastisol or skin tac if the edges of the tape are peeling.  Good old medical tape, waterproof around the edge of the sensor tape- do not cover the transmitter. See below.  If using tagaderm, cut a hole in the tegaderm.

## 2019-07-30 NOTE — PROGRESS NOTES
"Diabetes Self-Management Education & Support    Diabetes Education Self Management & Training    SUBJECTIVE/OBJECTIVE:  Presents for: Follow-up  Accompanied by: Self  Diabetes education in the past 24mo: Yes  Focus of Visit: Insulin Pump  Diabetes type: Type 1  Date of diagnosis: 2018  Disease course: Improving  Diabetes management related comments/concerns: feeling good about where his blood sugar is at  Transportation concerns: No  Other concerns:: Glasses  Cultural Influences/Ethnic Background:  American      Diabetes Symptoms & Complications          Patient Problem List and Family Medical History reviewed for relevant medical history, current medical status, and diabetes risk factors.    Vitals:    Estimated body mass index is 31.06 kg/m  as calculated from the following:    Height as of 4/9/19: 1.778 m (5' 10\").    Weight as of 7/9/19: 98.2 kg (216 lb 8 oz).   Last 3 BP:   BP Readings from Last 3 Encounters:   07/09/19 125/81   04/09/19 128/83   04/03/19 (!) 123/96       History   Smoking Status     Former Smoker   Smokeless Tobacco     Never Used       Labs:  Lab Results   Component Value Date    A1C 8.1 05/14/2018     Lab Results   Component Value Date    GLC 80 04/24/2018     Lab Results   Component Value Date    LDL 73 10/05/2018     HDL Cholesterol   Date Value Ref Range Status   10/05/2018 39 (L) >39 mg/dL Final   ]  GFR Estimate   Date Value Ref Range Status   10/05/2018 >90 >60 mL/min/1.7m2 Final     Comment:     Non  GFR Calc     GFR Estimate If Black   Date Value Ref Range Status   10/05/2018 >90 >60 mL/min/1.7m2 Final     Comment:      GFR Calc     Lab Results   Component Value Date    CR 0.56 10/05/2018     No results found for: MICROALBUMIN    Healthy Eating  Feels confident with carb counting, eats very low carb during work day so he may no bolus at all     Being Active  Rides his bike a lot, walks a lot    Monitoring  Blood Glucose Meter: CGM  Home Glucose (Sugar) " Monitorin-2 times per day  Blood glucose trend: Fluctuating minimally  Low Glucose Range (mg/dL): <70  High Glucose Range (mg/dL): >200  Overall Range (mg/dL): 140-180        Taking Medications  Diabetes Medication(s)     Diabetic Other       glucagon (GLUCAGON EMERGENCY) 1 MG kit    Inject 1 mg into the muscle once for 1 dose    Insulin       insulin aspart (NOVOLOG VIAL) 100 UNITS/ML vial    Use 70 units per day via insulin pump (Max dose 70 units)     insulin glargine (LANTUS SOLOSTAR PEN) 100 UNIT/ML pen    Inject up to 30 units daily subcutaneously     insulin glargine (LANTUS SOLOSTAR PEN) 100 UNIT/ML pen    Inject 30 units subcutaneous at bedtime. ON WORK DAYS inject only 28 units at bedtime.          Current Treatments: Insulin Pump  Given by: Patient  Injection/Infusion sites: Abdomen  Problems taking diabetes medications regularly?: No  Diabetes medication side effects?: Yes  Treatment Compliance: All of the time    Problem Solving  Problem Solving Assessed Today: Yes  Hypoglycemia Frequency: Weekly  Hypoglycemia Treatment: Glucose (tablets or gel), Candy  Patient carries a carbohydrate source: Yes  Medical alert: Yes  Severe weather/disaster plan for diabetes management?: No  DKA prevention plan?: Yes    Hypoglycemia symptoms  Confusion: No  Dizziness or Light-Headedness: No  Headaches: No  Hunger: No  Mood changes: No  Nervousness/Anxiety: Yes  Sleepiness: No  Speech difficulty: No  Sweats: Yes  Tremors: Yes    Hypoglycemia Complications  Blackouts: No  Hospitalization: No  Nocturnal hypoglycemia: Yes  Required assistance: No  Required glucagon injection: No  Seizures: No    Reducing Risks  Has dilated eye exam at least once a year?: Yes(scheduled 2019)  Sees dentist every 6 months?: Yes  Sees podiatrist (foot doctor)?: No    Healthy Coping  Emotional response to diabetes: Ready to learn, Concern for health and well-being  Informal Support system:: Family, Friends  Patient Activation Measure Survey  Score:  SALINA Score (Last Two) 1/8/2013   SALINA Raw Score 43   Activation Score 68.5   SALINA Level 4       ASSESSMENT:  type 1 diabetes, new to insulin pump    Patient's most recent   Lab Results   Component Value Date    A1C 8.1 05/14/2018    is not meeting goal of <7.0    INTERVENTION:   We looked at Jake's T:Connect reports as well as DexCom Clarity reports.  His most recent days on the pump look great.  He is using a temp rate a lot.  He used a temp rate during his workday of 75% and that seemed to work well.  He used an extended basal for pizza last week and his blood sugar never went over 150.  He is pleased with the pump.    Education provided today on:  We talked about the special features on the pump that will be helpful to him going forward.  He is already using some of them.  We talked about site rotation and how to improve adhesion.  Jake will reach out via Album[employer] if he has questions or problems.  Opportunities for ongoing education and support in diabetes-self management were discussed.    Pt verbalized understanding of concepts discussed and recommendations provided today.       PLAN:  See Patient Instructions for co-developed, patient-stated behavior change goals.  AVS printed and provided to patient today. See Follow-Up section for recommended follow-up.    Time Spent: 30 minutes  Encounter Type: Individual    Any diabetes medication dose changes were made via the CDE Protocol and Collaborative Practice Agreement with the patient's referring provider. A copy of this encounter was shared with the provider.

## 2019-08-04 ENCOUNTER — TELEPHONE (OUTPATIENT)
Dept: EDUCATION SERVICES | Facility: CLINIC | Age: 44
End: 2019-08-04

## 2019-08-04 DIAGNOSIS — E10.9 TYPE 1 DIABETES MELLITUS WITHOUT COMPLICATION (H): Primary | ICD-10-CM

## 2019-08-04 RX ORDER — SYRINGE-NEEDLE,INSULIN,0.5 ML 27GX1/2"
SYRINGE, EMPTY DISPOSABLE MISCELLANEOUS
Qty: 100 EACH | Refills: 1 | Status: SHIPPED | OUTPATIENT
Start: 2019-08-04

## 2019-08-04 NOTE — TELEPHONE ENCOUNTER
Jake is in [place], [place] and he used his last infusion set.  He needs a prescription for insulin syringes sent in. Kristin Stockton RN,CDE

## 2019-08-09 ENCOUNTER — OFFICE VISIT (OUTPATIENT)
Dept: OPHTHALMOLOGY | Facility: CLINIC | Age: 44
End: 2019-08-09
Payer: COMMERCIAL

## 2019-08-09 DIAGNOSIS — H52.4 PRESBYOPIA: ICD-10-CM

## 2019-08-09 DIAGNOSIS — E10.9 TYPE 1 DIABETES MELLITUS WITHOUT RETINOPATHY (H): Primary | ICD-10-CM

## 2019-08-09 ASSESSMENT — CUP TO DISC RATIO
OD_RATIO: 0.5
OS_RATIO: 0.5

## 2019-08-09 ASSESSMENT — EXTERNAL EXAM - RIGHT EYE: OD_EXAM: NORMAL

## 2019-08-09 ASSESSMENT — REFRACTION_WEARINGRX: SPECS_TYPE: SVL

## 2019-08-09 ASSESSMENT — REFRACTION_MANIFEST
OD_CYLINDER: +1.75
OS_SPHERE: -3.25
OD_ADD: +1.00
OS_CYLINDER: +1.25
OS_ADD: +1.00
OD_AXIS: 014
OS_AXIS: 160
OD_SPHERE: -3.75

## 2019-08-09 ASSESSMENT — SLIT LAMP EXAM - LIDS
COMMENTS: NORMAL
COMMENTS: NORMAL

## 2019-08-09 ASSESSMENT — VISUAL ACUITY
OS_CC: J1+
OD_CC: J1+
OD_CC: 20/20
CORRECTION_TYPE: GLASSES
OS_CC+: -1
OD_CC+: -2
METHOD: SNELLEN - LINEAR
OS_CC: 20/20

## 2019-08-09 ASSESSMENT — TONOMETRY
OS_IOP_MMHG: 16
OD_IOP_MMHG: 14
IOP_METHOD: ICARE

## 2019-08-09 ASSESSMENT — CONF VISUAL FIELD
OS_NORMAL: 1
OD_NORMAL: 1
METHOD: COUNTING FINGERS

## 2019-08-09 ASSESSMENT — EXTERNAL EXAM - LEFT EYE: OS_EXAM: NORMAL

## 2019-08-09 NOTE — NURSING NOTE
Chief Complaints and History of Present Illnesses   Patient presents with     Annual Eye Exam     Diabetic Eye Exam     Chief Complaint(s) and History of Present Illness(es)     Annual Eye Exam     Laterality: both eyes    Onset: 1 year ago    Quality: blurred vision    Severity: mild    Frequency: intermittently    Context: computer work    Course: stable    Associated symptoms: Negative for eye pain, dryness, tearing, floaters and flashes    Treatments tried: no treatments    Pain scale: 0/10              Diabetic Eye Exam     Associated symptoms: blurred vision.  Negative for tearing    Diabetes Type: Type 1 and on insulin    Duration: 1.5 years    Blood Sugars: fluctuates              Comments     Pt here for annual DM 1 CEE.  Pt gets blurred vision when in front computer when BS are 200 or higher.  Last for about 2 min.  Started 16 months ago.  Pt states no pain or other concerns.    DM 1 BS was 131 at 11:35 am today.  Lab Results       Component                Value               Date                       A1C                      8.1                 05/14/2018                 A1C                      >15.0               03/14/2018                 A1C                      >15.0               03/05/2018                Digna Rodriguez August 9, 2019 11:45 AM

## 2019-08-09 NOTE — PROGRESS NOTES
Assessment/Plan  (E10.9) Type 1 diabetes mellitus without retinopathy (H)  (primary encounter diagnosis)  Comment: Diagnosed 2018. Patient now on insulin pump  Plan: Discussed findings with patient as well as importance of continued blood glucose control. RTC with any vision changes. Plan on monitoring annually with dilated exam.    (H52.4) Presbyopia  Plan: REFRACTION [71402]        SRx updated and released. Patient ok to fill Rx as distance only or as progressive lens. RTC with prolonged adaptation difficulties.       Complete documentation of historical and exam elements from today's encounter can  be found in the full encounter summary report (not reduplicated in this progress  note). I personally obtained the chief complaint(s) and history of present illness. I  confirmed and edited as necessary the review of systems, past medical/surgical  history, family history, social history, and examination findings as documented by  others; and I examined the patient myself. I personally reviewed the relevant tests,  images, and reports as documented above. I formulated and edited as necessary the  assessment and plan and discussed the findings and management plan with the  patient and family.    Lenny Donnelly, OD

## 2019-08-16 ENCOUNTER — ANCILLARY PROCEDURE (OUTPATIENT)
Dept: GENERAL RADIOLOGY | Facility: CLINIC | Age: 44
End: 2019-08-16
Attending: FAMILY MEDICINE
Payer: COMMERCIAL

## 2019-08-16 ENCOUNTER — OFFICE VISIT (OUTPATIENT)
Dept: ORTHOPEDICS | Facility: CLINIC | Age: 44
End: 2019-08-16
Payer: COMMERCIAL

## 2019-08-16 VITALS
BODY MASS INDEX: 30.64 KG/M2 | SYSTOLIC BLOOD PRESSURE: 148 MMHG | DIASTOLIC BLOOD PRESSURE: 88 MMHG | WEIGHT: 214 LBS | HEIGHT: 70 IN

## 2019-08-16 DIAGNOSIS — M79.641 PAIN OF RIGHT HAND: ICD-10-CM

## 2019-08-16 DIAGNOSIS — M65.831 EXTENSOR INTERSECTION SYNDROME OF RIGHT WRIST: ICD-10-CM

## 2019-08-16 DIAGNOSIS — M79.641 PAIN OF RIGHT HAND: Primary | ICD-10-CM

## 2019-08-16 DIAGNOSIS — M65.4 RADIAL STYLOID TENOSYNOVITIS (DE QUERVAIN): ICD-10-CM

## 2019-08-16 PROCEDURE — 73130 X-RAY EXAM OF HAND: CPT | Mod: RT

## 2019-08-16 PROCEDURE — 99203 OFFICE O/P NEW LOW 30 MIN: CPT | Performed by: FAMILY MEDICINE

## 2019-08-16 ASSESSMENT — MIFFLIN-ST. JEOR: SCORE: 1871.95

## 2019-08-16 NOTE — LETTER
August 16, 2019      Jake was seen in my office today.  He will need to use a thumb spica brace full time at work for up to the next 3 weeks.  He needs to also modify or eliminate painful activity during that time.  Lift/carry more than 5 pounds with the right arm, patient transfers, and IV starts should be avoided.  Updated recommendations will be provided as needed based on his progress.      Giuseppe Marie DO, CAQ  Primary Care Sports Medicine  [facility]

## 2019-08-16 NOTE — PROGRESS NOTES
"Jake Brothers  :  1975  DOS: 2019  MRN: [id number]    Sports Medicine Clinic Visit    PCP: Caden Mitchell    Jake Brothers is a 44 year old Right hand dominant male who is seen as an AIC patient presenting with right hand pain    Injury: 8 week(s) after building a wood deck.  Pain located over right dorsal hand, radiating to his fingers and forearm.  Additional Features:  Positive: swelling, popping and weakness.  Symptoms are better with Ibuprofen and Rest.  Symptoms are worse with: use of right hand and gripping.  Other evaluation and/or treatments so far consists of: Ice and compression.  Recent imaging completed: No recent imaging completed.  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    Past Medical History:   Diagnosis Date     Bronchitis, chronic, simple (H)      Obesity      Past Surgical History:   Procedure Laterality Date     C ORAL SURGERY PROCEDURE      [place] Teeth Extraction     Family History   Problem Relation Age of Onset     Hypertension Father      Alcohol/Drug Father      Depression Father      Heart Disease Father      Depression Mother      Macular Degeneration Maternal Grandfather      Diabetes Paternal Uncle      Diabetes Paternal Uncle      Allergies Sister         pcn     Cancer No family hx of         No family history of skin cancer     Glaucoma No family hx of      Objective  BP (!) 148/88   Ht 1.778 m (5' 10\")   Wt 97.1 kg (214 lb)   BMI 30.71 kg/m        General: healthy, alert and in no distress      HEENT: no scleral icterus or conjunctival erythema     Skin: no suspicious lesions or rash. No jaundice.     CV: regular rhythm by palpation, 2+ distal pulses, no pedal edema      Resp: normal respiratory " effort without conversational dyspnea     Psych: normal mood and affect      Gait: nonantalgic, appropriate coordination and balance     Neuro: normal light touch sensory exam of the extremities. Motor strength as noted below     Right Wrist and Hand exam    Inspection:       Swelling: over dorsal, distal forearm, more radial, and overlying the dorsal/radial wrist    Tender:       1st and 2nd dorsal compartments most focally, more mildly in surrounding areas    Non Tender:       Remainder of the Wrist and Hand bilateral    ROM:       Decreased active and passive ROM of the right Wrist with flexion and extension right    Strength:       Motor function intact, strength symmetric, pain with resisted thumb abduction and wrist extension and radial deviation    Special Tests:        neg (-) Tinel's test bilateral,       neg (-) Phalen's test bilateral,       neg (-) TFCC compression test bilateral and       positive (+) Finkelstein's maneuver left    Neurovascular:       2+ radial pulses bilaterally with brisk capillary refill and      normal sensation to light touch in the radial, median and ulnar nerve distributions    Radiology:  Recent Results (from the past 744 hour(s))   XR Hand Right G/E 3 Views    Narrative    HAND RIGHT THREE OR MORE VIEWS  8/16/2019 10:37 AM     HISTORY: Pain of right hand.    COMPARISON: None.      Impression    IMPRESSION: There is a 6 mm bony density projecting dorsal to the  proximal carpal row on the lateral view. This appears well-corticated  and is likely the result of an old ununited triquetral fracture.  However, there is soft tissue swelling over this region and an acute  triquetral fracture is not completely excluded. Clinical correlation  for pain in this area is recommended. Remainder of the right hand and  visualized wrist are unremarkable.     Assessment:  1. Pain of right hand    2. Extensor intersection syndrome of right wrist    3. Radial styloid tenosynovitis (de quervain)         Plan:  Discussed the assessment with the patient.  Follow up: prn based on clinical progress  XR images independently visualized and reviewed with patient today in clinic  Doubtful that old calcification is an acute triquetral fracture based on sx progression and exam, but our treatment will cover for this  More clinically consistent with dequervains/intersection syndrome  Thumb spica brace provided today  Consider CSI in future for labile sx  Acute overuse injury was the biggest factor in current flare, reviewed possible ancillary role for his autoimmune, DM hx  Activity modification reviewed  Letter provided for work restrictions  We discussed modified progressive pain-free activity as tolerated  Home handouts provided and supportive care reviewed  All questions were answered today  Contact us with additional questions or concerns  Signs and sx of concern reviewed      Giuseppe Guzmán DO, IRENA  Primary Care Sports Medicine  [facility]             Disclaimer: This note consists of symbols derived from keyboarding, dictation and/or voice recognition software. As a result, there may be errors in the script that have gone undetected. Please consider this when interpreting information found in this chart.

## 2019-08-16 NOTE — LETTER
"    2019         RE: Jake Brothers  [address]  [facility] [address]        Dear Colleague,    Thank you for referring your patient, Jake Brothers, to the [facility]. Please see a copy of my visit note below.    Jake Brothers  :  1975  DOS: 2019  MRN: [id number]    Sports Medicine Clinic Visit    PCP: Caden Mitchell    Jake Brothers is a 44 year old Right hand dominant male who is seen as an AIC patient presenting with right hand pain    Injury: 8 week(s) after building a wood deck.  Pain located over right dorsal hand, radiating to his fingers and forearm.  Additional Features:  Positive: swelling, popping and weakness.  Symptoms are better with Ibuprofen and Rest.  Symptoms are worse with: use of right hand and gripping.  Other evaluation and/or treatments so far consists of: Ice and compression.  Recent imaging completed: No recent imaging completed.  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    Past Medical History:   Diagnosis Date     Bronchitis, chronic, simple (H)      Obesity      Past Surgical History:   Procedure Laterality Date     C ORAL SURGERY PROCEDURE      [place] Teeth Extraction     Family History   Problem Relation Age of Onset     Hypertension Father      Alcohol/Drug Father      Depression Father      Heart Disease Father      Depression Mother      Macular Degeneration Maternal Grandfather      Diabetes Paternal Uncle      Diabetes Paternal Uncle      Allergies Sister         pcn     Cancer No family hx of         No family history of skin cancer     Glaucoma No family hx of      Objective  BP (!) 148/88   Ht 1.778 m (5' 10\")   Wt 97.1 kg (214 lb)   BMI 30.71 kg/m   "       General: healthy, alert and in no distress      HEENT: no scleral icterus or conjunctival erythema     Skin: no suspicious lesions or rash. No jaundice.     CV: regular rhythm by palpation, 2+ distal pulses, no pedal edema      Resp: normal respiratory effort without conversational dyspnea     Psych: normal mood and affect      Gait: nonantalgic, appropriate coordination and balance     Neuro: normal light touch sensory exam of the extremities. Motor strength as noted below     Right Wrist and Hand exam    Inspection:       Swelling: over dorsal, distal forearm, more radial, and overlying the dorsal/radial wrist    Tender:       1st and 2nd dorsal compartments most focally, more mildly in surrounding areas    Non Tender:       Remainder of the Wrist and Hand bilateral    ROM:       Decreased active and passive ROM of the right Wrist with flexion and extension right    Strength:       Motor function intact, strength symmetric, pain with resisted thumb abduction and wrist extension and radial deviation    Special Tests:        neg (-) Tinel's test bilateral,       neg (-) Phalen's test bilateral,       neg (-) TFCC compression test bilateral and       positive (+) Finkelstein's maneuver left    Neurovascular:       2+ radial pulses bilaterally with brisk capillary refill and      normal sensation to light touch in the radial, median and ulnar nerve distributions    Radiology:  Recent Results (from the past 744 hour(s))   XR Hand Right G/E 3 Views    Narrative    HAND RIGHT THREE OR MORE VIEWS  8/16/2019 10:37 AM     HISTORY: Pain of right hand.    COMPARISON: None.      Impression    IMPRESSION: There is a 6 mm bony density projecting dorsal to the  proximal carpal row on the lateral view. This appears well-corticated  and is likely the result of an old ununited triquetral fracture.  However, there is soft tissue swelling over this region and an acute  triquetral fracture is not completely excluded. Clinical  correlation  for pain in this area is recommended. Remainder of the right hand and  visualized wrist are unremarkable.     Assessment:  1. Pain of right hand    2. Extensor intersection syndrome of right wrist    3. Radial styloid tenosynovitis (de quervain)        Plan:  Discussed the assessment with the patient.  Follow up: prn based on clinical progress  XR images independently visualized and reviewed with patient today in clinic  Doubtful that old calcification is an acute triquetral fracture based on sx progression and exam, but our treatment will cover for this  More clinically consistent with dequervains/intersection syndrome  Thumb spica brace provided today  Consider CSI in future for labile sx  Acute overuse injury was the biggest factor in current flare, reviewed possible ancillary role for his autoimmune, DM hx  Activity modification reviewed  Letter provided for work restrictions  We discussed modified progressive pain-free activity as tolerated  Home handouts provided and supportive care reviewed  All questions were answered today  Contact us with additional questions or concerns  Signs and sx of concern reviewed      Giuseppe Guzmán DO, CAQ  Primary Care Sports Medicine  [facility]             Disclaimer: This note consists of symbols derived from keyboarding, dictation and/or voice recognition software. As a result, there may be errors in the script that have gone undetected. Please consider this when interpreting information found in this chart.        Again, thank you for allowing me to participate in the care of your patient.        Sincerely,        Giuseppe Guzmán DO

## 2019-09-09 NOTE — TELEPHONE ENCOUNTER
"Requested Prescriptions   Pending Prescriptions Disp Refills     atorvastatin (LIPITOR) 20 MG tablet [Pharmacy Med Name: ATORVASTATIN CALCIUM 20MG TABS] 90 tablet 3     Sig: TAKE ONE TABLET BY MOUTH EVERY DAY  Last Written Prescription Date:  3/14/2018  Last Fill Quantity: 90 tablet,  # refills: 3   Last Office Visit: 5/14/2018   Future Office Visit:            Statins Protocol Failed - 9/9/2019  1:28 PM        Failed - Recent (12 mo) or future (30 days) visit within the authorizing provider's specialty     Patient had office visit in the last 12 months or has a visit in the next 30 days with authorizing provider or within the authorizing provider's specialty.  See \"Patient Info\" tab in inbasket, or \"Choose Columns\" in Meds & Orders section of the refill encounter.            Passed - LDL on file in past 12 months     Recent Labs   Lab Test 10/05/18  0915   LDL 73           Passed - No abnormal creatine kinase in past 12 months     No lab results found.           Passed - Medication is active on med list        Passed - Patient is age 18 or older          "

## 2019-09-11 RX ORDER — ATORVASTATIN CALCIUM 20 MG/1
TABLET, FILM COATED ORAL
Qty: 30 TABLET | Refills: 0 | Status: SHIPPED | OUTPATIENT
Start: 2019-09-11 | End: 2020-01-02

## 2019-09-24 ENCOUNTER — OFFICE VISIT (OUTPATIENT)
Dept: FAMILY MEDICINE | Facility: CLINIC | Age: 44
End: 2019-09-24
Payer: COMMERCIAL

## 2019-09-24 VITALS
DIASTOLIC BLOOD PRESSURE: 86 MMHG | RESPIRATION RATE: 14 BRPM | WEIGHT: 216.5 LBS | OXYGEN SATURATION: 99 % | SYSTOLIC BLOOD PRESSURE: 124 MMHG | HEART RATE: 74 BPM | BODY MASS INDEX: 31.06 KG/M2 | TEMPERATURE: 97.9 F

## 2019-09-24 DIAGNOSIS — M79.641 PAIN OF RIGHT HAND: Primary | ICD-10-CM

## 2019-09-24 DIAGNOSIS — Z23 NEED FOR PROPHYLACTIC VACCINATION AND INOCULATION AGAINST INFLUENZA: ICD-10-CM

## 2019-09-24 PROCEDURE — 90471 IMMUNIZATION ADMIN: CPT | Performed by: FAMILY MEDICINE

## 2019-09-24 PROCEDURE — 90686 IIV4 VACC NO PRSV 0.5 ML IM: CPT | Performed by: FAMILY MEDICINE

## 2019-09-24 PROCEDURE — 99213 OFFICE O/P EST LOW 20 MIN: CPT | Mod: 25 | Performed by: FAMILY MEDICINE

## 2019-09-24 NOTE — PROGRESS NOTES
Subjective     Jake Brothers is a 44 year old male who presents to clinic today for the following health issues:    HPI   Return to work paper work     Been to sports medicine - right hand pain.   It was not work related.     4 day old boy at home. Busy. Sports med in [place].     Would like to get restrictions removed by me..        Social History     Occupational History     Occupation: Adult Psychiatry     Employer: [employer]     Comment: CNA   Tobacco Use     Smoking status: Former Smoker     Smokeless tobacco: Never Used   Substance and Sexual Activity     Alcohol use: No     Comment: quit 03/2017     Drug use: No     Sexual activity: Yes     Partners: Female     Birth control/protection: Condom     No Known Allergies  Patient Active Problem List   Diagnosis     Obesity     CARDIOVASCULAR SCREENING; LDL GOAL LESS THAN 160     Eczema     Elevated LFTs     Cervicalgia     Hepatic steatosis     Celiac disease     Vitiligo     Iron deficiency     Type 1 diabetes mellitus without complication (H)     Reviewed medications, social history and  past medical and surgical history.    Review of system: for general, respiratory, CVS, GI and psychiatry negative except for noted above.     EXAM:  /86 (BP Location: Right arm, Patient Position: Sitting, Cuff Size: Adult Regular)   Pulse 74   Temp 97.9  F (36.6  C) (Oral)   Resp 14   Wt 98.2 kg (216 lb 8 oz)   SpO2 99%   BMI 31.06 kg/m    Constitutional: healthy, alert and no distress   Psychiatric: mentation appears normal and affect normal/bright  JOINT/EXTREMITIES: Both wrists, hands examined.  Distal pulses intact.  , strength, reflexes are normal.  No any focal tender spot.    ASSESSMENT / PLAN:  (M75.113) Pain of right hand  (primary encounter diagnosis)  Comment:    Plan: Now resolved.  Reviewed sports medicine notes.  Work restriction removed as per his request.  Filled out the form.    (Z23) Need for prophylactic vaccination and  inoculation against influenza  Comment:    Plan: INFLUENZA VACCINE IM > 6 MONTHS VALENT IIV4         [17779], Vaccine Administration, Initial         [[id number]]                  The above note was dictated using voice recognition. Although reviewed after completion, some word and grammatical error may remain .

## 2019-09-25 ENCOUNTER — OFFICE VISIT (OUTPATIENT)
Dept: ENDOCRINOLOGY | Facility: CLINIC | Age: 44
End: 2019-09-25
Payer: COMMERCIAL

## 2019-09-25 VITALS
HEIGHT: 70 IN | BODY MASS INDEX: 31.42 KG/M2 | HEART RATE: 74 BPM | SYSTOLIC BLOOD PRESSURE: 143 MMHG | WEIGHT: 219.5 LBS | DIASTOLIC BLOOD PRESSURE: 89 MMHG

## 2019-09-25 DIAGNOSIS — K90.0 CELIAC DISEASE: ICD-10-CM

## 2019-09-25 DIAGNOSIS — R53.83 FATIGUE, UNSPECIFIED TYPE: ICD-10-CM

## 2019-09-25 DIAGNOSIS — E10.9 TYPE 1 DIABETES MELLITUS WITHOUT COMPLICATION (H): Primary | ICD-10-CM

## 2019-09-25 DIAGNOSIS — L80 VITILIGO: ICD-10-CM

## 2019-09-25 ASSESSMENT — MIFFLIN-ST. JEOR: SCORE: 1896.9

## 2019-09-25 ASSESSMENT — PAIN SCALES - GENERAL: PAINLEVEL: MODERATE PAIN (4)

## 2019-09-25 NOTE — LETTER
9/25/2019     RE: Jake Brothers  [address]  [facility] [address]     Dear Colleague,    Thank you for referring your patient, Jake Brothers, to the [facility] ENDOCRINOLOGY at [facility]. Please see a copy of my visit note below.    Reason for visit/consult: T1DM, SHANTEL+    Primary care provider: Caden Mitchell    HPI:  43 yo with T1DM without complications here for f/u visit. He was last seen here by me on 3/28/18 and since then he has seen Simran Dacosta last on 7/9/19. A1c was 7.6% at that time compared to diagnosis when it was >15%.  Tandem pump and Dexcom G6 and Freestyle Sukhi.  He is having some overnight lows and has Qs about sensitivity. Boluses are almost double basal.    PUMP SETTINGS:  Basal rates: 1u/hr   Carbohydrate to insulin ratios: 1:7 g CHO.   Sensitivity: 1 unit will drop him 28 mg/dl.   TDD: 60.6 units  Basal: 21.7 units  Bolus: 38.9    Meter (LibreView) download data is as follows:  Average: 147  SD 51   Above 180: 9   In Target Range (): 24   Below 70: 2     There were 2 lows within past couple of weeks overnight: one this am of 58 and another on 9/14/19 of 43.    Diabetes Care  Retinopathy: none; pt seen by Oph annually  Nephropathy: none; urine microalbuminuria was + at time when his A1C was > 15 %. Recheck urine microalbuminuria was negative in Oct 2018.  Neuropathy: none.  Foot Exam: no ulcers. Foot exam was done today, 9/25/19.  Taking aspirin: no.  Lipids: LDL 73 in Oct 2018. Pt is taking Lipitor daily.    Past Medical/Surgical History:  Past Medical History:   Diagnosis Date     Bronchitis, chronic, simple (H)      Obesity      Past Surgical History:   Procedure Laterality Date     C ORAL SURGERY PROCEDURE  1994    [place] Teeth Extraction     Allergies:  No Known Allergies    Current Medications   Current Outpatient Medications   Medication     albuterol (PROAIR HFA, PROVENTIL HFA, VENTOLIN HFA) 108 (90 BASE) MCG/ACT inhaler      "atorvastatin (LIPITOR) 20 MG tablet     Continuous Blood Gluc  (FREESTYLE CARI 14 DAY READER) MIKAELA     Continuous Blood Gluc Sensor (FREESTYLE CARI 14 DAY SENSOR) MISC     continuous blood glucose monitoring (FREESTYLE CARI) sensor     insulin aspart (NOVOLOG VIAL) 100 UNITS/ML vial     insulin glargine (LANTUS SOLOSTAR PEN) 100 UNIT/ML pen     insulin glargine (LANTUS SOLOSTAR PEN) 100 UNIT/ML pen     insulin pen needle (BD ROVERTO U/F) 32G X 4 MM miscellaneous     insulin syringe-needle U-100 (29G X 1/2\" 0.5 ML) 29G X 1/2\" 0.5 ML miscellaneous     Multiple Vitamins-Minerals (MULTIVITAMIN ADULT PO)     order for DME     Ostomy Supplies (SKIN TAC ADHESIVE BARRIER WIPE) MISC     glucagon (GLUCAGON EMERGENCY) 1 MG kit     No current facility-administered medications for this visit.      Family History:  Family History   Problem Relation Age of Onset     Hypertension Father      Alcohol/Drug Father      Depression Father      Heart Disease Father      Depression Mother      Macular Degeneration Maternal Grandfather      Diabetes Paternal Uncle      Diabetes Paternal Uncle      Allergies Sister         pcn     Cancer No family hx of         No family history of skin cancer     Glaucoma No family hx of      Social History:  Social History     Tobacco Use     Smoking status: Former Smoker     Smokeless tobacco: Never Used   Substance Use Topics     Alcohol use: No     Comment: quit 03/2017     Additional Social History: 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    ROS:  negative except as mentioned in HPI    Exam  Blood pressure (!) 143/89, pulse 74, height 1.778 m (5' 10\"), weight 99.6 kg (219 lb 8 oz).  Gen: well appearing, nad, pleasant and conversant  HEENT: anicteric, EOMI, no proptosis or lid lag, no goiter  Ext: no swelling or edema  Feet: no deformities or ulcers, 2+ DP pulses, normal monofilament sensation. Some dryness of heels, no cracking  Neuro: A&Ox3, no " tremor    Labs/Imaging    ENDO DIABETES Latest Ref Rng & Units 10/5/2018   HEMOGLOBIN A1C 0 - 5.6 %    HEMOGLOBIN A1C, POC 4.3 - 6.0 %    HGB 13.3 - 17.7 g/dL    GLUCOSE 70 - 99 mg/dL    CHOLESTEROL <200 mg/dL 124   LDL CHOLESTEROL, CALCULATED <100 mg/dL 73   HDL CHOLESTEROL >39 mg/dL 39 (L)   VLDL-CHOLESTEROL 0 - 30 mg/dL    NON HDL CHOLESTEROL <130 mg/dL 85   TRIGLYCERIDES <150 mg/dL 59   ALBUMIN URINE MG/L mg/L 16   ALBUMIN URINE MG/G CR 0 - 17 mg/g Cr 7.76   CREATININE 0.66 - 1.25 mg/dL 0.56 (L)   POTASSIUM 3.4 - 5.3 mmol/L 4.1   ALT 0 - 70 U/L 41   AST 0 - 45 U/L 20   WBC 4.0 - 11.0 10e9/L    RBC 4.4 - 5.9 10e12/L    HGB 13.3 - 17.7 g/dL    HCT 40.0 - 53.0 %    MCV 78 - 100 fl    MCH 26.5 - 33.0 pg    MCHC 31.5 - 36.5 g/dL    RDW 10.0 - 15.0 %     - 450 10e9/L      Assessment and Plan    T1DM, reasonable control. More bolus than basal with some overnight hypoglycemia. He has Qs about sensitivity. calcuations using rules of 1500 and 500 were reviewed with patient. He calculates to 1:7 g CHO and 1 per 25 for sensitivity based on TDD of 60 units. He is in agreement with using the 1 per 25>140 correction.    Autoimmune predilection: TSH, FT4 today and annually, (and creatinine and hemoglobin for diabetes).    Patient Instructions:  Please go to the lab on 1st floor for thyroid labs    Please increase your daytime basal rate 7a-mn from 1 to 1.2 unit(s)/hr  & Reduce at mn from 1 to 0.9 units/hr    Please continue your 1:7 g CHO meal time insulin boluses    HIGH Insulin Resistance or Correction Factor of 1 for 25 mg/dL  Do Not give Correction Insulin if Pre-Meal BG less than 140.   For Pre-Meal  - 164 give 1 unit.   For Pre-Meal  - 189 give 2 units.   For Pre-Meal  - 214 give 3 units.   For Pre-Meal  - 239 give 4 units.   For Pre-Meal  - 264 give 5 units.   For Pre-Meal  - 289 give 6 units.   For Pre-Meal  - 314 give 7 units.   For Pre-Meal  - 339 give 8 units.    For Pre-Meal  - 364 give 9 units.  For Pre-Meal BG greater than or equal to 365 give 10 units  To be given with prandial insulin, and based on pre-meal blood glucose.   Notify provider if glucose greater than or equal to 350 mg/dL after administration of correction dose.     HIGH Insulin Resistance or Correction Factor of 1 for 25 mg/dL  Do Not give Bedtime Correction Insulin if BG less than 200.   For  - 224 give 1 units.   For  - 249 give 2 units.   For  - 274 give 3 units.   For  - 299 give 4 units.   For  - 324 give 5 units.   For  - 349 give 6 units.   For BG greater than or equal to 350 give 7 units.   Notify provider if glucose greater than or equal to 350 mg/dL after administration of correction dose.    1,500 calorie meal plan to lose 1 lbs weekly without exercise (based on REE calc of 2,031)  Use meal replacements such as Chanel's meals, Lean Cuisines, Healthy Choice, Smart Ones, Weight Watchers Meals, and Slim Fast and Glucerna shakes and supplement with fresh fruits and vegetables  Please drink a lot of water daily. Most people typically need about 2 liters of water daily and more if they are exercising, have a large weight, or have a fever or illness. Add Crystal Light for flavoring if desired. But no pop with calories in it.  Please keep a food journal of what you eat, calories in what you eat  Consider using applications for smart phones such as [employer], [employer], [employer], LoseIt, Tap&Track, and RelaxM.  Focus on wet volumetrics, meaning, eat more foods that are high in water and fiber such as fruits and vegetables in order to get that full feeling. These are also good for your overall health as well.  Check out Dr. Jolanta Edward from [facility] - she has cookbooks with low calorie volumetric recipes  You can try Let's Dish to help you prepare meals for you and your family. Often times, the caloric and nutrition data and serving sizes are available  for this food. This can be a time saving maneuver. The website can give you more information http://www.ZipList."[employer]/  Cob[employer] Del[employer] has Let's Dish & fresh low calorie salads  Check out Hello Fresh at https://www.curated.by."[employer]/food-boxes/classic-box/  Try Cooking Light recipes for low calorie meal preparation and planning  Other food plan options you can search for on the internet and check out include: Mando DELGADO, [facility]    Consider hiring a  to develop a structured progressive workout plan that includes both cardio and resistance training. Obesity is a disease according to the IRS, so you may be able to use some pre-tax dollars from your medical flexible spending account if you get a letter from your doctor and/or fill out a plan-specific form.    RTC: Simran quach 3-6 months, annually with me    Alejandra Olguin MD, MPH  Attending Physician  Diabetes/Endocrinology/Metabolism    Time: 40 min spent on chart review, evaluation, management, counseling, education, & motivational interviewing with greater than 50% of the total time was spent on counseling and coordinating care    Again, thank you for allowing me to participate in the care of your patient.      Sincerely,    Alejandra Olguin MD

## 2019-09-25 NOTE — PATIENT INSTRUCTIONS
Please go to the lab on 1st floor for thyroid labs    Please increase your daytime basal rate 7a-mn from 1 to 1.2 unit(s)/hr  & Reduce at mn from 1 to 0.9 units/hr    Please continue your 1:7 g CHO meal time insulin boluses    HIGH Insulin Resistance or Correction Factor of 1 for 25 mg/dL  Do Not give Correction Insulin if Pre-Meal BG less than 140.   For Pre-Meal  - 164 give 1 unit.   For Pre-Meal  - 189 give 2 units.   For Pre-Meal  - 214 give 3 units.   For Pre-Meal  - 239 give 4 units.   For Pre-Meal  - 264 give 5 units.   For Pre-Meal  - 289 give 6 units.   For Pre-Meal  - 314 give 7 units.   For Pre-Meal  - 339 give 8 units.   For Pre-Meal  - 364 give 9 units.  For Pre-Meal BG greater than or equal to 365 give 10 units  To be given with prandial insulin, and based on pre-meal blood glucose.   Notify provider if glucose greater than or equal to 350 mg/dL after administration of correction dose.     HIGH Insulin Resistance or Correction Factor of 1 for 25 mg/dL  Do Not give Bedtime Correction Insulin if BG less than 200.   For  - 224 give 1 units.   For  - 249 give 2 units.   For  - 274 give 3 units.   For  - 299 give 4 units.   For  - 324 give 5 units.   For  - 349 give 6 units.   For BG greater than or equal to 350 give 7 units.   Notify provider if glucose greater than or equal to 350 mg/dL after administration of correction dose.      1,500 calorie meal plan to lose 1 lbs weekly without exercise (based on REE calc of 2,031)  Use meal replacements such as Chanel's meals, Lean Cuisines, Healthy Choice, Smart Ones, Weight Watchers Meals, and Slim Fast and Glucerna shakes and supplement with fresh fruits and vegetables  Please drink a lot of water daily. Most people typically need about 2 liters of water daily and more if they are exercising, have a large weight, or have a fever or illness. Add Crystal Light for flavoring if  desired. But no pop with calories in it.  Please keep a food journal of what you eat, calories in what you eat  Consider using applications for smart phones such as [employer], [employer], [employer], LoseIt, Tap&Track, and RelaxM.  Focus on wet volumetrics, meaning, eat more foods that are high in water and fiber such as fruits and vegetables in order to get that full feeling. These are also good for your overall health as well.  Check out Dr. Jolanta Edward from [facility] - she has cookbooks with low calorie volumetric recipes  You can try Let's Dish to help you prepare meals for you and your family. Often times, the caloric and nutrition data and serving sizes are available for this food. This can be a time saving maneuver. The website can give you more information http://www.Department of Health and Human Services/  Cobblade's Delivers has Let's Dish & fresh low calorie salads  Check out Hello Fresh at https://www.[address]/food-boxes/classic-box/  Try Cooking Light recipes for low calorie meal preparation and planning  Other food plan options you can search for on the internet and check out include: Mando DELGADO, [facility]    Consider hiring a  to develop a structured progressive workout plan that includes both cardio and resistance training. Obesity is a disease according to the IRS, so you may be able to use some pre-tax dollars from your medical flexible spending account if you get a letter from your doctor and/or fill out a plan-specific form.

## 2019-09-25 NOTE — PROGRESS NOTES
Reason for visit/consult: T1DM, SHANTEL+    Primary care provider: Caden Mitchell    HPI:  43 yo with T1DM without complications here for f/u visit. He was last seen here by me on 3/28/18 and since then he has seen Simran Dacosta last on 7/9/19. A1c was 7.6% at that time compared to diagnosis when it was >15%.  Tandem pump and Dexcom G6 and Freestyle Sukhi.  He is having some overnight lows and has Qs about sensitivity. Boluses are almost double basal.    PUMP SETTINGS:  Basal rates: 1u/hr   Carbohydrate to insulin ratios: 1:7 g CHO.   Sensitivity: 1 unit will drop him 28 mg/dl.   TDD: 60.6 units  Basal: 21.7 units  Bolus: 38.9    Meter (LibreView) download data is as follows:  Average: 147  SD 51   Above 180: 9   In Target Range (): 24   Below 70: 2     There were 2 lows within past couple of weeks overnight: one this am of 58 and another on 9/14/19 of 43.    Diabetes Care  Retinopathy: none; pt seen by Oph annually  Nephropathy: none; urine microalbuminuria was + at time when his A1C was > 15 %. Recheck urine microalbuminuria was negative in Oct 2018.  Neuropathy: none.  Foot Exam: no ulcers. Foot exam was done today, 9/25/19.  Taking aspirin: no.  Lipids: LDL 73 in Oct 2018. Pt is taking Lipitor daily.    Past Medical/Surgical History:  Past Medical History:   Diagnosis Date     Bronchitis, chronic, simple (H)      Obesity      Past Surgical History:   Procedure Laterality Date     C ORAL SURGERY PROCEDURE  1994    [place] Teeth Extraction     Allergies:  No Known Allergies    Current Medications   Current Outpatient Medications   Medication     albuterol (PROAIR HFA, PROVENTIL HFA, VENTOLIN HFA) 108 (90 BASE) MCG/ACT inhaler     atorvastatin (LIPITOR) 20 MG tablet     Continuous Blood Gluc  (FREESTYLE SUKHI 14 DAY READER) MIKAELA     Continuous Blood Gluc Sensor (FREESTYLE SUKHI 14 DAY SENSOR) MISC     continuous blood glucose monitoring (FREESTYLE SUKHI) sensor     insulin aspart (NOVOLOG VIAL) 100  "UNITS/ML vial     insulin glargine (LANTUS SOLOSTAR PEN) 100 UNIT/ML pen     insulin glargine (LANTUS SOLOSTAR PEN) 100 UNIT/ML pen     insulin pen needle (BD ROVERTO U/F) 32G X 4 MM miscellaneous     insulin syringe-needle U-100 (29G X 1/2\" 0.5 ML) 29G X 1/2\" 0.5 ML miscellaneous     Multiple Vitamins-Minerals (MULTIVITAMIN ADULT PO)     order for DME     Ostomy Supplies (SKIN TAC ADHESIVE BARRIER WIPE) MISC     glucagon (GLUCAGON EMERGENCY) 1 MG kit     No current facility-administered medications for this visit.      Family History:  Family History   Problem Relation Age of Onset     Hypertension Father      Alcohol/Drug Father      Depression Father      Heart Disease Father      Depression Mother      Macular Degeneration Maternal Grandfather      Diabetes Paternal Uncle      Diabetes Paternal Uncle      Allergies Sister         pcn     Cancer No family hx of         No family history of skin cancer     Glaucoma No family hx of      Social History:  Social History     Tobacco Use     Smoking status: Former Smoker     Smokeless tobacco: Never Used   Substance Use Topics     Alcohol use: No     Comment: quit 03/2017     Additional Social History: 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    ROS:  negative except as mentioned in HPI    Exam  Blood pressure (!) 143/89, pulse 74, height 1.778 m (5' 10\"), weight 99.6 kg (219 lb 8 oz).  Gen: well appearing, nad, pleasant and conversant  HEENT: anicteric, EOMI, no proptosis or lid lag, no goiter  Ext: no swelling or edema  Feet: no deformities or ulcers, 2+ DP pulses, normal monofilament sensation. Some dryness of heels, no cracking  Neuro: A&Ox3, no tremor    Labs/Imaging    ENDO DIABETES Latest Ref Rng & Units 10/5/2018   HEMOGLOBIN A1C 0 - 5.6 %    HEMOGLOBIN A1C, POC 4.3 - 6.0 %    HGB 13.3 - 17.7 g/dL    GLUCOSE 70 - 99 mg/dL    CHOLESTEROL <200 mg/dL 124   LDL CHOLESTEROL, CALCULATED <100 mg/dL 73   HDL CHOLESTEROL >39 mg/dL 39 (L) "   VLDL-CHOLESTEROL 0 - 30 mg/dL    NON HDL CHOLESTEROL <130 mg/dL 85   TRIGLYCERIDES <150 mg/dL 59   ALBUMIN URINE MG/L mg/L 16   ALBUMIN URINE MG/G CR 0 - 17 mg/g Cr 7.76   CREATININE 0.66 - 1.25 mg/dL 0.56 (L)   POTASSIUM 3.4 - 5.3 mmol/L 4.1   ALT 0 - 70 U/L 41   AST 0 - 45 U/L 20   WBC 4.0 - 11.0 10e9/L    RBC 4.4 - 5.9 10e12/L    HGB 13.3 - 17.7 g/dL    HCT 40.0 - 53.0 %    MCV 78 - 100 fl    MCH 26.5 - 33.0 pg    MCHC 31.5 - 36.5 g/dL    RDW 10.0 - 15.0 %     - 450 10e9/L      Assessment and Plan    T1DM, reasonable control. More bolus than basal with some overnight hypoglycemia. He has Qs about sensitivity. calcuations using rules of 1500 and 500 were reviewed with patient. He calculates to 1:7 g CHO and 1 per 25 for sensitivity based on TDD of 60 units. He is in agreement with using the 1 per 25>140 correction.    Autoimmune predilection: TSH, FT4 today and annually, (and creatinine and hemoglobin for diabetes).    Patient Instructions:  Please go to the lab on 1st floor for thyroid labs    Please increase your daytime basal rate 7a-mn from 1 to 1.2 unit(s)/hr  & Reduce at mn from 1 to 0.9 units/hr    Please continue your 1:7 g CHO meal time insulin boluses    HIGH Insulin Resistance or Correction Factor of 1 for 25 mg/dL  Do Not give Correction Insulin if Pre-Meal BG less than 140.   For Pre-Meal  - 164 give 1 unit.   For Pre-Meal  - 189 give 2 units.   For Pre-Meal  - 214 give 3 units.   For Pre-Meal  - 239 give 4 units.   For Pre-Meal  - 264 give 5 units.   For Pre-Meal  - 289 give 6 units.   For Pre-Meal  - 314 give 7 units.   For Pre-Meal  - 339 give 8 units.   For Pre-Meal  - 364 give 9 units.  For Pre-Meal BG greater than or equal to 365 give 10 units  To be given with prandial insulin, and based on pre-meal blood glucose.   Notify provider if glucose greater than or equal to 350 mg/dL after administration of correction dose.     HIGH  Insulin Resistance or Correction Factor of 1 for 25 mg/dL  Do Not give Bedtime Correction Insulin if BG less than 200.   For  - 224 give 1 units.   For  - 249 give 2 units.   For  - 274 give 3 units.   For  - 299 give 4 units.   For  - 324 give 5 units.   For  - 349 give 6 units.   For BG greater than or equal to 350 give 7 units.   Notify provider if glucose greater than or equal to 350 mg/dL after administration of correction dose.    1,500 calorie meal plan to lose 1 lbs weekly without exercise (based on REE calc of 2,031)  Use meal replacements such as Chanel's meals, Lean Cuisines, Healthy Choice, Smart Ones, Weight Watchers Meals, and Slim Fast and Glucerna shakes and supplement with fresh fruits and vegetables  Please drink a lot of water daily. Most people typically need about 2 liters of water daily and more if they are exercising, have a large weight, or have a fever or illness. Add Crystal Light for flavoring if desired. But no pop with calories in it.  Please keep a food journal of what you eat, calories in what you eat  Consider using applications for smart phones such as JumpStart, [employer], [employer], LoseIt, Tap&Track, and RelaxM.  Focus on wet volumetrics, meaning, eat more foods that are high in water and fiber such as fruits and vegetables in order to get that full feeling. These are also good for your overall health as well.  Check out Dr. Jolanta Edward from [facility] - she has cookbooks with low calorie volumetric recipes  You can try Let's Dish to help you prepare meals for you and your family. Often times, the caloric and nutrition data and serving sizes are available for this food. This can be a time saving maneuver. The website can give you more information http://www.[employer]/  [employer] has Let's Dish & fresh low calorie salads  Check out Hello Fresh at https://www.[employer].[facility]/food-boxes/classic-box/  Try Cooking Light recipes for low  calorie meal preparation and planning  Other food plan options you can search for on the internet and check out include: Mando DELGADO, [facility]    Consider hiring a  to develop a structured progressive workout plan that includes both cardio and resistance training. Obesity is a disease according to the IRS, so you may be able to use some pre-tax dollars from your medical flexible spending account if you get a letter from your doctor and/or fill out a plan-specific form.    RTC: Simran quach 3-6 months, annually with me    Alejandra Olguin MD, MPH  Attending Physician  Diabetes/Endocrinology/Metabolism    Time: 40 min spent on chart review, evaluation, management, counseling, education, & motivational interviewing with greater than 50% of the total time was spent on counseling and coordinating care

## 2019-11-09 ENCOUNTER — HEALTH MAINTENANCE LETTER (OUTPATIENT)
Age: 44
End: 2019-11-09

## 2020-01-02 ENCOUNTER — OFFICE VISIT (OUTPATIENT)
Dept: ENDOCRINOLOGY | Facility: CLINIC | Age: 45
End: 2020-01-02
Payer: COMMERCIAL

## 2020-01-02 VITALS
HEIGHT: 70 IN | BODY MASS INDEX: 34.96 KG/M2 | WEIGHT: 244.2 LBS | SYSTOLIC BLOOD PRESSURE: 149 MMHG | DIASTOLIC BLOOD PRESSURE: 87 MMHG | HEART RATE: 69 BPM

## 2020-01-02 DIAGNOSIS — E10.9 TYPE 1 DIABETES MELLITUS WITHOUT COMPLICATION (H): Primary | ICD-10-CM

## 2020-01-02 LAB — HBA1C MFR BLD: 8.1 % (ref 4.3–6)

## 2020-01-02 RX ORDER — ATORVASTATIN CALCIUM 20 MG/1
20 TABLET, FILM COATED ORAL DAILY
Qty: 90 TABLET | Refills: 3 | Status: SHIPPED | OUTPATIENT
Start: 2020-01-02 | End: 2020-03-26

## 2020-01-02 ASSESSMENT — MIFFLIN-ST. JEOR: SCORE: 2012.9

## 2020-01-02 ASSESSMENT — PAIN SCALES - GENERAL: PAINLEVEL: MODERATE PAIN (4)

## 2020-01-02 NOTE — LETTER
1/2/2020       RE: Jake Brothers  [address]  [facility] [address]     Dear Colleague,    Thank you for referring your patient, Jake Brothers, to the [facility] ENDOCRINOLOGY at [facility]. Please see a copy of my visit note below.    HPI  Jake Brothers is a 44 year old male with type 1 diabetes mellitus here today for a follow-up visit.  Jake was diagnosed with type 1 diabetes mellitus in March 2018.  Pt's paternal uncle has hx of type 1 diabetes.  Jake's history is significant for celiac, vitiligo, obesity and hx of hepatic steatosis.  Pt has no known history of retinopathy, nephropathy or neuropathy.  For his diabetes, he is currently using a Tandem insulin pump and DexcomG6.  His basal insulin rates are:  Midnight = 1.2 units/hr x 24 hrs.  His total 24 hr basal insulin dose is 28.8 units/24 hrs.  His I/C ratio is 1:8 and sensitivity 25.  Pt's A1C is 8.1 % today.  His previous A1C was 7.6 % in 7/2019.  He has gained weight and has been less active. His weight is up approx 25 lbs since I last saw him.  We downloaded his insulin pump and Dexcom data today.  His average glucose was 174 with SD 56 over the past 30 days.  On ROS today, pt denies frequent headaches, blurred vision, nausea, vomiting, shortness of breath at rest or chronic cough.  He denies chest pain, abdominal pain, diarrhea, dysuria, hematuria or foot ulcers.  Patient has no numbness tingling or pain in his toes or fingers.  He continues to follow a gluten-free diet given his hx of celiac disease.    Diabetes Care  Retinopathy:none; pt seen by Oph in 3/2018.  Nephropathy:none; urine microalbuminuria was + at time when his A1C was > 15 %. Recheck urine microalbuminuria was negative in Oct 2018.  Neuropathy:none.  Foot Exam: no ulcers.  Taking aspirin: no.  Lipids: LDL 73 in Oct 2018. Pt is taking Lipitor daily.    ROS  Please see under history of present illness.    Allergies  No Known  "Allergies    Medications  Current Outpatient Medications   Medication Sig Dispense Refill     albuterol (PROAIR HFA, PROVENTIL HFA, VENTOLIN HFA) 108 (90 BASE) MCG/ACT inhaler Inhale 2 puffs into the lungs every 6 hours as needed for shortness of breath / dyspnea or wheezing 1 Inhaler 0     atorvastatin (LIPITOR) 20 MG tablet Take 1 tablet (20 mg) by mouth daily 90 tablet 3     insulin aspart (NOVOLOG VIAL) 100 UNITS/ML vial Use 70 units per day via insulin pump (Max dose 70 units) 30 mL 11     insulin pen needle (BD ROVERTO U/F) 32G X 4 MM miscellaneous USE SIX PEN NEEDLES DAILY OR AS DIRECTED 600 each 3     insulin syringe-needle U-100 (29G X 1/2\" 0.5 ML) 29G X 1/2\" 0.5 ML miscellaneous Use 4 syringes daily or as directed. 100 each 1     Multiple Vitamins-Minerals (MULTIVITAMIN ADULT PO)        order for DME Equipment being ordered: right, wrist brace with thumb spica 1 Device 0     Ostomy Supplies (SKIN TAC ADHESIVE BARRIER WIPE) MISC 1 each every 3 days 50 each 11     glucagon (GLUCAGON EMERGENCY) 1 MG kit Inject 1 mg into the muscle once for 1 dose 1 mg 1     insulin glargine (LANTUS SOLOSTAR PEN) 100 UNIT/ML pen Inject up to 30 units daily subcutaneously (Patient not taking: Reported on 1/2/2020) 15 mL 11       Family History  family history includes Alcohol/Drug in his father; Allergies in his sister; Depression in his father and mother; Diabetes in his paternal uncle and paternal uncle; Heart Disease in his father; Hypertension in his father; Macular Degeneration in his maternal grandfather.    Social History   reports that he has quit smoking. He has never used smokeless tobacco. He reports that he does not drink alcohol or use drugs.     Past Medical History  Past Medical History:   Diagnosis Date     Bronchitis, chronic, simple (H)      Obesity        Past Surgical History:   Procedure Laterality Date     C ORAL SURGERY PROCEDURE  1994    [place] Teeth Extraction       Physical Exam  BP (!) 149/87   Pulse 69 " "  Ht 1.784 m (5' 10.25\")   Wt 110.8 kg (244 lb 3.2 oz)   BMI 34.79 kg/m     Body mass index is 34.79 kg/m .    GENERAL : In no apparent distress    RESULTS  Creatinine   Date Value Ref Range Status   10/05/2018 0.56 (L) 0.66 - 1.25 mg/dL Final     GFR Estimate   Date Value Ref Range Status   10/05/2018 >90 >60 mL/min/1.7m2 Final     Comment:     Non  GFR Calc     Hemoglobin A1C   Date Value Ref Range Status   05/14/2018 8.1 (H) 0 - 5.6 % Final     Comment:     Normal <5.7% Prediabetes 5.7-6.4%  Diabetes 6.5% or higher - adopted from ADA   consensus guidelines.       Potassium   Date Value Ref Range Status   10/05/2018 4.1 3.4 - 5.3 mmol/L Final     ALT   Date Value Ref Range Status   10/05/2018 41 0 - 70 U/L Final     AST   Date Value Ref Range Status   10/05/2018 20 0 - 45 U/L Final     TSH   Date Value Ref Range Status   10/05/2018 0.48 0.40 - 4.00 mU/L Final       Cholesterol   Date Value Ref Range Status   10/05/2018 124 <200 mg/dL Final   03/05/2018 202 (H) <200 mg/dL Final     Comment:     Desirable:       <200 mg/dl     HDL Cholesterol   Date Value Ref Range Status   10/05/2018 39 (L) >39 mg/dL Final   03/05/2018 25 (L) >39 mg/dL Final     LDL Cholesterol Calculated   Date Value Ref Range Status   10/05/2018 73 <100 mg/dL Final     Comment:     Desirable:       <100 mg/dl   03/05/2018 108 (H) <100 mg/dL Final     Comment:     Above desirable:  100-129 mg/dl  Borderline High:  130-159 mg/dL  High:             160-189 mg/dL  Very high:       >189 mg/dl       Triglycerides   Date Value Ref Range Status   10/05/2018 59 <150 mg/dL Final     Comment:     Fasting specimen   03/05/2018 345 (H) <150 mg/dL Final     Comment:     Borderline high:  150-199 mg/dl  High:             200-499 mg/dl  Very high:       >499 mg/dl  Fasting specimen       Cholesterol/HDL Ratio   Date Value Ref Range Status   09/25/2014 4.5 0.0 - 5.0 Final   02/23/2009 6.8 (H) 0.0 - 5.0 Final       A1C  8.1 % " today.    ASSESSMENT/PLAN:    1. TYPE 1 DIABETES MELLITUS: Type 1 diabetes with an A1C of 8.1 % today.  Jake plans to work on weight loss and exercising.  I also talked to him today about the software upgrade for his Tandem pump with control IQ technology. He will check on this info on the Tandem website and let me know if he needs me to order the software upgrade.  No change in basal insulin rate today.  Pt was seen by Oph in Oct 2019 without retinopathy.  His urine microalbuminuria negative in Oct 2018 with normal creat/GFR.  His feet are ok.  Pt is normotensive.  Pt's TSH was normal in Oct 2018.  I placed an order for annual fasting diabetes labs.  Pt had the flu vaccine this season.    2.  CELIAC: Stable on gluten free diet.    3.  HYPERLIPIDEMIA: LDL 73 in Oct 2018 on Lipitor.  Fasting lipid panel ordered.    4.  Return to Endocrine Clinic to see me in 3 months.     Bety Dacosta PA-C

## 2020-01-02 NOTE — PATIENT INSTRUCTIONS
Monitor your weight, portion control with meals and increase activity.  Check with Tandem online regarding the tslimx2 with control IQ.    See me 3 months.  Bety Dacosta

## 2020-01-08 NOTE — PROGRESS NOTES
HPI  Jake Brothers is a 44 year old male with type 1 diabetes mellitus here today for a follow-up visit.  Jake was diagnosed with type 1 diabetes mellitus in March 2018.  Pt's paternal uncle has hx of type 1 diabetes.  Jake's history is significant for celiac, vitiligo, obesity and hx of hepatic steatosis.  Pt has no known history of retinopathy, nephropathy or neuropathy.  For his diabetes, he is currently using a Tandem insulin pump and DexcomG6.  His basal insulin rates are:  Midnight = 1.2 units/hr x 24 hrs.  His total 24 hr basal insulin dose is 28.8 units/24 hrs.  His I/C ratio is 1:8 and sensitivity 25.  Pt's A1C is 8.1 % today.  His previous A1C was 7.6 % in 7/2019.  He has gained weight and has been less active. His weight is up approx 25 lbs since I last saw him.  We downloaded his insulin pump and Dexcom data today.  His average glucose was 174 with SD 56 over the past 30 days.  On ROS today, pt denies frequent headaches, blurred vision, nausea, vomiting, shortness of breath at rest or chronic cough.  He denies chest pain, abdominal pain, diarrhea, dysuria, hematuria or foot ulcers.  Patient has no numbness tingling or pain in his toes or fingers.  He continues to follow a gluten-free diet given his hx of celiac disease.    Diabetes Care  Retinopathy:none; pt seen by Oph in 3/2018.  Nephropathy:none; urine microalbuminuria was + at time when his A1C was > 15 %. Recheck urine microalbuminuria was negative in Oct 2018.  Neuropathy:none.  Foot Exam: no ulcers.  Taking aspirin: no.  Lipids: LDL 73 in Oct 2018. Pt is taking Lipitor daily.    ROS  Please see under history of present illness.    Allergies  No Known Allergies    Medications  Current Outpatient Medications   Medication Sig Dispense Refill     albuterol (PROAIR HFA, PROVENTIL HFA, VENTOLIN HFA) 108 (90 BASE) MCG/ACT inhaler Inhale 2 puffs into the lungs every 6 hours as needed for shortness of breath / dyspnea or wheezing 1 Inhaler 0      "atorvastatin (LIPITOR) 20 MG tablet Take 1 tablet (20 mg) by mouth daily 90 tablet 3     insulin aspart (NOVOLOG VIAL) 100 UNITS/ML vial Use 70 units per day via insulin pump (Max dose 70 units) 30 mL 11     insulin pen needle (BD ROVERTO U/F) 32G X 4 MM miscellaneous USE SIX PEN NEEDLES DAILY OR AS DIRECTED 600 each 3     insulin syringe-needle U-100 (29G X 1/2\" 0.5 ML) 29G X 1/2\" 0.5 ML miscellaneous Use 4 syringes daily or as directed. 100 each 1     Multiple Vitamins-Minerals (MULTIVITAMIN ADULT PO)        order for DME Equipment being ordered: right, wrist brace with thumb spica 1 Device 0     Ostomy Supplies (SKIN TAC ADHESIVE BARRIER WIPE) MISC 1 each every 3 days 50 each 11     glucagon (GLUCAGON EMERGENCY) 1 MG kit Inject 1 mg into the muscle once for 1 dose 1 mg 1     insulin glargine (LANTUS SOLOSTAR PEN) 100 UNIT/ML pen Inject up to 30 units daily subcutaneously (Patient not taking: Reported on 1/2/2020) 15 mL 11       Family History  family history includes Alcohol/Drug in his father; Allergies in his sister; Depression in his father and mother; Diabetes in his paternal uncle and paternal uncle; Heart Disease in his father; Hypertension in his father; Macular Degeneration in his maternal grandfather.    Social History   reports that he has quit smoking. He has never used smokeless tobacco. He reports that he does not drink alcohol or use drugs.     Past Medical History  Past Medical History:   Diagnosis Date     Bronchitis, chronic, simple (H)      Obesity        Past Surgical History:   Procedure Laterality Date     C ORAL SURGERY PROCEDURE  1994    [place] Teeth Extraction       Physical Exam  BP (!) 149/87   Pulse 69   Ht 1.784 m (5' 10.25\")   Wt 110.8 kg (244 lb 3.2 oz)   BMI 34.79 kg/m    Body mass index is 34.79 kg/m .    GENERAL : In no apparent distress    RESULTS  Creatinine   Date Value Ref Range Status   10/05/2018 0.56 (L) 0.66 - 1.25 mg/dL Final     GFR Estimate   Date Value Ref Range " Status   10/05/2018 >90 >60 mL/min/1.7m2 Final     Comment:     Non  GFR Calc     Hemoglobin A1C   Date Value Ref Range Status   05/14/2018 8.1 (H) 0 - 5.6 % Final     Comment:     Normal <5.7% Prediabetes 5.7-6.4%  Diabetes 6.5% or higher - adopted from ADA   consensus guidelines.       Potassium   Date Value Ref Range Status   10/05/2018 4.1 3.4 - 5.3 mmol/L Final     ALT   Date Value Ref Range Status   10/05/2018 41 0 - 70 U/L Final     AST   Date Value Ref Range Status   10/05/2018 20 0 - 45 U/L Final     TSH   Date Value Ref Range Status   10/05/2018 0.48 0.40 - 4.00 mU/L Final       Cholesterol   Date Value Ref Range Status   10/05/2018 124 <200 mg/dL Final   03/05/2018 202 (H) <200 mg/dL Final     Comment:     Desirable:       <200 mg/dl     HDL Cholesterol   Date Value Ref Range Status   10/05/2018 39 (L) >39 mg/dL Final   03/05/2018 25 (L) >39 mg/dL Final     LDL Cholesterol Calculated   Date Value Ref Range Status   10/05/2018 73 <100 mg/dL Final     Comment:     Desirable:       <100 mg/dl   03/05/2018 108 (H) <100 mg/dL Final     Comment:     Above desirable:  100-129 mg/dl  Borderline High:  130-159 mg/dL  High:             160-189 mg/dL  Very high:       >189 mg/dl       Triglycerides   Date Value Ref Range Status   10/05/2018 59 <150 mg/dL Final     Comment:     Fasting specimen   03/05/2018 345 (H) <150 mg/dL Final     Comment:     Borderline high:  150-199 mg/dl  High:             200-499 mg/dl  Very high:       >499 mg/dl  Fasting specimen       Cholesterol/HDL Ratio   Date Value Ref Range Status   09/25/2014 4.5 0.0 - 5.0 Final   02/23/2009 6.8 (H) 0.0 - 5.0 Final       A1C  8.1 % today.    ASSESSMENT/PLAN:    1. TYPE 1 DIABETES MELLITUS: Type 1 diabetes with an A1C of 8.1 % today.  Jake plans to work on weight loss and exercising.  I also talked to him today about the software upgrade for his Tandem pump with control IQ technology. He will check on this info on the Tandem website  and let me know if he needs me to order the software upgrade.  No change in basal insulin rate today.  Pt was seen by Oph in Oct 2019 without retinopathy.  His urine microalbuminuria negative in Oct 2018 with normal creat/GFR.  His feet are ok.  Pt is normotensive.  Pt's TSH was normal in Oct 2018.  I placed an order for annual fasting diabetes labs.  Pt had the flu vaccine this season.    2.  CELIAC: Stable on gluten free diet.    3.  HYPERLIPIDEMIA: LDL 73 in Oct 2018 on Lipitor.  Fasting lipid panel ordered.    4.  Return to Endocrine Clinic to see me in 3 months.

## 2020-02-20 ENCOUNTER — OFFICE VISIT (OUTPATIENT)
Dept: URGENT CARE | Facility: URGENT CARE | Age: 45
End: 2020-02-20
Payer: COMMERCIAL

## 2020-02-20 VITALS
BODY MASS INDEX: 34.93 KG/M2 | SYSTOLIC BLOOD PRESSURE: 136 MMHG | DIASTOLIC BLOOD PRESSURE: 88 MMHG | TEMPERATURE: 98.1 F | OXYGEN SATURATION: 99 % | HEIGHT: 70 IN | WEIGHT: 244 LBS | HEART RATE: 71 BPM

## 2020-02-20 DIAGNOSIS — L30.9 ECZEMA, UNSPECIFIED TYPE: Primary | ICD-10-CM

## 2020-02-20 PROCEDURE — 99213 OFFICE O/P EST LOW 20 MIN: CPT | Performed by: PREVENTIVE MEDICINE

## 2020-02-20 RX ORDER — FLUOCINONIDE 0.5 MG/G
1 CREAM TOPICAL 2 TIMES DAILY
Qty: 120 G | Refills: 1 | Status: SHIPPED | OUTPATIENT
Start: 2020-02-20 | End: 2020-07-23

## 2020-02-20 ASSESSMENT — MIFFLIN-ST. JEOR: SCORE: 2007

## 2020-02-21 NOTE — PROGRESS NOTES
"SUBJECTIVE:  Jake Brothers is a 45 year old male who presents to the clinic today for a rash.  Onset of rash was 2 day(s) ago.   Rash is gradual onset.  Location of the rash: arm, lower and lower leg.  Quality/symptoms of rash: itching   Symptoms are moderate and rash seems to be worsening.  Previous history of a similar rash? Yes: eczema  Recent exposure history: none known    Associated symptoms include: nothing.    Past Medical History:   Diagnosis Date     Bronchitis, chronic, simple (H)      Obesity      Current Outpatient Medications   Medication Sig Dispense Refill     albuterol (PROAIR HFA, PROVENTIL HFA, VENTOLIN HFA) 108 (90 BASE) MCG/ACT inhaler Inhale 2 puffs into the lungs every 6 hours as needed for shortness of breath / dyspnea or wheezing 1 Inhaler 0     atorvastatin (LIPITOR) 20 MG tablet Take 1 tablet (20 mg) by mouth daily 90 tablet 3     fluocinonide 0.05 % EX external cream Apply 1 g topically 2 times daily for 14 days 120 g 1     insulin aspart (NOVOLOG VIAL) 100 UNITS/ML vial Use 70 units per day via insulin pump (Max dose 70 units) 30 mL 11     insulin glargine (LANTUS SOLOSTAR PEN) 100 UNIT/ML pen Inject up to 30 units daily subcutaneously 15 mL 11     insulin pen needle (BD ROVERTO U/F) 32G X 4 MM miscellaneous USE SIX PEN NEEDLES DAILY OR AS DIRECTED 600 each 3     insulin syringe-needle U-100 (29G X 1/2\" 0.5 ML) 29G X 1/2\" 0.5 ML miscellaneous Use 4 syringes daily or as directed. 100 each 1     Multiple Vitamins-Minerals (MULTIVITAMIN ADULT PO)        Ostomy Supplies (SKIN TAC ADHESIVE BARRIER WIPE) MISC 1 each every 3 days 50 each 11     glucagon (GLUCAGON EMERGENCY) 1 MG kit Inject 1 mg into the muscle once for 1 dose 1 mg 1     order for DME Equipment being ordered: right, wrist brace with thumb spica (Patient not taking: Reported on 2/20/2020) 1 Device 0     Social History     Tobacco Use     Smoking status: Former Smoker     Smokeless tobacco: Never Used   Substance Use Topics     " "Alcohol use: No     Comment: quit 03/2017       ROS:  CONSTITUTIONAL:NEGATIVE for fever, chills, change in weight  EYES: NEGATIVE for vision changes or irritation  ENT/MOUTH: NEGATIVE for ear, mouth and throat problems  RESP:NEGATIVE for significant cough or SOB  CV: NEGATIVE for chest pain, palpitations or peripheral edema  GI: NEGATIVE for nausea, abdominal pain, heartburn, or change in bowel habits  MUSCULOSKELETAL: NEGATIVE for significant arthralgias or myalgia  NEURO: NEGATIVE for weakness, dizziness or paresthesias  ENDOCRINE: NEGATIVE for temperature intolerance, skin/hair changes    EXAM:   /88   Pulse 71   Temp 98.1  F (36.7  C) (Oral)   Ht 1.784 m (5' 10.25\")   Wt 110.7 kg (244 lb)   SpO2 99%   BMI 34.76 kg/m    GENERAL: alert, no acute distress.  SKIN: Rash description:    Distribution: localized  Location: arm, lower and lower leg    Color: red,  Lesion type: macular, confluent with excoriation  GENERAL APPEARANCE: healthy, alert and no distress  EYES: EOMI,  PERRL, conjunctiva clear  NECK: supple, non-tender to palpation, no adenopathy noted  RESP: lungs clear to auscultation - no rales, rhonchi or wheezes  CV: regular rates and rhythm, normal S1 S2, no murmur noted    ASSESSMENT:  Eczema  Daily moisturizer  Fluocinonide cream  Follow up in clinic in 1-2 days for recheck      PLAN:  1) See today's orders.  2) Follow-up with primary clinic if not improving    "

## 2020-03-26 ENCOUNTER — MYC MEDICAL ADVICE (OUTPATIENT)
Dept: ENDOCRINOLOGY | Facility: CLINIC | Age: 45
End: 2020-03-26

## 2020-03-26 RX ORDER — ATORVASTATIN CALCIUM 20 MG/1
20 TABLET, FILM COATED ORAL DAILY
Qty: 90 TABLET | Refills: 3 | Status: SHIPPED | OUTPATIENT
Start: 2020-03-26 | End: 2021-04-05

## 2020-03-26 NOTE — TELEPHONE ENCOUNTER
Hi I was wondering if there was any way I could stock up on insulin. Was hoping to get a couple months supply, 90 days if that s not to much to ask. Requesting in anticipation of increased restrictions regarding the COVID19 virus. Also if I could request extra Atrivrostatin as well.  Thanks   [facility] [place]    My chart message sent to pt regarding Atorvastatin.  -- 90 day: 3 RF sent 1-2-20, pt to check with pharm if insurance will allow early refill.        insulin glargine (LANTUS SOLOSTAR PEN) 100 UNIT/ML pen       Last Written Prescription Date:  7-10-19  Last Fill Quantity: 15 ml,   # refills: 11  Last Office Visit : 1-2-20  Future Office visit:  none        insulin aspart (NOVOLOG VIAL) 100 UNITS/ML vial     Last Written Prescription Date:  7-26-19  Last Fill Quantity: 30 ml,   # refills: 11  Routing refill request to provider for review/approval because:  Insulin - refilled per clinic   please address Insulin RF,  ? 90 day RF

## 2020-03-30 ENCOUNTER — MYC MEDICAL ADVICE (OUTPATIENT)
Dept: ENDOCRINOLOGY | Facility: CLINIC | Age: 45
End: 2020-03-30

## 2020-06-29 ENCOUNTER — MYC MEDICAL ADVICE (OUTPATIENT)
Dept: ENDOCRINOLOGY | Facility: CLINIC | Age: 45
End: 2020-06-29

## 2020-06-29 DIAGNOSIS — E10.9 TYPE 1 DIABETES MELLITUS WITHOUT COMPLICATION (H): Primary | ICD-10-CM

## 2020-06-29 RX ORDER — PROCHLORPERAZINE 25 MG/1
1 SUPPOSITORY RECTAL
Qty: 9 EACH | Refills: 3 | Status: SHIPPED | OUTPATIENT
Start: 2020-06-29 | End: 2021-07-30

## 2020-06-29 RX ORDER — PROCHLORPERAZINE 25 MG/1
1 SUPPOSITORY RECTAL
Qty: 1 EACH | Refills: 3 | Status: SHIPPED | OUTPATIENT
Start: 2020-06-29 | End: 2021-07-30

## 2020-06-29 RX ORDER — INFUSION SET FOR INSULIN PUMP
1 INFUSION SETS-PARAPHERNALIA MISCELLANEOUS
Qty: 30 EACH | Refills: 3 | Status: SHIPPED | OUTPATIENT
Start: 2020-06-29 | End: 2021-07-30

## 2020-06-30 RX ORDER — PROCHLORPERAZINE 25 MG/1
SUPPOSITORY RECTAL
Qty: 1 EACH | Refills: 3 | OUTPATIENT
Start: 2020-06-30

## 2020-06-30 RX ORDER — PROCHLORPERAZINE 25 MG/1
SUPPOSITORY RECTAL
Qty: 39 EACH | Refills: 0 | OUTPATIENT
Start: 2020-06-30

## 2020-06-30 RX ORDER — INFUSION SET FOR INSULIN PUMP
INFUSION SETS-PARAPHERNALIA MISCELLANEOUS
Qty: 30 EACH | OUTPATIENT
Start: 2020-06-30

## 2020-07-23 NOTE — PROGRESS NOTES
"Jake Brothers is a 45 year old male who is being evaluated via a billable video visit.      The patient has been notified of following:     \"This video visit will be conducted via a call between you and your physician/provider. We have found that certain health care needs can be provided without the need for an in-person physical exam.  This service lets us provide the care you need with a video conversation.  If a prescription is necessary we can send it directly to your pharmacy.  If lab work is needed we can place an order for that and you can then stop by our lab to have the test done at a later time.    Video visits are billed at different rates depending on your insurance coverage.  Please reach out to your insurance provider with any questions.    If during the course of the call the physician/provider feels a video visit is not appropriate, you will not be charged for this service.\"    Patient has given verbal consent for Video visit? Yes    How would you like to obtain your AVS? [email or web address]     TEXT video visit link: [phone]    Will anyone else be joining your video visit? No    Video-Visit Details    Type of service:  Video Visit    Distant Location (provider location):  [facility] ENDOCRINOLOGY     Laly Post MA    Patients Glucose Data was Sent via Email    Due to the COVID 19 pandemic this visit was converted to a video visit in order to help prevent spread of infection in this patient and the general population.    Time of start: 9:00 am  Time of end: 9:18 am  Total duration of video visit: 18 minutes.    HPI  Jake Brothers is a 45 year old male with type 1 diabetes mellitus. Video visit today for diabetes follow up  Jake was diagnosed with type 1 diabetes mellitus in March 2018.  Pt's paternal uncle has hx of type 1 diabetes.  Jake's history is significant for celiac, vitiligo, obesity and hx of hepatic steatosis.  Pt has no known history of retinopathy, nephropathy or neuropathy.  For his " diabetes, he is currently using a Tandem insulin pump-basal IQ and DexcomG6 sensor.  His basal insulin rate is 1.4 units/hr x 24 hrs. I/C ratio is 1:8.  Pt's A1C was 8.1 % in Jan 2020.  His previous A1C was 7.6 % in 7/2019.  I do not have his insulin pump download data today.  I did review his DexcomG6 sensor download which showed an average glucose 136 with SD 52 and estimated A1C of 6.6 %.  His blood sugars are in target 75 % of the time, above target 20 % of the time and below target 1 % of the time.  Jake has been having overnight hypoglycemia.  He has been more active and states he has lost weight being home full time with the kids ( ages 5 and 10 months ).  On ROS today, no abdominal pain or diarrhea.  He denies blurred vision, n/v, SOB at rest, cough, fever or chills.  No chest pain, dysuria, hematuria or foot ulcers.  No numbness or tingling in his feet or hands.  He continues to follow a gluten-free diet given his hx of celiac disease.    Diabetes Care  Retinopathy:none; pt seen by Oph in 8/2019 and has Oph appointment scheduled for Aug 2020.  Nephropathy:none; urine microalbuminuria was + at time when his A1C was > 15 %. Recheck urine microalbuminuria was negative in Oct 2018.  Neuropathy:none.  Foot Exam: no exam today.  Taking aspirin: no.  Lipids: LDL 73 in Oct 2018. Pt is taking Lipitor daily.  CAD: no.  Mental Health: no depression.  Insulin: Tandem insulin pump-basal IQ.  Testing: DexcomG6 sensor.    ROS  Please see under history of present illness.    Allergies  No Known Allergies    Medications  Current Outpatient Medications   Medication Sig Dispense Refill     atorvastatin (LIPITOR) 20 MG tablet Take 1 tablet (20 mg) by mouth daily 90 tablet 3     Continuous Blood Gluc Sensor (DEXCOM G6 SENSOR) MISC 1 each every 10 days 9 each 3     Continuous Blood Gluc Transmit (DEXCOM G6 TRANSMITTER) MISC 1 each every 3 months 1 each 3     insulin aspart (NOVOLOG VIAL) 100 UNITS/ML vial Use 70 units per day  "via insulin pump (Max dose 70 units) 4 month supply 90 mL 3     insulin cartridge (T:SLIM 3ML) misc pump supply Insulin cartridge to be used with pump as directed.  Change every 3 days or as directed. 30 each 3     Insulin Infusion Pump Supplies (AUTOSOFT 90 INFUSION SET) MISC 1 each every 3 days Size and tubing length per pt preference 30 each 3     insulin pen needle (BD ROVERTO U/F) 32G X 4 MM miscellaneous USE SIX PEN NEEDLES DAILY OR AS DIRECTED 600 each 3     insulin syringe-needle U-100 (29G X 1/2\" 0.5 ML) 29G X 1/2\" 0.5 ML miscellaneous Use 4 syringes daily or as directed. 100 each 1     Multiple Vitamins-Minerals (MULTIVITAMIN ADULT PO)        Ostomy Supplies (SKIN TAC ADHESIVE BARRIER WIPE) MISC 1 each every 3 days 50 each 11     albuterol (PROAIR HFA, PROVENTIL HFA, VENTOLIN HFA) 108 (90 BASE) MCG/ACT inhaler Inhale 2 puffs into the lungs every 6 hours as needed for shortness of breath / dyspnea or wheezing (Patient not taking: Reported on 7/23/2020) 1 Inhaler 0     glucagon (GLUCAGON EMERGENCY) 1 MG kit Inject 1 mg into the muscle once for 1 dose 1 mg 1       Family History  family history includes Alcohol/Drug in his father; Allergies in his sister; Depression in his father and mother; Diabetes in his paternal uncle and paternal uncle; Heart Disease in his father; Hypertension in his father; Macular Degeneration in his maternal grandfather.    Social History   reports that he has quit smoking. He has never used smokeless tobacco. He reports that he does not drink alcohol or use drugs.     Past Medical History  Past Medical History:   Diagnosis Date     Bronchitis, chronic, simple (H)      Obesity        Past Surgical History:   Procedure Laterality Date     C ORAL SURGERY PROCEDURE  1994    [place] Teeth Extraction       Physical Exam    No exam today.      RESULTS  Creatinine   Date Value Ref Range Status   10/05/2018 0.56 (L) 0.66 - 1.25 mg/dL Final     GFR Estimate   Date Value Ref Range Status "   10/05/2018 >90 >60 mL/min/1.7m2 Final     Comment:     Non  GFR Calc     Hemoglobin A1C   Date Value Ref Range Status   05/14/2018 8.1 (H) 0 - 5.6 % Final     Comment:     Normal <5.7% Prediabetes 5.7-6.4%  Diabetes 6.5% or higher - adopted from ADA   consensus guidelines.       Potassium   Date Value Ref Range Status   10/05/2018 4.1 3.4 - 5.3 mmol/L Final     ALT   Date Value Ref Range Status   10/05/2018 41 0 - 70 U/L Final     AST   Date Value Ref Range Status   10/05/2018 20 0 - 45 U/L Final     TSH   Date Value Ref Range Status   10/05/2018 0.48 0.40 - 4.00 mU/L Final       Cholesterol   Date Value Ref Range Status   10/05/2018 124 <200 mg/dL Final   03/05/2018 202 (H) <200 mg/dL Final     Comment:     Desirable:       <200 mg/dl     HDL Cholesterol   Date Value Ref Range Status   10/05/2018 39 (L) >39 mg/dL Final   03/05/2018 25 (L) >39 mg/dL Final     LDL Cholesterol Calculated   Date Value Ref Range Status   10/05/2018 73 <100 mg/dL Final     Comment:     Desirable:       <100 mg/dl   03/05/2018 108 (H) <100 mg/dL Final     Comment:     Above desirable:  100-129 mg/dl  Borderline High:  130-159 mg/dL  High:             160-189 mg/dL  Very high:       >189 mg/dl       Triglycerides   Date Value Ref Range Status   10/05/2018 59 <150 mg/dL Final     Comment:     Fasting specimen   03/05/2018 345 (H) <150 mg/dL Final     Comment:     Borderline high:  150-199 mg/dl  High:             200-499 mg/dl  Very high:       >499 mg/dl  Fasting specimen       Cholesterol/HDL Ratio   Date Value Ref Range Status   09/25/2014 4.5 0.0 - 5.0 Final   02/23/2009 6.8 (H) 0.0 - 5.0 Final       ASSESSMENT/PLAN:    1. TYPE 1 DIABETES MELLITUS: Jake has been having overnight hypoglycemia.  I asked him to reduce his midnight basal insulin rate 1.3 units/hr.  New basal insulin rates:  Midnight = 1.3 units/hr.  6 am = 1.4 units/hr.  If he continues to have hypoglycemia, he is to notify me.  I will have Kristin Stockton  CDE call pt to discuss use of control IQ upgrade with his insulin pump.  He is to wear his DexcomG6 sensor full time.  Discussed reducing either basal rate or bolus prior to walking/exercise to help prevent hypoglycemia.  Pt was seen by Oph in Aug 2019 without retinopathy. He will be seeing Oph in Aug 2020.  His urine microalbuminuria negative in Oct 2018 with normal creat/GFR.  Jake denies any foot ulcers at this time. No sx of neuropathy.  Reminded him he has annual fasting diabetes labs ordered including an A1C.    2.  CELIAC: Stable on gluten free diet.    3.  HYPERLIPIDEMIA: LDL 73 in Oct 2018 on Lipitor.  Fasting lipid panel ordered.    4. FOLLOW UP: with me in 3 months.

## 2020-07-24 ENCOUNTER — VIRTUAL VISIT (OUTPATIENT)
Dept: ENDOCRINOLOGY | Facility: CLINIC | Age: 45
End: 2020-07-24
Payer: COMMERCIAL

## 2020-07-24 DIAGNOSIS — E10.65 TYPE 1 DIABETES MELLITUS WITH HYPERGLYCEMIA (H): Primary | ICD-10-CM

## 2020-07-24 NOTE — LETTER
"7/24/2020       RE: Jake Brothers  [address]  [facility] [address]     Dear Colleague,    Thank you for referring your patient, Jake Brothers, to the [facility] ENDOCRINOLOGY at [facility]. Please see a copy of my visit note below.    Jake Brothers is a 45 year old male who is being evaluated via a billable video visit.      The patient has been notified of following:     \"This video visit will be conducted via a call between you and your physician/provider. We have found that certain health care needs can be provided without the need for an in-person physical exam.  This service lets us provide the care you need with a video conversation.  If a prescription is necessary we can send it directly to your pharmacy.  If lab work is needed we can place an order for that and you can then stop by our lab to have the test done at a later time.    Video visits are billed at different rates depending on your insurance coverage.  Please reach out to your insurance provider with any questions.    If during the course of the call the physician/provider feels a video visit is not appropriate, you will not be charged for this service.\"    Patient has given verbal consent for Video visit? Yes    How would you like to obtain your AVS? Smartaxi     TEXT video visit link: [phone]    Will anyone else be joining your video visit? No    Video-Visit Details    Type of service:  Video Visit    Distant Location (provider location):  [facility] ENDOCRINOLOGY     Laly Post MA    Patients Glucose Data was Sent via Email    Due to the COVID 19 pandemic this visit was converted to a video visit in order to help prevent spread of infection in this patient and the general population.    Time of start: 9:00 am  Time of end: 9:18 am  Total duration of video visit: 18 minutes.    HPI  Jake Brothers is a 45 year old male with type 1 diabetes mellitus. Video visit today for diabetes follow " pastor Joseph was diagnosed with type 1 diabetes mellitus in March 2018.  Pt's paternal uncle has hx of type 1 diabetes.  Jake's history is significant for celiac, vitiligo, obesity and hx of hepatic steatosis.  Pt has no known history of retinopathy, nephropathy or neuropathy.  For his diabetes, he is currently using a Tandem insulin pump-basal IQ and DexcomG6 sensor.  His basal insulin rate is 1.4 units/hr x 24 hrs. I/C ratio is 1:8.  Pt's A1C was 8.1 % in Jan 2020.  His previous A1C was 7.6 % in 7/2019.  I do not have his insulin pump download data today.  I did review his DexcomG6 sensor download which showed an average glucose 136 with SD 52 and estimated A1C of 6.6 %.  His blood sugars are in target 75 % of the time, above target 20 % of the time and below target 1 % of the time.  Jake has been having overnight hypoglycemia.  He has been more active and states he has lost weight being home full time with the kids ( ages 5 and 10 months ).  On ROS today, no abdominal pain or diarrhea.  He denies blurred vision, n/v, SOB at rest, cough, fever or chills.  No chest pain, dysuria, hematuria or foot ulcers.  No numbness or tingling in his feet or hands.  He continues to follow a gluten-free diet given his hx of celiac disease.    Diabetes Care  Retinopathy:none; pt seen by Oph in 8/2019 and has Oph appointment scheduled for Aug 2020.  Nephropathy:none; urine microalbuminuria was + at time when his A1C was > 15 %. Recheck urine microalbuminuria was negative in Oct 2018.  Neuropathy:none.  Foot Exam: no exam today.  Taking aspirin: no.  Lipids: LDL 73 in Oct 2018. Pt is taking Lipitor daily.  CAD: no.  Mental Health: no depression.  Insulin: Tandem insulin pump-basal IQ.  Testing: DexcomG6 sensor.    ROS  Please see under history of present illness.    Allergies  No Known Allergies    Medications  Current Outpatient Medications   Medication Sig Dispense Refill     atorvastatin (LIPITOR) 20 MG tablet Take 1 tablet (20 mg)  "by mouth daily 90 tablet 3     Continuous Blood Gluc Sensor (DEXCOM G6 SENSOR) MISC 1 each every 10 days 9 each 3     Continuous Blood Gluc Transmit (DEXCOM G6 TRANSMITTER) MISC 1 each every 3 months 1 each 3     insulin aspart (NOVOLOG VIAL) 100 UNITS/ML vial Use 70 units per day via insulin pump (Max dose 70 units) 4 month supply 90 mL 3     insulin cartridge (T:SLIM 3ML) misc pump supply Insulin cartridge to be used with pump as directed.  Change every 3 days or as directed. 30 each 3     Insulin Infusion Pump Supplies (AUTOSOFT 90 INFUSION SET) MISC 1 each every 3 days Size and tubing length per pt preference 30 each 3     insulin pen needle (BD ROVERTO U/F) 32G X 4 MM miscellaneous USE SIX PEN NEEDLES DAILY OR AS DIRECTED 600 each 3     insulin syringe-needle U-100 (29G X 1/2\" 0.5 ML) 29G X 1/2\" 0.5 ML miscellaneous Use 4 syringes daily or as directed. 100 each 1     Multiple Vitamins-Minerals (MULTIVITAMIN ADULT PO)        Ostomy Supplies (SKIN TAC ADHESIVE BARRIER WIPE) MISC 1 each every 3 days 50 each 11     albuterol (PROAIR HFA, PROVENTIL HFA, VENTOLIN HFA) 108 (90 BASE) MCG/ACT inhaler Inhale 2 puffs into the lungs every 6 hours as needed for shortness of breath / dyspnea or wheezing (Patient not taking: Reported on 7/23/2020) 1 Inhaler 0     glucagon (GLUCAGON EMERGENCY) 1 MG kit Inject 1 mg into the muscle once for 1 dose 1 mg 1       Family History  family history includes Alcohol/Drug in his father; Allergies in his sister; Depression in his father and mother; Diabetes in his paternal uncle and paternal uncle; Heart Disease in his father; Hypertension in his father; Macular Degeneration in his maternal grandfather.    Social History   reports that he has quit smoking. He has never used smokeless tobacco. He reports that he does not drink alcohol or use drugs.     Past Medical History  Past Medical History:   Diagnosis Date     Bronchitis, chronic, simple (H)      Obesity        Past Surgical History: "   Procedure Laterality Date     C ORAL SURGERY PROCEDURE  1994    [place] Teeth Extraction       Physical Exam    No exam today.      RESULTS  Creatinine   Date Value Ref Range Status   10/05/2018 0.56 (L) 0.66 - 1.25 mg/dL Final     GFR Estimate   Date Value Ref Range Status   10/05/2018 >90 >60 mL/min/1.7m2 Final     Comment:     Non  GFR Calc     Hemoglobin A1C   Date Value Ref Range Status   05/14/2018 8.1 (H) 0 - 5.6 % Final     Comment:     Normal <5.7% Prediabetes 5.7-6.4%  Diabetes 6.5% or higher - adopted from ADA   consensus guidelines.       Potassium   Date Value Ref Range Status   10/05/2018 4.1 3.4 - 5.3 mmol/L Final     ALT   Date Value Ref Range Status   10/05/2018 41 0 - 70 U/L Final     AST   Date Value Ref Range Status   10/05/2018 20 0 - 45 U/L Final     TSH   Date Value Ref Range Status   10/05/2018 0.48 0.40 - 4.00 mU/L Final       Cholesterol   Date Value Ref Range Status   10/05/2018 124 <200 mg/dL Final   03/05/2018 202 (H) <200 mg/dL Final     Comment:     Desirable:       <200 mg/dl     HDL Cholesterol   Date Value Ref Range Status   10/05/2018 39 (L) >39 mg/dL Final   03/05/2018 25 (L) >39 mg/dL Final     LDL Cholesterol Calculated   Date Value Ref Range Status   10/05/2018 73 <100 mg/dL Final     Comment:     Desirable:       <100 mg/dl   03/05/2018 108 (H) <100 mg/dL Final     Comment:     Above desirable:  100-129 mg/dl  Borderline High:  130-159 mg/dL  High:             160-189 mg/dL  Very high:       >189 mg/dl       Triglycerides   Date Value Ref Range Status   10/05/2018 59 <150 mg/dL Final     Comment:     Fasting specimen   03/05/2018 345 (H) <150 mg/dL Final     Comment:     Borderline high:  150-199 mg/dl  High:             200-499 mg/dl  Very high:       >499 mg/dl  Fasting specimen       Cholesterol/HDL Ratio   Date Value Ref Range Status   09/25/2014 4.5 0.0 - 5.0 Final   02/23/2009 6.8 (H) 0.0 - 5.0 Final       ASSESSMENT/PLAN:    1. TYPE 1 DIABETES MELLITUS:  Jake has been having overnight hypoglycemia.  I asked him to reduce his midnight basal insulin rate 1.3 units/hr.  New basal insulin rates:  Midnight = 1.3 units/hr.  6 am = 1.4 units/hr.  If he continues to have hypoglycemia, he is to notify me.  I will have Kristin Stockton CDE call pt to discuss use of control IQ upgrade with his insulin pump.  He is to wear his DexcomG6 sensor full time.  Discussed reducing either basal rate or bolus prior to walking/exercise to help prevent hypoglycemia.  Pt was seen by Oph in Aug 2019 without retinopathy. He will be seeing Oph in Aug 2020.  His urine microalbuminuria negative in Oct 2018 with normal creat/GFR.  Jake denies any foot ulcers at this time. No sx of neuropathy.  Reminded him he has annual fasting diabetes labs ordered including an A1C.    2.  CELIAC: Stable on gluten free diet.    3.  HYPERLIPIDEMIA: LDL 73 in Oct 2018 on Lipitor.  Fasting lipid panel ordered.    4. FOLLOW UP: with me in 3 months.    Again, thank you for allowing me to participate in the care of your patient.      Sincerely,    Bety Dacosta PA-C

## 2020-07-26 DIAGNOSIS — E10.65 TYPE 1 DIABETES MELLITUS WITH HYPERGLYCEMIA (H): ICD-10-CM

## 2020-07-27 NOTE — TELEPHONE ENCOUNTER
INSULIN ASPART 100/ML INJ,10ML    Last Written Prescription Date:  4/1/2020  Last Fill Quantity: 90,   # refills: 3  Last Office Visit : 7/24/2020  Future Office visit:  None    Routing refill request to provider for review/approval because:  Drug not on the G, P or [facility] refill protocol or controlled substance      Shea Price RN  Central Triage Red Flags/Med Refills

## 2020-08-10 DIAGNOSIS — R53.83 FATIGUE, UNSPECIFIED TYPE: ICD-10-CM

## 2020-08-10 DIAGNOSIS — L80 VITILIGO: ICD-10-CM

## 2020-08-10 DIAGNOSIS — K90.0 CELIAC DISEASE: ICD-10-CM

## 2020-08-10 DIAGNOSIS — E10.9 TYPE 1 DIABETES MELLITUS WITHOUT COMPLICATION (H): ICD-10-CM

## 2020-08-10 LAB
ALT SERPL W P-5'-P-CCNC: 36 U/L (ref 0–70)
AST SERPL W P-5'-P-CCNC: 24 U/L (ref 0–45)
CHOLEST SERPL-MCNC: 124 MG/DL
CREAT SERPL-MCNC: 0.76 MG/DL (ref 0.66–1.25)
CREAT UR-MCNC: 292 MG/DL
GFR SERPL CREATININE-BSD FRML MDRD: >90 ML/MIN/{1.73_M2}
HDLC SERPL-MCNC: 40 MG/DL
HGB BLD-MCNC: 16.1 G/DL (ref 13.3–17.7)
LDLC SERPL CALC-MCNC: 76 MG/DL
MICROALBUMIN UR-MCNC: 22 MG/L
MICROALBUMIN/CREAT UR: 7.6 MG/G CR (ref 0–17)
NONHDLC SERPL-MCNC: 84 MG/DL
T4 FREE SERPL-MCNC: 0.99 NG/DL (ref 0.76–1.46)
TRIGL SERPL-MCNC: 41 MG/DL
TSH SERPL DL<=0.005 MIU/L-ACNC: 0.8 MU/L (ref 0.4–4)

## 2020-08-10 PROCEDURE — 82565 ASSAY OF CREATININE: CPT | Performed by: PHYSICIAN ASSISTANT

## 2020-08-10 PROCEDURE — 84450 TRANSFERASE (AST) (SGOT): CPT | Performed by: PHYSICIAN ASSISTANT

## 2020-08-10 PROCEDURE — 84443 ASSAY THYROID STIM HORMONE: CPT | Performed by: PHYSICIAN ASSISTANT

## 2020-08-10 PROCEDURE — 80061 LIPID PANEL: CPT | Performed by: PHYSICIAN ASSISTANT

## 2020-08-10 PROCEDURE — 82043 UR ALBUMIN QUANTITATIVE: CPT | Performed by: PHYSICIAN ASSISTANT

## 2020-08-10 PROCEDURE — 84460 ALANINE AMINO (ALT) (SGPT): CPT | Performed by: PHYSICIAN ASSISTANT

## 2020-08-10 PROCEDURE — 36415 COLL VENOUS BLD VENIPUNCTURE: CPT | Performed by: PHYSICIAN ASSISTANT

## 2020-08-10 PROCEDURE — 85018 HEMOGLOBIN: CPT | Performed by: PHYSICIAN ASSISTANT

## 2020-08-10 PROCEDURE — 84439 ASSAY OF FREE THYROXINE: CPT | Performed by: PHYSICIAN ASSISTANT

## 2020-08-12 DIAGNOSIS — E10.65 TYPE 1 DIABETES MELLITUS WITH HYPERGLYCEMIA (H): Primary | ICD-10-CM

## 2020-08-12 NOTE — PROGRESS NOTES
8/12/2020  Left message for pt that the lab roxann a hemoglobin level instead of an A1C .  I placed an order for another A1C.  Creat/GFR, ALT/AST, TSH all normal.  Pt's lipid panel results were good.  Bety Dacosta PA-C

## 2020-08-19 ENCOUNTER — MYC MEDICAL ADVICE (OUTPATIENT)
Dept: ENDOCRINOLOGY | Facility: CLINIC | Age: 45
End: 2020-08-19

## 2020-11-04 DIAGNOSIS — E10.9 TYPE 1 DIABETES MELLITUS WITHOUT COMPLICATION (H): ICD-10-CM

## 2020-11-06 ENCOUNTER — MYC MEDICAL ADVICE (OUTPATIENT)
Dept: ENDOCRINOLOGY | Facility: CLINIC | Age: 45
End: 2020-11-06

## 2020-12-06 ENCOUNTER — HEALTH MAINTENANCE LETTER (OUTPATIENT)
Age: 45
End: 2020-12-06

## 2021-01-07 NOTE — PATIENT INSTRUCTIONS
We appreciate your assistance in coordinating your healthcare.     Please upload your insulin pump, blood sugar meter and/or continuous glucose monitor at home 1-2 days before your next diabetes-related appointment.   This will allow your provider to review your  data before your scheduled virtual visit.    To ask a question to your Endocrine care team, please send them a Yogiyo message, or reach them by phone at [phone]     To expedite your medication refill(s), please contact your pharmacy and have them   fax a refill request to: [phone].  *Please allow 3 business days for routine medication refills.  *Please allow 5 business days for controlled substance medication refills.    For after-hours urgent Endocrine issues, that do not require 961, please dial [phone], and ask to speak with the Endocrinologist On-Call

## 2021-01-07 NOTE — PROGRESS NOTES
Jake Brothers is a 45 year old male who is being evaluated via a billable video visit.      How would you like to obtain your AVS? [email or web address]     Text video visit link to: [phone] (doximity)    Will anyone else be joining your video visit? No    Laly Post MA    Outcome for 01/07/21 2:10 PM :Glucose sent via Email     breast and formula feeding

## 2021-01-08 ENCOUNTER — VIRTUAL VISIT (OUTPATIENT)
Dept: ENDOCRINOLOGY | Facility: CLINIC | Age: 46
End: 2021-01-08
Payer: COMMERCIAL

## 2021-01-08 DIAGNOSIS — E10.9 TYPE 1 DIABETES MELLITUS WITHOUT COMPLICATION (H): ICD-10-CM

## 2021-01-08 DIAGNOSIS — E10.65 TYPE 1 DIABETES MELLITUS WITH HYPERGLYCEMIA (H): Primary | ICD-10-CM

## 2021-01-08 PROCEDURE — 99215 OFFICE O/P EST HI 40 MIN: CPT | Mod: 95 | Performed by: PHYSICIAN ASSISTANT

## 2021-01-08 NOTE — LETTER
1/8/2021       RE: Jake Brothers  [address]  [facility] [address]     Dear Colleague,    Thank you for referring your patient, Jake Brothers, to the [facility] ENDOCRINOLOG[facility] [place] at [facility]. Please see a copy of my visit note below.    Jake Brothers is a 45 year old male who is being evaluated via a billable video visit.      How would you like to obtain your AVS? [place] video visit link to: [phone] (doximity)    Will anyone else be joining your video visit? No    Laly Post MA    Outcome for 01/07/21 2:10 PM :Glucose sent via Email      Due to the COVID 19 pandemic this visit was converted to a video visit in order to help prevent spread of infection in this patient and the general population.    Time of start: 9:00 am  Time of end: 9:26 am  Total duration of video visit: 26 minutes.    HPI  Jake Brothers is a 45 year old male with type 1 diabetes mellitus. Video visit today for diabetes follow up  Jake was diagnosed with type 1 diabetes mellitus in March 2018.  Pt's paternal uncle has hx of type 1 diabetes.  Jake's history is significant for celiac, vitiligo, obesity and hx of hepatic steatosis.  Pt has no known history of retinopathy, nephropathy or neuropathy.  For his diabetes, he is currently using a Tandem insulin pump-basal IQ and DexcomG6 sensor.  His basal insulin rate is 1.6 units/hr x 24 hrs. I/C ratio is 1:8.  Pt's A1C was 8.1 % in Jan 2020.  His previous A1C was 7.6 % in 7/2019.  I do not have his insulin pump download data today.  I did review his DexcomG6 sensor download which showed an average glucose 117 with SD 40.  His glucose is in target 84 % of the time, above target 9 % of the time and below target 7 % of the time.  His Dexcom download shows he is having low blood sugar from 9 pm -midnight some evenings. This may be related to walking.  I asked him if he plans to walk to reduce his bolus dose with  his meal prior to walking to see if this helps prevent hypoglycemia.  He states he is able to recognize a low blood sugar in the 60 range. He does wear his DexcomG6 sensor full time.  On ROS today, he remains at home since COVID19 pandemic. He cares for his 2 young children and his wife is working from home full time.  He has been walking outdoors.  Some weight gain.  He continues to follow a gluten-free diet given his hx of celiac disease.  Pt denies abd pain or diarrhea.  He denies frequent headaches, blurred vision, n/v, SOB at rest, cough, fever or chills.  No chest pain, melena, blood in the stool, hematuria or dysuria.  No sx of peripheral neuropathy and he denies foot ulcers at this time.    Diabetes Care  Retinopathy:none; pt seen by Oph in 8/2019. He has no visual complaints and prefers to wait on seeing his Oph until he has received the COVID19 vaccine.  Nephropathy:none; urine microalbuminuria negative in 8/2020.  Neuropathy:none.  Foot Exam: no exam today.  Taking aspirin: no.  Lipids: LDL 76 in 8/2020. Pt is taking Lipitor daily.  CAD: no.  Mental Health: no depression.  Insulin: Tandem insulin pump-basal IQ.  Testing: DexcomG6 sensor.    ROS  Please see under history of present illness.    Allergies  No Known Allergies    Medications  Current Outpatient Medications   Medication Sig Dispense Refill     atorvastatin (LIPITOR) 20 MG tablet Take 1 tablet (20 mg) by mouth daily 90 tablet 3     Continuous Blood Gluc Sensor (DEXCOM G6 SENSOR) MISC 1 each every 10 days 9 each 3     Continuous Blood Gluc Transmit (DEXCOM G6 TRANSMITTER) MISC 1 each every 3 months 1 each 3     insulin cartridge (T:SLIM 3ML) misc pump supply Insulin cartridge to be used with pump as directed.  Change every 3 days or as directed. 30 each 3     Insulin Infusion Pump Supplies (AUTOSOFT 90 INFUSION SET) MISC 1 each every 3 days Size and tubing length per pt preference 30 each 3     insulin lispro (HUMALOG) 100 UNIT/ML vial USE 70- 80  " UNITS PER DAY WITH INSULIN PUMP. 30 mL 11     insulin pen needle (BD ROVERTO U/F) 32G X 4 MM miscellaneous USE SIX PEN NEEDLES DAILY OR AS DIRECTED 600 each 3     insulin syringe-needle U-100 (29G X 1/2\" 0.5 ML) 29G X 1/2\" 0.5 ML miscellaneous Use 4 syringes daily or as directed. 100 each 1     Multiple Vitamins-Minerals (MULTIVITAMIN ADULT PO)        Ostomy Supplies (SKIN TAC ADHESIVE BARRIER WIPE) MISC 1 each every 3 days 50 each 11     albuterol (PROAIR HFA, PROVENTIL HFA, VENTOLIN HFA) 108 (90 BASE) MCG/ACT inhaler Inhale 2 puffs into the lungs every 6 hours as needed for shortness of breath / dyspnea or wheezing (Patient not taking: Reported on 7/23/2020) 1 Inhaler 0     glucagon (GLUCAGON EMERGENCY) 1 MG kit Inject 1 mg into the muscle once for 1 dose 1 mg 1       Family History  family history includes Alcohol/Drug in his father; Allergies in his sister; Depression in his father and mother; Diabetes in his paternal uncle and paternal uncle; Heart Disease in his father; Hypertension in his father; Macular Degeneration in his maternal grandfather.    Social History   reports that he has quit smoking. He has never used smokeless tobacco. He reports that he does not drink alcohol or use drugs.     Past Medical History  Past Medical History:   Diagnosis Date     Bronchitis, chronic, simple (H)      Obesity        Past Surgical History:   Procedure Laterality Date     C ORAL SURGERY PROCEDURE  1994    [place] Teeth Extraction       Physical Exam    No exam today.      RESULTS  Creatinine   Date Value Ref Range Status   08/10/2020 0.76 0.66 - 1.25 mg/dL Final     GFR Estimate   Date Value Ref Range Status   08/10/2020 >90 >60 mL/min/[1.73_m2] Final     Comment:     Non  GFR Calc  Starting 12/18/2018, serum creatinine based estimated GFR (eGFR) will be   calculated using the Chronic Kidney Disease Epidemiology Collaboration   (CKD-EPI) equation.       Hemoglobin A1C   Date Value Ref Range Status "   05/14/2018 8.1 (H) 0 - 5.6 % Final     Comment:     Normal <5.7% Prediabetes 5.7-6.4%  Diabetes 6.5% or higher - adopted from ADA   consensus guidelines.       Potassium   Date Value Ref Range Status   10/05/2018 4.1 3.4 - 5.3 mmol/L Final     ALT   Date Value Ref Range Status   08/10/2020 36 0 - 70 U/L Final     AST   Date Value Ref Range Status   08/10/2020 24 0 - 45 U/L Final     TSH   Date Value Ref Range Status   08/10/2020 0.80 0.40 - 4.00 mU/L Final     T4 Free   Date Value Ref Range Status   08/10/2020 0.99 0.76 - 1.46 ng/dL Final       Cholesterol   Date Value Ref Range Status   08/10/2020 124 <200 mg/dL Final   10/05/2018 124 <200 mg/dL Final     HDL Cholesterol   Date Value Ref Range Status   08/10/2020 40 >39 mg/dL Final   10/05/2018 39 (L) >39 mg/dL Final     LDL Cholesterol Calculated   Date Value Ref Range Status   08/10/2020 76 <100 mg/dL Final     Comment:     Desirable:       <100 mg/dl   10/05/2018 73 <100 mg/dL Final     Comment:     Desirable:       <100 mg/dl     Triglycerides   Date Value Ref Range Status   08/10/2020 41 <150 mg/dL Final     Comment:     Fasting specimen   10/05/2018 59 <150 mg/dL Final     Comment:     Fasting specimen     Cholesterol/HDL Ratio   Date Value Ref Range Status   09/25/2014 4.5 0.0 - 5.0 Final   02/23/2009 6.8 (H) 0.0 - 5.0 Final     A1C  8.1 % in Jan 2020.        ASSESSMENT/PLAN:    1. TYPE 1 DIABETES MELLITUS: Jake has been having hypoglycemia in the evening from 9 pm - midnight. He often walks in the evening. I asked him to take 1/2 his bolus insulin dose with the meal prior to his walk to see if this helps reduce his hypoglycemia.  He is interested in using the software upgrade for control IQ on his insulin pump. I plan to have our diabetes educator call him regarding this upgrade.  He is to wear his DexcomG6 sensor full time.  Pt was seen by Oph in Aug 2019 without retinopathy. Jake prefers to wait and see his Oph once he has had the COVID19 vaccine. He  has no visual complaints at this time.  His urine microalbuminuria negative in Aug 2020 with normal creat/GFR.  Pt denies sx of peripheral neuropathy at this time and no foot ulcers.  Jake had the flu vaccine in Fall 2020.    2.  CELIAC: Stable on gluten free diet.    3.  HYPERLIPIDEMIA: LDL 76 in 8/2020 on Lipitor.    4. FOLLOW UP: with me in 4 months.  A1C ordered today.  Pt's Humalog quantity increased 90 units/day and refilled today.  Glucagon kit refill today.    Today ( 1/8/2021) I spent 11 minutes reviewing patient's chart notes from the past year, lab results from the past 2 years and also reviewed his DexcomG6 sensor download data.  Video visit today was 26 minutes.  Documentation time was 10 minutes.  I placed an order for an A1C today.    TOTAL TIME FOR VISIT TODAY WAS:  47 minutes.     Again, thank you for allowing me to participate in the care of your patient.      Sincerely,    Bety Dacosta PA-C

## 2021-01-08 NOTE — PROGRESS NOTES
Due to the COVID 19 pandemic this visit was converted to a video visit in order to help prevent spread of infection in this patient and the general population.    Time of start: 9:00 am  Time of end: 9:26 am  Total duration of video visit: 26 minutes.    HPI  Jake Brothers is a 45 year old male with type 1 diabetes mellitus. Video visit today for diabetes follow up  Jake was diagnosed with type 1 diabetes mellitus in March 2018.  Pt's paternal uncle has hx of type 1 diabetes.  Jake's history is significant for celiac, vitiligo, obesity and hx of hepatic steatosis.  Pt has no known history of retinopathy, nephropathy or neuropathy.  For his diabetes, he is currently using a Tandem insulin pump-basal IQ and DexcomG6 sensor.  His basal insulin rate is 1.6 units/hr x 24 hrs. I/C ratio is 1:8.  Pt's A1C was 8.1 % in Jan 2020.  His previous A1C was 7.6 % in 7/2019.  I do not have his insulin pump download data today.  I did review his DexcomG6 sensor download which showed an average glucose 117 with SD 40.  His glucose is in target 84 % of the time, above target 9 % of the time and below target 7 % of the time.  His Dexcom download shows he is having low blood sugar from 9 pm -midnight some evenings. This may be related to walking.  I asked him if he plans to walk to reduce his bolus dose with his meal prior to walking to see if this helps prevent hypoglycemia.  He states he is able to recognize a low blood sugar in the 60 range. He does wear his DexcomG6 sensor full time.  On ROS today, he remains at home since COVID19 pandemic. He cares for his 2 young children and his wife is working from home full time.  He has been walking outdoors.  Some weight gain.  He continues to follow a gluten-free diet given his hx of celiac disease.  Pt denies abd pain or diarrhea.  He denies frequent headaches, blurred vision, n/v, SOB at rest, cough, fever or chills.  No chest pain, melena, blood in the stool, hematuria or dysuria.  No sx  "of peripheral neuropathy and he denies foot ulcers at this time.    Diabetes Care  Retinopathy:none; pt seen by Oph in 8/2019. He has no visual complaints and prefers to wait on seeing his Oph until he has received the COVID19 vaccine.  Nephropathy:none; urine microalbuminuria negative in 8/2020.  Neuropathy:none.  Foot Exam: no exam today.  Taking aspirin: no.  Lipids: LDL 76 in 8/2020. Pt is taking Lipitor daily.  CAD: no.  Mental Health: no depression.  Insulin: Tandem insulin pump-basal IQ.  Testing: DexcomG6 sensor.    ROS  Please see under history of present illness.    Allergies  No Known Allergies    Medications  Current Outpatient Medications   Medication Sig Dispense Refill     atorvastatin (LIPITOR) 20 MG tablet Take 1 tablet (20 mg) by mouth daily 90 tablet 3     Continuous Blood Gluc Sensor (DEXCOM G6 SENSOR) MISC 1 each every 10 days 9 each 3     Continuous Blood Gluc Transmit (DEXCOM G6 TRANSMITTER) MISC 1 each every 3 months 1 each 3     insulin cartridge (T:SLIM 3ML) misc pump supply Insulin cartridge to be used with pump as directed.  Change every 3 days or as directed. 30 each 3     Insulin Infusion Pump Supplies (AUTOSOFT 90 INFUSION SET) MISC 1 each every 3 days Size and tubing length per pt preference 30 each 3     insulin lispro (HUMALOG) 100 UNIT/ML vial USE 70- 80  UNITS PER DAY WITH INSULIN PUMP. 30 mL 11     insulin pen needle (BD ROVERTO U/F) 32G X 4 MM miscellaneous USE SIX PEN NEEDLES DAILY OR AS DIRECTED 600 each 3     insulin syringe-needle U-100 (29G X 1/2\" 0.5 ML) 29G X 1/2\" 0.5 ML miscellaneous Use 4 syringes daily or as directed. 100 each 1     Multiple Vitamins-Minerals (MULTIVITAMIN ADULT PO)        Ostomy Supplies (SKIN TAC ADHESIVE BARRIER WIPE) MISC 1 each every 3 days 50 each 11     albuterol (PROAIR HFA, PROVENTIL HFA, VENTOLIN HFA) 108 (90 BASE) MCG/ACT inhaler Inhale 2 puffs into the lungs every 6 hours as needed for shortness of breath / dyspnea or wheezing (Patient not " taking: Reported on 7/23/2020) 1 Inhaler 0     glucagon (GLUCAGON EMERGENCY) 1 MG kit Inject 1 mg into the muscle once for 1 dose 1 mg 1       Family History  family history includes Alcohol/Drug in his father; Allergies in his sister; Depression in his father and mother; Diabetes in his paternal uncle and paternal uncle; Heart Disease in his father; Hypertension in his father; Macular Degeneration in his maternal grandfather.    Social History   reports that he has quit smoking. He has never used smokeless tobacco. He reports that he does not drink alcohol or use drugs.     Past Medical History  Past Medical History:   Diagnosis Date     Bronchitis, chronic, simple (H)      Obesity        Past Surgical History:   Procedure Laterality Date     C ORAL SURGERY PROCEDURE  1994    [place] Teeth Extraction       Physical Exam    No exam today.      RESULTS  Creatinine   Date Value Ref Range Status   08/10/2020 0.76 0.66 - 1.25 mg/dL Final     GFR Estimate   Date Value Ref Range Status   08/10/2020 >90 >60 mL/min/[1.73_m2] Final     Comment:     Non  GFR Calc  Starting 12/18/2018, serum creatinine based estimated GFR (eGFR) will be   calculated using the Chronic Kidney Disease Epidemiology Collaboration   (CKD-EPI) equation.       Hemoglobin A1C   Date Value Ref Range Status   05/14/2018 8.1 (H) 0 - 5.6 % Final     Comment:     Normal <5.7% Prediabetes 5.7-6.4%  Diabetes 6.5% or higher - adopted from ADA   consensus guidelines.       Potassium   Date Value Ref Range Status   10/05/2018 4.1 3.4 - 5.3 mmol/L Final     ALT   Date Value Ref Range Status   08/10/2020 36 0 - 70 U/L Final     AST   Date Value Ref Range Status   08/10/2020 24 0 - 45 U/L Final     TSH   Date Value Ref Range Status   08/10/2020 0.80 0.40 - 4.00 mU/L Final     T4 Free   Date Value Ref Range Status   08/10/2020 0.99 0.76 - 1.46 ng/dL Final       Cholesterol   Date Value Ref Range Status   08/10/2020 124 <200 mg/dL Final   10/05/2018  124 <200 mg/dL Final     HDL Cholesterol   Date Value Ref Range Status   08/10/2020 40 >39 mg/dL Final   10/05/2018 39 (L) >39 mg/dL Final     LDL Cholesterol Calculated   Date Value Ref Range Status   08/10/2020 76 <100 mg/dL Final     Comment:     Desirable:       <100 mg/dl   10/05/2018 73 <100 mg/dL Final     Comment:     Desirable:       <100 mg/dl     Triglycerides   Date Value Ref Range Status   08/10/2020 41 <150 mg/dL Final     Comment:     Fasting specimen   10/05/2018 59 <150 mg/dL Final     Comment:     Fasting specimen     Cholesterol/HDL Ratio   Date Value Ref Range Status   09/25/2014 4.5 0.0 - 5.0 Final   02/23/2009 6.8 (H) 0.0 - 5.0 Final     A1C  8.1 % in Jan 2020.        ASSESSMENT/PLAN:    1. TYPE 1 DIABETES MELLITUS: Jake has been having hypoglycemia in the evening from 9 pm - midnight. He often walks in the evening. I asked him to take 1/2 his bolus insulin dose with the meal prior to his walk to see if this helps reduce his hypoglycemia.  He is interested in using the software upgrade for control IQ on his insulin pump. I plan to have our diabetes educator call him regarding this upgrade.  He is to wear his DexcomG6 sensor full time.  Pt was seen by Oph in Aug 2019 without retinopathy. Jake prefers to wait and see his Oph once he has had the COVID19 vaccine. He has no visual complaints at this time.  His urine microalbuminuria negative in Aug 2020 with normal creat/GFR.  Pt denies sx of peripheral neuropathy at this time and no foot ulcers.  Jake had the flu vaccine in Fall 2020.    2.  CELIAC: Stable on gluten free diet.    3.  HYPERLIPIDEMIA: LDL 76 in 8/2020 on Lipitor.    4. FOLLOW UP: with me in 4 months.  A1C ordered today.  Pt's Humalog quantity increased 90 units/day and refilled today.  Glucagon kit refill today.    Today ( 1/8/2021) I spent 11 minutes reviewing patient's chart notes from the past year, lab results from the past 2 years and also reviewed his DexcomG6 sensor download  data.  Video visit today was 26 minutes.  Documentation time was 10 minutes.  I placed an order for an A1C today.    TOTAL TIME FOR VISIT TODAY WAS:  47 minutes.

## 2021-01-13 ENCOUNTER — MEDICAL CORRESPONDENCE (OUTPATIENT)
Dept: HEALTH INFORMATION MANAGEMENT | Facility: CLINIC | Age: 46
End: 2021-01-13

## 2021-04-01 DIAGNOSIS — E10.9 TYPE 1 DIABETES MELLITUS WITHOUT COMPLICATION (H): Primary | ICD-10-CM

## 2021-04-01 DIAGNOSIS — E10.65 TYPE 1 DIABETES MELLITUS WITH HYPERGLYCEMIA (H): ICD-10-CM

## 2021-04-05 RX ORDER — ATORVASTATIN CALCIUM 20 MG/1
20 TABLET, FILM COATED ORAL DAILY
Qty: 90 TABLET | Refills: 2 | Status: SHIPPED | OUTPATIENT
Start: 2021-04-05

## 2021-06-02 DIAGNOSIS — E10.9 TYPE 1 DIABETES MELLITUS WITHOUT COMPLICATION (H): ICD-10-CM

## 2021-07-28 DIAGNOSIS — E10.9 TYPE 1 DIABETES MELLITUS WITHOUT COMPLICATION (H): ICD-10-CM

## 2021-07-30 RX ORDER — PROCHLORPERAZINE 25 MG/1
SUPPOSITORY RECTAL
Qty: 9 EACH | Refills: 3 | Status: SHIPPED | OUTPATIENT
Start: 2021-07-30

## 2021-07-30 RX ORDER — INFUSION SET FOR INSULIN PUMP
INFUSION SETS-PARAPHERNALIA MISCELLANEOUS
Qty: 30 EACH | Refills: 3 | Status: SHIPPED | OUTPATIENT
Start: 2021-07-30

## 2021-07-30 RX ORDER — PROCHLORPERAZINE 25 MG/1
SUPPOSITORY RECTAL
Qty: 1 EACH | Refills: 3 | Status: SHIPPED | OUTPATIENT
Start: 2021-07-30

## 2021-07-30 RX ORDER — INSULIN PUMP CARTRIDGE
CARTRIDGE (EA) SUBCUTANEOUS
Qty: 30 EACH | Refills: 3 | Status: SHIPPED | OUTPATIENT
Start: 2021-07-30

## 2021-07-30 NOTE — TELEPHONE ENCOUNTER
Diabetic Supplies Protocol [id number]/30/2021 10:41 AM   Recent (6 mo) or future (30 days) visit within the authorizing provider's specialty     PLEASE HELP SCHEDULE THE FOLLOWING APPT(S): Return Appointment    TIME FRAME NEEDED BY: First available.       SCHEDULING COMMENTS (optional): with Bety Dacosta

## 2021-07-30 NOTE — TELEPHONE ENCOUNTER
"AUTOSOFT 90 INF SET 9MM 23\" [place]  Last Written Prescription Date:  6-29-20  Last Fill Quantity: 30,   # refills: 3  T:SLIM X2 3ML CARTRIDGE  MISC   Last Written Prescription Date:  6-29-20  Last Fill Quantity: 30,   # refills: 3  DEXCOM G6 SENSOR  MISC   Last Written Prescription Date:  6-29-20  Last Fill Quantity: 9,   # refills: 3  DEXCOM G6 TRANSMITTER  MISC   Last Written Prescription Date:  6-29-20  Last Fill Quantity: 1,   # refills: 3    Last Office Visit : 1-8-21  Future Office visit:  none    Routing refill request to provider for review/approval because:  Pump/diabetic supply - refilled per clinic      "

## 2021-08-01 ENCOUNTER — HEALTH MAINTENANCE LETTER (OUTPATIENT)
Age: 46
End: 2021-08-01

## 2021-09-26 ENCOUNTER — HEALTH MAINTENANCE LETTER (OUTPATIENT)
Age: 46
End: 2021-09-26

## 2022-01-16 ENCOUNTER — HEALTH MAINTENANCE LETTER (OUTPATIENT)
Age: 47
End: 2022-01-16

## 2022-03-12 ENCOUNTER — HEALTH MAINTENANCE LETTER (OUTPATIENT)
Age: 47
End: 2022-03-12

## 2022-10-04 PROBLEM — E11.65 TYPE 2 DIABETES MELLITUS WITH HYPERGLYCEMIA (H): Status: RESOLVED | Noted: 2018-03-29 | Resolved: 2018-04-02

## 2023-04-23 ENCOUNTER — HEALTH MAINTENANCE LETTER (OUTPATIENT)
Age: 48
End: 2023-04-23

## 2023-12-02 ENCOUNTER — HEALTH MAINTENANCE LETTER (OUTPATIENT)
Age: 48
End: 2023-12-02